# Patient Record
Sex: MALE | Race: BLACK OR AFRICAN AMERICAN | NOT HISPANIC OR LATINO | Employment: FULL TIME | URBAN - METROPOLITAN AREA
[De-identification: names, ages, dates, MRNs, and addresses within clinical notes are randomized per-mention and may not be internally consistent; named-entity substitution may affect disease eponyms.]

---

## 2023-01-06 ENCOUNTER — TELEPHONE (OUTPATIENT)
Dept: NEUROSURGERY | Facility: CLINIC | Age: 48
End: 2023-01-06
Payer: COMMERCIAL

## 2023-01-06 ENCOUNTER — OFFICE VISIT (OUTPATIENT)
Dept: NEUROSURGERY | Facility: CLINIC | Age: 48
End: 2023-01-06
Payer: COMMERCIAL

## 2023-01-06 VITALS — HEART RATE: 81 BPM | SYSTOLIC BLOOD PRESSURE: 120 MMHG | DIASTOLIC BLOOD PRESSURE: 77 MMHG

## 2023-01-06 DIAGNOSIS — D49.6 BRAIN TUMOR: Primary | ICD-10-CM

## 2023-01-06 DIAGNOSIS — R56.9 SEIZURE: ICD-10-CM

## 2023-01-06 PROCEDURE — 99204 OFFICE O/P NEW MOD 45 MIN: CPT | Mod: S$GLB,,, | Performed by: NEUROLOGICAL SURGERY

## 2023-01-06 PROCEDURE — 99204 PR OFFICE/OUTPT VISIT, NEW, LEVL IV, 45-59 MIN: ICD-10-PCS | Mod: S$GLB,,, | Performed by: NEUROLOGICAL SURGERY

## 2023-01-06 PROCEDURE — 99999 PR PBB SHADOW E&M-EST. PATIENT-LVL III: CPT | Mod: PBBFAC,,, | Performed by: NEUROLOGICAL SURGERY

## 2023-01-06 PROCEDURE — 3074F PR MOST RECENT SYSTOLIC BLOOD PRESSURE < 130 MM HG: ICD-10-PCS | Mod: CPTII,S$GLB,, | Performed by: NEUROLOGICAL SURGERY

## 2023-01-06 PROCEDURE — 1159F PR MEDICATION LIST DOCUMENTED IN MEDICAL RECORD: ICD-10-PCS | Mod: CPTII,S$GLB,, | Performed by: NEUROLOGICAL SURGERY

## 2023-01-06 PROCEDURE — 3078F PR MOST RECENT DIASTOLIC BLOOD PRESSURE < 80 MM HG: ICD-10-PCS | Mod: CPTII,S$GLB,, | Performed by: NEUROLOGICAL SURGERY

## 2023-01-06 PROCEDURE — 3078F DIAST BP <80 MM HG: CPT | Mod: CPTII,S$GLB,, | Performed by: NEUROLOGICAL SURGERY

## 2023-01-06 PROCEDURE — 3074F SYST BP LT 130 MM HG: CPT | Mod: CPTII,S$GLB,, | Performed by: NEUROLOGICAL SURGERY

## 2023-01-06 PROCEDURE — 1159F MED LIST DOCD IN RCRD: CPT | Mod: CPTII,S$GLB,, | Performed by: NEUROLOGICAL SURGERY

## 2023-01-06 PROCEDURE — 99999 PR PBB SHADOW E&M-EST. PATIENT-LVL III: ICD-10-PCS | Mod: PBBFAC,,, | Performed by: NEUROLOGICAL SURGERY

## 2023-01-06 RX ORDER — ZOLPIDEM TARTRATE 10 MG/1
10 TABLET ORAL NIGHTLY PRN
COMMUNITY
Start: 2023-01-05

## 2023-01-06 RX ORDER — OMEPRAZOLE 40 MG/1
40 CAPSULE, DELAYED RELEASE ORAL EVERY MORNING
COMMUNITY
Start: 2022-12-29

## 2023-01-06 RX ORDER — CLONAZEPAM 0.5 MG/1
0.5 TABLET ORAL DAILY PRN
COMMUNITY
Start: 2023-01-05

## 2023-01-06 NOTE — H&P (VIEW-ONLY)
Neurosurgery  History & Physical    SUBJECTIVE:     Chief Complaint:  Newly discovered brain tumor    History of Present Illness:  This patient is a very pleasant 47-year-old gentleman who presented to another hospital after having a seizure.  According to the patient and his wife, the patient had a seizure in his sleep.  He then had another seizure while out at lunch.  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  During that visit, he was seen by a local neurosurgeon.  That surgeon agreed with the diagnosis and recommended a higher level of care.    He presents today for that care.    Since his visit to the hospital, he as not had further seizures.  He reports no speech difficulty or weakness at this time.     Review of patient's allergies indicates:  Not on File    No current outpatient medications on file.     No current facility-administered medications for this visit.       No past medical history on file.  No past surgical history on file.  Family History    None       Social History     Socioeconomic History    Marital status:        Review of Systems   Constitutional:  Negative for activity change and appetite change.   HENT: Negative.     Eyes: Negative.    Respiratory: Negative.     Neurological:  Positive for seizures. Negative for dizziness, tremors, syncope, facial asymmetry, speech difficulty, weakness, light-headedness, numbness and headaches.     OBJECTIVE:     Vital Signs     There is no height or weight on file to calculate BMI.      Physical Exam:    Constitutional: He appears well-developed and well-nourished.     Eyes: Pupils are equal, round, and reactive to light. EOM are normal.     Psych/Behavior: He is alert. He is oriented to person, place, and time. He has a normal mood and affect.     Musculoskeletal: Gait is normal.        Right Upper Extremities: Muscle strength is 5/5.        Left Upper  Extremities: Muscle strength is 5/5.       Right Lower Extremities: Muscle strength is 5/5.        Left Lower Extremities: Muscle strength is 5/5.     Neurological:        Cranial nerves: Cranial nerve(s) II, III, IV, V, VI, VII, VIII, IX, X, XI and XII are intact.       Diagnostic Results:  MRI Brain showed 3.8 x 4.0 cm area of increased FLAIR signal with slight enhancement concerning for primary glioma.    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  Will get DTI MRI with functional MRI.  Will plan for surgical resection with Omniscient and motor mapping.         Note dictated with voice recognition software, please excuse any grammatical errors.

## 2023-01-06 NOTE — PROGRESS NOTES
Neurosurgery  History & Physical    SUBJECTIVE:     Chief Complaint:  Newly discovered brain tumor    History of Present Illness:  This patient is a very pleasant 47-year-old gentleman who presented to another hospital after having a seizure.  According to the patient and his wife, the patient had a seizure in his sleep.  He then had another seizure while out at lunch.  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  During that visit, he was seen by a local neurosurgeon.  That surgeon agreed with the diagnosis and recommended a higher level of care.    He presents today for that care.    Since his visit to the hospital, he as not had further seizures.  He reports no speech difficulty or weakness at this time.     Review of patient's allergies indicates:  Not on File    No current outpatient medications on file.     No current facility-administered medications for this visit.       No past medical history on file.  No past surgical history on file.  Family History    None       Social History     Socioeconomic History    Marital status:        Review of Systems   Constitutional:  Negative for activity change and appetite change.   HENT: Negative.     Eyes: Negative.    Respiratory: Negative.     Neurological:  Positive for seizures. Negative for dizziness, tremors, syncope, facial asymmetry, speech difficulty, weakness, light-headedness, numbness and headaches.     OBJECTIVE:     Vital Signs     There is no height or weight on file to calculate BMI.      Physical Exam:    Constitutional: He appears well-developed and well-nourished.     Eyes: Pupils are equal, round, and reactive to light. EOM are normal.     Psych/Behavior: He is alert. He is oriented to person, place, and time. He has a normal mood and affect.     Musculoskeletal: Gait is normal.        Right Upper Extremities: Muscle strength is 5/5.        Left Upper  Extremities: Muscle strength is 5/5.       Right Lower Extremities: Muscle strength is 5/5.        Left Lower Extremities: Muscle strength is 5/5.     Neurological:        Cranial nerves: Cranial nerve(s) II, III, IV, V, VI, VII, VIII, IX, X, XI and XII are intact.       Diagnostic Results:  MRI Brain showed 3.8 x 4.0 cm area of increased FLAIR signal with slight enhancement concerning for primary glioma.    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  Will get DTI MRI with functional MRI.  Will plan for surgical resection with Omniscient and motor mapping.         Note dictated with voice recognition software, please excuse any grammatical errors.

## 2023-01-11 ENCOUNTER — HOSPITAL ENCOUNTER (OUTPATIENT)
Dept: RADIOLOGY | Facility: HOSPITAL | Age: 48
Discharge: HOME | DRG: 027 | End: 2023-01-11
Attending: NEUROLOGICAL SURGERY
Payer: COMMERCIAL

## 2023-01-11 ENCOUNTER — ANESTHESIA EVENT (OUTPATIENT)
Dept: SURGERY | Facility: HOSPITAL | Age: 48
DRG: 027 | End: 2023-01-11
Payer: COMMERCIAL

## 2023-01-11 ENCOUNTER — TELEPHONE (OUTPATIENT)
Dept: NEUROSURGERY | Facility: CLINIC | Age: 48
End: 2023-01-11
Payer: COMMERCIAL

## 2023-01-11 DIAGNOSIS — D49.6 BRAIN TUMOR: ICD-10-CM

## 2023-01-11 PROCEDURE — 70553 MRI BRAIN STEM W/O & W/DYE: CPT | Mod: TC

## 2023-01-11 PROCEDURE — A9585 GADOBUTROL INJECTION: HCPCS | Performed by: NEUROLOGICAL SURGERY

## 2023-01-11 PROCEDURE — 25500020 PHARM REV CODE 255: Performed by: NEUROLOGICAL SURGERY

## 2023-01-11 PROCEDURE — 70553 MRI BRAIN STEM W/O & W/DYE: CPT | Mod: 26,,, | Performed by: RADIOLOGY

## 2023-01-11 PROCEDURE — 70555 MRI BRAIN FUNCTIONAL WITH A PHYSICIAN: ICD-10-PCS | Mod: 26,59,, | Performed by: RADIOLOGY

## 2023-01-11 PROCEDURE — 70555 FMRI BRAIN BY PHYS/PSYCH: CPT | Mod: TC

## 2023-01-11 PROCEDURE — 70555 FMRI BRAIN BY PHYS/PSYCH: CPT | Mod: 26,59,, | Performed by: RADIOLOGY

## 2023-01-11 PROCEDURE — 70553 MRI BRAIN SYNAPTIVE STEALTH W/WO CONTRAST: ICD-10-PCS | Mod: 26,,, | Performed by: RADIOLOGY

## 2023-01-11 RX ORDER — GADOBUTROL 604.72 MG/ML
8 INJECTION INTRAVENOUS
Status: COMPLETED | OUTPATIENT
Start: 2023-01-11 | End: 2023-01-11

## 2023-01-11 RX ADMIN — GADOBUTROL 8 ML: 604.72 INJECTION INTRAVENOUS at 12:01

## 2023-01-11 NOTE — PRE-PROCEDURE INSTRUCTIONS
PREOP INSTRUCTIONS TO PATIENT'S WIFE:  No food,milk or milk products for 8 hours before surgery.  Clear liquids like water,gatorade,apple juice are allowed up until 2 hours before surgery.  Instructed to follow the surgeon's instructions if they differ from these.  Shower instructions as well as directions to the Surgery Center were given.  Encouraged to wear loose fitting,comfortable clothing.  Medication instructions for pm prior to and am of procedure reviewed.  Instructed to avoid taking vitamins,supplements,aspirin and ibuprofen the morning of surgery.    This will be the patient's first surgery/anesthesia    Patient's wife does not know arrival time.Explained that this information comes from the surgeon's office and if they haven't heard from them by 2 or 3 pm to call the office.Patient's wife stated an understanding.

## 2023-01-11 NOTE — TELEPHONE ENCOUNTER
Patient contacted. S/W wife. Advised to arrive for surgery at 0845, DOSC, NPO after MN, shower x 2 with Hibiclens or Dial antibacterial soap. Understanding verbalized by wife.

## 2023-01-12 ENCOUNTER — ANESTHESIA (OUTPATIENT)
Dept: SURGERY | Facility: HOSPITAL | Age: 48
DRG: 027 | End: 2023-01-12
Payer: COMMERCIAL

## 2023-01-12 ENCOUNTER — HOSPITAL ENCOUNTER (INPATIENT)
Facility: HOSPITAL | Age: 48
LOS: 1 days | Discharge: HOME | DRG: 027 | End: 2023-01-13
Attending: NEUROLOGICAL SURGERY | Admitting: NEUROLOGICAL SURGERY
Payer: COMMERCIAL

## 2023-01-12 DIAGNOSIS — Z91.89 AT RISK FOR LONG QT SYNDROME: ICD-10-CM

## 2023-01-12 DIAGNOSIS — D49.6 BRAIN TUMOR: ICD-10-CM

## 2023-01-12 PROBLEM — R56.9 SEIZURES: Chronic | Status: ACTIVE | Noted: 2023-01-12

## 2023-01-12 LAB
ABO + RH BLD: NORMAL
BLD GP AB SCN CELLS X3 SERPL QL: NORMAL
POCT GLUCOSE: 122 MG/DL (ref 70–110)
POCT GLUCOSE: 143 MG/DL (ref 70–110)

## 2023-01-12 PROCEDURE — 61510 CRNEC TREPH EXC BRN TUM STTL: CPT | Mod: ,,, | Performed by: NEUROLOGICAL SURGERY

## 2023-01-12 PROCEDURE — 37000009 HC ANESTHESIA EA ADD 15 MINS: Performed by: NEUROLOGICAL SURGERY

## 2023-01-12 PROCEDURE — 36000713 HC OR TIME LEV V EA ADD 15 MIN: Performed by: NEUROLOGICAL SURGERY

## 2023-01-12 PROCEDURE — 37000008 HC ANESTHESIA 1ST 15 MINUTES: Performed by: NEUROLOGICAL SURGERY

## 2023-01-12 PROCEDURE — 64405 PR NERVE BLOCK INJ, ANES/STEROID, OCCIPITAL: ICD-10-PCS | Mod: 59,50,, | Performed by: ANESTHESIOLOGY

## 2023-01-12 PROCEDURE — 64400 PERIPHERAL BLOCK: ICD-10-PCS | Mod: 59,50,, | Performed by: ANESTHESIOLOGY

## 2023-01-12 PROCEDURE — 25000003 PHARM REV CODE 250: Performed by: NURSE ANESTHETIST, CERTIFIED REGISTERED

## 2023-01-12 PROCEDURE — 36000712 HC OR TIME LEV V 1ST 15 MIN: Performed by: NEUROLOGICAL SURGERY

## 2023-01-12 PROCEDURE — 61510 PR EXCIS SUPRATENT BRAIN TUMOR: ICD-10-PCS | Mod: ,,, | Performed by: NEUROLOGICAL SURGERY

## 2023-01-12 PROCEDURE — 64450 PR NERVE BLOCK INJ, ANES/STEROID, OTHER PERIPHERAL: ICD-10-PCS | Mod: 59,50,, | Performed by: ANESTHESIOLOGY

## 2023-01-12 PROCEDURE — 93010 ELECTROCARDIOGRAM REPORT: CPT | Mod: ,,, | Performed by: INTERNAL MEDICINE

## 2023-01-12 PROCEDURE — D9220A PRA ANESTHESIA: Mod: CRNA,,, | Performed by: NURSE ANESTHETIST, CERTIFIED REGISTERED

## 2023-01-12 PROCEDURE — 63600175 PHARM REV CODE 636 W HCPCS: Performed by: NURSE ANESTHETIST, CERTIFIED REGISTERED

## 2023-01-12 PROCEDURE — D9220A PRA ANESTHESIA: Mod: ANES,,, | Performed by: ANESTHESIOLOGY

## 2023-01-12 PROCEDURE — 64450 NJX AA&/STRD OTHER PN/BRANCH: CPT | Mod: 59,50,, | Performed by: ANESTHESIOLOGY

## 2023-01-12 PROCEDURE — 99223 1ST HOSP IP/OBS HIGH 75: CPT | Mod: ,,,

## 2023-01-12 PROCEDURE — 86920 COMPATIBILITY TEST SPIN: CPT | Performed by: NEUROLOGICAL SURGERY

## 2023-01-12 PROCEDURE — 61510 PR EXCIS SUPRATENT BRAIN TUMOR: ICD-10-PCS | Mod: AS,,, | Performed by: PHYSICIAN ASSISTANT

## 2023-01-12 PROCEDURE — 27201423 OPTIME MED/SURG SUP & DEVICES STERILE SUPPLY: Performed by: NEUROLOGICAL SURGERY

## 2023-01-12 PROCEDURE — 25500020 PHARM REV CODE 255: Performed by: NEUROLOGICAL SURGERY

## 2023-01-12 PROCEDURE — 63600175 PHARM REV CODE 636 W HCPCS: Performed by: ANESTHESIOLOGY

## 2023-01-12 PROCEDURE — 61510 CRNEC TREPH EXC BRN TUM STTL: CPT | Mod: AS,,, | Performed by: PHYSICIAN ASSISTANT

## 2023-01-12 PROCEDURE — D9220A PRA ANESTHESIA: ICD-10-PCS | Mod: CRNA,,, | Performed by: NURSE ANESTHETIST, CERTIFIED REGISTERED

## 2023-01-12 PROCEDURE — 64400 NJX AA&/STRD TRIGEMINAL NRV: CPT | Mod: 59,50,, | Performed by: ANESTHESIOLOGY

## 2023-01-12 PROCEDURE — 63600175 PHARM REV CODE 636 W HCPCS

## 2023-01-12 PROCEDURE — A9585 GADOBUTROL INJECTION: HCPCS | Performed by: NEUROLOGICAL SURGERY

## 2023-01-12 PROCEDURE — D9220A PRA ANESTHESIA: ICD-10-PCS | Mod: ANES,,, | Performed by: ANESTHESIOLOGY

## 2023-01-12 PROCEDURE — 61781 PR STEREOTACTIC COMP ASSIST PROC,CRANIAL,INTRADURAL: ICD-10-PCS | Mod: ,,, | Performed by: NEUROLOGICAL SURGERY

## 2023-01-12 PROCEDURE — 61781 SCAN PROC CRANIAL INTRA: CPT | Mod: ,,, | Performed by: NEUROLOGICAL SURGERY

## 2023-01-12 PROCEDURE — 81455 SO/HL 51/>GSAP DNA/DNA&RNA: CPT | Performed by: STUDENT IN AN ORGANIZED HEALTH CARE EDUCATION/TRAINING PROGRAM

## 2023-01-12 PROCEDURE — 36415 COLL VENOUS BLD VENIPUNCTURE: CPT | Performed by: NEUROLOGICAL SURGERY

## 2023-01-12 PROCEDURE — 25000003 PHARM REV CODE 250

## 2023-01-12 PROCEDURE — 20000000 HC ICU ROOM

## 2023-01-12 PROCEDURE — 86900 BLOOD TYPING SEROLOGIC ABO: CPT | Performed by: NEUROLOGICAL SURGERY

## 2023-01-12 PROCEDURE — 99223 PR INITIAL HOSPITAL CARE,LEVL III: ICD-10-PCS | Mod: ,,,

## 2023-01-12 PROCEDURE — 36620 INSERTION CATHETER ARTERY: CPT | Mod: 59,,, | Performed by: ANESTHESIOLOGY

## 2023-01-12 PROCEDURE — 64405 NJX AA&/STRD GR OCPL NRV: CPT | Mod: 59,50,, | Performed by: ANESTHESIOLOGY

## 2023-01-12 PROCEDURE — 63600175 PHARM REV CODE 636 W HCPCS: Performed by: NEUROLOGICAL SURGERY

## 2023-01-12 PROCEDURE — 93010 EKG 12-LEAD: ICD-10-PCS | Mod: ,,, | Performed by: INTERNAL MEDICINE

## 2023-01-12 PROCEDURE — 36620 ARTERIAL: ICD-10-PCS | Mod: 59,,, | Performed by: ANESTHESIOLOGY

## 2023-01-12 PROCEDURE — C1713 ANCHOR/SCREW BN/BN,TIS/BN: HCPCS | Performed by: NEUROLOGICAL SURGERY

## 2023-01-12 PROCEDURE — 93005 ELECTROCARDIOGRAM TRACING: CPT

## 2023-01-12 PROCEDURE — 25000003 PHARM REV CODE 250: Performed by: NEUROLOGICAL SURGERY

## 2023-01-12 PROCEDURE — 63600175 PHARM REV CODE 636 W HCPCS: Performed by: PHYSICIAN ASSISTANT

## 2023-01-12 DEVICE — PLATE BONE 2X2 HOLE SM BOX: Type: IMPLANTABLE DEVICE | Site: CRANIAL | Status: FUNCTIONAL

## 2023-01-12 DEVICE — PLATE BONE BUR HOLE COVER 10MM: Type: IMPLANTABLE DEVICE | Site: CRANIAL | Status: FUNCTIONAL

## 2023-01-12 DEVICE — SCREW UN3 AXS SD 1.5X4MM: Type: IMPLANTABLE DEVICE | Site: CRANIAL | Status: FUNCTIONAL

## 2023-01-12 RX ORDER — DEXAMETHASONE SODIUM PHOSPHATE 4 MG/ML
4 INJECTION, SOLUTION INTRA-ARTICULAR; INTRALESIONAL; INTRAMUSCULAR; INTRAVENOUS; SOFT TISSUE EVERY 6 HOURS
Status: CANCELLED | OUTPATIENT
Start: 2023-01-12

## 2023-01-12 RX ORDER — PROPOFOL 10 MG/ML
VIAL (ML) INTRAVENOUS CONTINUOUS PRN
Status: DISCONTINUED | OUTPATIENT
Start: 2023-01-12 | End: 2023-01-12

## 2023-01-12 RX ORDER — BUPIVACAINE HYDROCHLORIDE AND EPINEPHRINE 5; 5 MG/ML; UG/ML
INJECTION, SOLUTION EPIDURAL; INTRACAUDAL; PERINEURAL
Status: DISCONTINUED | OUTPATIENT
Start: 2023-01-12 | End: 2023-01-12 | Stop reason: HOSPADM

## 2023-01-12 RX ORDER — ROPIVACAINE HYDROCHLORIDE 5 MG/ML
INJECTION, SOLUTION EPIDURAL; INFILTRATION; PERINEURAL
Status: COMPLETED
Start: 2023-01-12 | End: 2023-01-12

## 2023-01-12 RX ORDER — LIDOCAINE HYDROCHLORIDE AND EPINEPHRINE 10; 10 MG/ML; UG/ML
INJECTION, SOLUTION INFILTRATION; PERINEURAL
Status: DISCONTINUED | OUTPATIENT
Start: 2023-01-12 | End: 2023-01-12 | Stop reason: HOSPADM

## 2023-01-12 RX ORDER — TALC
6 POWDER (GRAM) TOPICAL NIGHTLY PRN
Status: DISCONTINUED | OUTPATIENT
Start: 2023-01-12 | End: 2023-01-13 | Stop reason: HOSPADM

## 2023-01-12 RX ORDER — HEPARIN SODIUM 5000 [USP'U]/ML
5000 INJECTION, SOLUTION INTRAVENOUS; SUBCUTANEOUS EVERY 8 HOURS
Status: DISCONTINUED | OUTPATIENT
Start: 2023-01-13 | End: 2023-01-13 | Stop reason: HOSPADM

## 2023-01-12 RX ORDER — HYDRALAZINE HYDROCHLORIDE 20 MG/ML
10 INJECTION INTRAMUSCULAR; INTRAVENOUS EVERY 6 HOURS PRN
Status: DISCONTINUED | OUTPATIENT
Start: 2023-01-12 | End: 2023-01-13 | Stop reason: HOSPADM

## 2023-01-12 RX ORDER — GABAPENTIN 300 MG/1
300 CAPSULE ORAL 3 TIMES DAILY
Status: DISCONTINUED | OUTPATIENT
Start: 2023-01-12 | End: 2023-01-13 | Stop reason: HOSPADM

## 2023-01-12 RX ORDER — CLONAZEPAM 0.5 MG/1
0.5 TABLET ORAL 2 TIMES DAILY PRN
Status: DISCONTINUED | OUTPATIENT
Start: 2023-01-12 | End: 2023-01-13 | Stop reason: HOSPADM

## 2023-01-12 RX ORDER — ACETAMINOPHEN 10 MG/ML
INJECTION, SOLUTION INTRAVENOUS
Status: DISCONTINUED | OUTPATIENT
Start: 2023-01-12 | End: 2023-01-12

## 2023-01-12 RX ORDER — ROPIVACAINE HYDROCHLORIDE 5 MG/ML
INJECTION, SOLUTION EPIDURAL; INFILTRATION; PERINEURAL
Status: COMPLETED | OUTPATIENT
Start: 2023-01-12 | End: 2023-01-12

## 2023-01-12 RX ORDER — DEXAMETHASONE SODIUM PHOSPHATE 4 MG/ML
4 INJECTION, SOLUTION INTRA-ARTICULAR; INTRALESIONAL; INTRAMUSCULAR; INTRAVENOUS; SOFT TISSUE EVERY 6 HOURS
Status: DISCONTINUED | OUTPATIENT
Start: 2023-01-12 | End: 2023-01-13 | Stop reason: HOSPADM

## 2023-01-12 RX ORDER — MIDAZOLAM HYDROCHLORIDE 1 MG/ML
INJECTION INTRAMUSCULAR; INTRAVENOUS
Status: DISCONTINUED | OUTPATIENT
Start: 2023-01-12 | End: 2023-01-12

## 2023-01-12 RX ORDER — BACITRACIN ZINC 500 UNIT/G
OINTMENT (GRAM) TOPICAL
Status: DISCONTINUED | OUTPATIENT
Start: 2023-01-12 | End: 2023-01-12 | Stop reason: HOSPADM

## 2023-01-12 RX ORDER — LABETALOL HCL 20 MG/4 ML
10 SYRINGE (ML) INTRAVENOUS EVERY 4 HOURS PRN
Status: DISCONTINUED | OUTPATIENT
Start: 2023-01-12 | End: 2023-01-13 | Stop reason: HOSPADM

## 2023-01-12 RX ORDER — LEVETIRACETAM 500 MG/1
500 TABLET ORAL 2 TIMES DAILY
Status: DISCONTINUED | OUTPATIENT
Start: 2023-01-12 | End: 2023-01-13 | Stop reason: HOSPADM

## 2023-01-12 RX ORDER — PROCHLORPERAZINE EDISYLATE 5 MG/ML
5 INJECTION INTRAMUSCULAR; INTRAVENOUS EVERY 6 HOURS PRN
Status: CANCELLED | OUTPATIENT
Start: 2023-01-12

## 2023-01-12 RX ORDER — MUPIROCIN 20 MG/G
OINTMENT TOPICAL
Status: DISCONTINUED | OUTPATIENT
Start: 2023-01-12 | End: 2023-01-12 | Stop reason: HOSPADM

## 2023-01-12 RX ORDER — TRAZODONE HYDROCHLORIDE 50 MG/1
50 TABLET ORAL NIGHTLY
COMMUNITY
Start: 2022-12-28 | End: 2023-05-11

## 2023-01-12 RX ORDER — ONDANSETRON 2 MG/ML
4 INJECTION INTRAMUSCULAR; INTRAVENOUS EVERY 6 HOURS PRN
Status: DISCONTINUED | OUTPATIENT
Start: 2023-01-12 | End: 2023-01-13 | Stop reason: HOSPADM

## 2023-01-12 RX ORDER — INSULIN ASPART 100 [IU]/ML
0-5 INJECTION, SOLUTION INTRAVENOUS; SUBCUTANEOUS
Status: DISCONTINUED | OUTPATIENT
Start: 2023-01-12 | End: 2023-01-13 | Stop reason: HOSPADM

## 2023-01-12 RX ORDER — GLUCAGON 1 MG
1 KIT INJECTION
Status: DISCONTINUED | OUTPATIENT
Start: 2023-01-12 | End: 2023-01-13 | Stop reason: HOSPADM

## 2023-01-12 RX ORDER — ZOLPIDEM TARTRATE 10 MG/1
10 TABLET ORAL NIGHTLY PRN
Status: DISCONTINUED | OUTPATIENT
Start: 2023-01-12 | End: 2023-01-12

## 2023-01-12 RX ORDER — ONDANSETRON 2 MG/ML
4 INJECTION INTRAMUSCULAR; INTRAVENOUS EVERY 12 HOURS PRN
Status: CANCELLED | OUTPATIENT
Start: 2023-01-12

## 2023-01-12 RX ORDER — SUCCINYLCHOLINE CHLORIDE 20 MG/ML
INJECTION INTRAMUSCULAR; INTRAVENOUS
Status: DISCONTINUED | OUTPATIENT
Start: 2023-01-12 | End: 2023-01-12

## 2023-01-12 RX ORDER — ACETAMINOPHEN 500 MG
1000 TABLET ORAL EVERY 6 HOURS PRN
COMMUNITY

## 2023-01-12 RX ORDER — LIDOCAINE HCL/PF 100 MG/5ML
SYRINGE (ML) INTRAVENOUS
Status: DISCONTINUED | OUTPATIENT
Start: 2023-01-12 | End: 2023-01-12

## 2023-01-12 RX ORDER — LEVETIRACETAM 500 MG/1
500 TABLET ORAL EVERY 12 HOURS
Status: CANCELLED | OUTPATIENT
Start: 2023-01-12

## 2023-01-12 RX ORDER — ACETAMINOPHEN 500 MG
1000 TABLET ORAL EVERY 8 HOURS
Status: CANCELLED | OUTPATIENT
Start: 2023-01-12

## 2023-01-12 RX ORDER — SODIUM CHLORIDE 9 MG/ML
INJECTION, SOLUTION INTRAVENOUS CONTINUOUS PRN
Status: DISCONTINUED | OUTPATIENT
Start: 2023-01-12 | End: 2023-01-12

## 2023-01-12 RX ORDER — IBUPROFEN 200 MG
16 TABLET ORAL
Status: DISCONTINUED | OUTPATIENT
Start: 2023-01-12 | End: 2023-01-13 | Stop reason: HOSPADM

## 2023-01-12 RX ORDER — SODIUM CHLORIDE 9 MG/ML
INJECTION, SOLUTION INTRAVENOUS CONTINUOUS
Status: DISCONTINUED | OUTPATIENT
Start: 2023-01-12 | End: 2023-01-12

## 2023-01-12 RX ORDER — LEVETIRACETAM 500 MG/5ML
INJECTION, SOLUTION, CONCENTRATE INTRAVENOUS
Status: DISCONTINUED | OUTPATIENT
Start: 2023-01-12 | End: 2023-01-12

## 2023-01-12 RX ORDER — OXYCODONE HYDROCHLORIDE 10 MG/1
10 TABLET ORAL EVERY 4 HOURS PRN
Status: CANCELLED | OUTPATIENT
Start: 2023-01-12

## 2023-01-12 RX ORDER — FAMOTIDINE 20 MG/1
20 TABLET, FILM COATED ORAL 2 TIMES DAILY
Status: DISCONTINUED | OUTPATIENT
Start: 2023-01-12 | End: 2023-01-13 | Stop reason: HOSPADM

## 2023-01-12 RX ORDER — LEVETIRACETAM 500 MG/1
500 TABLET ORAL 2 TIMES DAILY
COMMUNITY
Start: 2023-01-04 | End: 2024-02-20 | Stop reason: SDUPTHER

## 2023-01-12 RX ORDER — ROCURONIUM BROMIDE 10 MG/ML
INJECTION, SOLUTION INTRAVENOUS
Status: DISCONTINUED | OUTPATIENT
Start: 2023-01-12 | End: 2023-01-12

## 2023-01-12 RX ORDER — MUPIROCIN 20 MG/G
1 OINTMENT TOPICAL 2 TIMES DAILY
Status: DISCONTINUED | OUTPATIENT
Start: 2023-01-12 | End: 2023-01-12 | Stop reason: HOSPADM

## 2023-01-12 RX ORDER — GADOBUTROL 604.72 MG/ML
8 INJECTION INTRAVENOUS
Status: COMPLETED | OUTPATIENT
Start: 2023-01-12 | End: 2023-01-12

## 2023-01-12 RX ORDER — OXYCODONE HYDROCHLORIDE 5 MG/1
5 TABLET ORAL EVERY 4 HOURS PRN
Status: CANCELLED | OUTPATIENT
Start: 2023-01-12

## 2023-01-12 RX ORDER — DEXAMETHASONE SODIUM PHOSPHATE 4 MG/ML
INJECTION, SOLUTION INTRA-ARTICULAR; INTRALESIONAL; INTRAMUSCULAR; INTRAVENOUS; SOFT TISSUE
Status: DISCONTINUED | OUTPATIENT
Start: 2023-01-12 | End: 2023-01-12

## 2023-01-12 RX ORDER — OXYCODONE HYDROCHLORIDE 5 MG/1
5 TABLET ORAL EVERY 6 HOURS PRN
Status: DISCONTINUED | OUTPATIENT
Start: 2023-01-12 | End: 2023-01-13 | Stop reason: HOSPADM

## 2023-01-12 RX ORDER — FENTANYL CITRATE 50 UG/ML
25 INJECTION, SOLUTION INTRAMUSCULAR; INTRAVENOUS EVERY 4 HOURS PRN
Status: DISCONTINUED | OUTPATIENT
Start: 2023-01-12 | End: 2023-01-13 | Stop reason: HOSPADM

## 2023-01-12 RX ORDER — PHENYLEPHRINE HYDROCHLORIDE 10 MG/ML
INJECTION INTRAVENOUS
Status: DISCONTINUED | OUTPATIENT
Start: 2023-01-12 | End: 2023-01-12

## 2023-01-12 RX ORDER — IBUPROFEN 200 MG
24 TABLET ORAL
Status: DISCONTINUED | OUTPATIENT
Start: 2023-01-12 | End: 2023-01-13 | Stop reason: HOSPADM

## 2023-01-12 RX ORDER — FENTANYL CITRATE 50 UG/ML
INJECTION, SOLUTION INTRAMUSCULAR; INTRAVENOUS
Status: DISCONTINUED | OUTPATIENT
Start: 2023-01-12 | End: 2023-01-12

## 2023-01-12 RX ORDER — FAMOTIDINE 20 MG/1
20 TABLET, FILM COATED ORAL 2 TIMES DAILY
Status: CANCELLED | OUTPATIENT
Start: 2023-01-12

## 2023-01-12 RX ORDER — MUPIROCIN 20 MG/G
OINTMENT TOPICAL 2 TIMES DAILY
Status: CANCELLED | OUTPATIENT
Start: 2023-01-12 | End: 2023-01-17

## 2023-01-12 RX ORDER — ACETAMINOPHEN 325 MG/1
650 TABLET ORAL EVERY 6 HOURS PRN
Status: DISCONTINUED | OUTPATIENT
Start: 2023-01-12 | End: 2023-01-13 | Stop reason: HOSPADM

## 2023-01-12 RX ORDER — PROPOFOL 10 MG/ML
VIAL (ML) INTRAVENOUS
Status: DISCONTINUED | OUTPATIENT
Start: 2023-01-12 | End: 2023-01-12

## 2023-01-12 RX ADMIN — REMIFENTANIL HYDROCHLORIDE 0.2 MCG/KG/MIN: 1 INJECTION, POWDER, LYOPHILIZED, FOR SOLUTION INTRAVENOUS at 11:01

## 2023-01-12 RX ADMIN — LIDOCAINE HYDROCHLORIDE 100 MG: 20 INJECTION, SOLUTION INTRAVENOUS at 11:01

## 2023-01-12 RX ADMIN — CEFTRIAXONE SODIUM 2 G: 1 INJECTION, SOLUTION INTRAVENOUS at 12:01

## 2023-01-12 RX ADMIN — GABAPENTIN 300 MG: 300 CAPSULE ORAL at 08:01

## 2023-01-12 RX ADMIN — FENTANYL CITRATE 25 MCG: 50 INJECTION, SOLUTION INTRAMUSCULAR; INTRAVENOUS at 09:01

## 2023-01-12 RX ADMIN — FENTANYL CITRATE 25 MCG: 50 INJECTION, SOLUTION INTRAMUSCULAR; INTRAVENOUS at 02:01

## 2023-01-12 RX ADMIN — PROPOFOL 125 MCG/KG/MIN: 10 INJECTION, EMULSION INTRAVENOUS at 11:01

## 2023-01-12 RX ADMIN — DEXAMETHASONE SODIUM PHOSPHATE 4 MG: 4 INJECTION INTRA-ARTICULAR; INTRALESIONAL; INTRAMUSCULAR; INTRAVENOUS; SOFT TISSUE at 05:01

## 2023-01-12 RX ADMIN — PROPOFOL 200 MG: 10 INJECTION, EMULSION INTRAVENOUS at 11:01

## 2023-01-12 RX ADMIN — FAMOTIDINE 20 MG: 20 TABLET ORAL at 08:01

## 2023-01-12 RX ADMIN — SODIUM CHLORIDE, SODIUM GLUCONATE, SODIUM ACETATE, POTASSIUM CHLORIDE, MAGNESIUM CHLORIDE, SODIUM PHOSPHATE, DIBASIC, AND POTASSIUM PHOSPHATE: .53; .5; .37; .037; .03; .012; .00082 INJECTION, SOLUTION INTRAVENOUS at 11:01

## 2023-01-12 RX ADMIN — LEVETIRACETAM 1000 MG: 100 INJECTION, SOLUTION INTRAVENOUS at 12:01

## 2023-01-12 RX ADMIN — SODIUM CHLORIDE: 0.9 INJECTION, SOLUTION INTRAVENOUS at 11:01

## 2023-01-12 RX ADMIN — OXYCODONE HYDROCHLORIDE 5 MG: 5 TABLET ORAL at 05:01

## 2023-01-12 RX ADMIN — DEXAMETHASONE SODIUM PHOSPHATE 12 MG: 4 INJECTION, SOLUTION INTRAMUSCULAR; INTRAVENOUS at 12:01

## 2023-01-12 RX ADMIN — SODIUM CHLORIDE, SODIUM GLUCONATE, SODIUM ACETATE, POTASSIUM CHLORIDE, MAGNESIUM CHLORIDE, SODIUM PHOSPHATE, DIBASIC, AND POTASSIUM PHOSPHATE: .53; .5; .37; .037; .03; .012; .00082 INJECTION, SOLUTION INTRAVENOUS at 12:01

## 2023-01-12 RX ADMIN — LEVETIRACETAM 500 MG: 500 TABLET, FILM COATED ORAL at 08:01

## 2023-01-12 RX ADMIN — ROCURONIUM BROMIDE 10 MG: 10 INJECTION INTRAVENOUS at 11:01

## 2023-01-12 RX ADMIN — SODIUM CHLORIDE 0.5 MCG/KG/MIN: 9 INJECTION, SOLUTION INTRAVENOUS at 12:01

## 2023-01-12 RX ADMIN — ROPIVACAINE HYDROCHLORIDE 40 ML: 5 INJECTION EPIDURAL; INFILTRATION; PERINEURAL at 11:01

## 2023-01-12 RX ADMIN — SUCCINYLCHOLINE CHLORIDE 160 MG: 20 INJECTION, SOLUTION INTRAMUSCULAR; INTRAVENOUS at 11:01

## 2023-01-12 RX ADMIN — ACETAMINOPHEN 1000 MG: 10 INJECTION INTRAVENOUS at 12:01

## 2023-01-12 RX ADMIN — GADOBUTROL 8 ML: 604.72 INJECTION INTRAVENOUS at 09:01

## 2023-01-12 RX ADMIN — ACETAMINOPHEN 650 MG: 325 TABLET ORAL at 10:01

## 2023-01-12 RX ADMIN — FENTANYL CITRATE 25 MCG: 50 INJECTION, SOLUTION INTRAMUSCULAR; INTRAVENOUS at 11:01

## 2023-01-12 RX ADMIN — MIDAZOLAM HYDROCHLORIDE 2 MG: 1 INJECTION, SOLUTION INTRAMUSCULAR; INTRAVENOUS at 11:01

## 2023-01-12 RX ADMIN — PHENYLEPHRINE HYDROCHLORIDE 100 MCG: 10 INJECTION INTRAVENOUS at 02:01

## 2023-01-12 NOTE — BRIEF OP NOTE
[facility] ([facility])  Brief Operative Note    SUMMARY     Surgery Date: 1/12/2023     Surgeon(s) and Role:     * Wellington Park MD - Primary     * Magali Gonzalez PA-C - Assisting     Assisting Surgeon: None    Pre-op Diagnosis:  Brain tumor [D49.6]    Post-op Diagnosis:  Post-Op Diagnosis Codes:     * Brain tumor [D49.6]    Procedure(s) (LRB):  CRANIOTOMY, USING FRAMELESS STEREOTAXY (Left)    Anesthesia: General    Operative Findings: left frontal craniotomy for tumor resection     Estimated Blood Loss: * No values recorded between 1/12/2023 12:24 PM and 1/12/2023  2:39 PM *    Estimated Blood Loss has been documented.         Specimens:   Specimen (24h ago, onward)       Start     Ordered    01/12/23 1305  Specimen to Pathology, Surgery Neurosurgery  Once        Comments: Pre-op Diagnosis: Brain tumor [D49.6]Procedure(s):CRANIOTOMY, USING FRAMELESS STEREOTAXY Number of specimens: 2Name of specimens: 1. Brain tumor - Frozen (to pathology with TAYLER Ramirez @ 1304 pm)  2. Brain tumor - Permanent     References:    Click here for ordering Quick Tip   Question Answer Comment   Procedure Type: Neurosurgery    Which provider would you like to cc? WELLINGTON PARK    Release to patient Immediate        01/12/23 1346                    [id number]

## 2023-01-12 NOTE — ANESTHESIA POSTPROCEDURE EVALUATION
Anesthesia Post Evaluation    Patient: Marvin Pool    Procedure(s) Performed: Procedure(s) (LRB):  CRANIOTOMY, USING FRAMELESS STEREOTAXY (Left)    Final Anesthesia Type: general      Patient location during evaluation: ICU  Patient participation: Yes- Able to Participate  Level of consciousness: awake and alert  Post-procedure vital signs: reviewed and stable  Pain management: adequate  Airway patency: patent    PONV status at discharge: No PONV  Anesthetic complications: no      Cardiovascular status: blood pressure returned to baseline  Respiratory status: unassisted  Hydration status: euvolemic  Follow-up not needed.          Vitals Value Taken Time   /72 01/12/23 1731   Temp 36.9 °C (98.5 °F) 01/12/23 1445   Pulse 81 01/12/23 1757   Resp 27 01/12/23 1757   SpO2 99 % 01/12/23 1757   Vitals shown include unvalidated device data.      No case tracking events are documented in the log.      Pain/Italia Score: Pain Rating Prior to Med Admin: 4 (1/12/2023  5:52 PM)

## 2023-01-12 NOTE — ANESTHESIA PROCEDURE NOTES
Arterial    Diagnosis: brain tumor  Doctor requesting consult: audrey    Patient location during procedure: done in OR  Procedure start time: 1/12/2023 12:02 PM  Timeout: 1/12/2023 12:01 PM  Procedure end time: 1/12/2023 12:10 PM    Staffing  Authorizing Provider: Da Cope MD  Performing Provider: Da Cope MD    Anesthesiologist was present at the time of the procedure.    Preanesthetic Checklist  Completed: patient identified, IV checked, site marked, risks and benefits discussed, surgical consent, monitors and equipment checked, pre-op evaluation, timeout performed and anesthesia consent givenArterial  Skin Prep: chlorhexidine gluconate  Local Infiltration: none  Orientation: right  Location: radial    Catheter Size: 20 G  Catheter placement by Ultrasound guidance. Heme positive aspiration all ports.   Vessel Caliber: small, patent, compressibility normal  Needle advanced into vessel with real time Ultrasound guidance.Insertion Attempts: 4 or more  Assessment  Dressing: secured with tape and tegaderm  Patient: Tolerated well

## 2023-01-12 NOTE — TRANSFER OF CARE
"Anesthesia Transfer of Care Note    Patient: Marvin Pool    Procedure(s) Performed: Procedure(s) (LRB):  CRANIOTOMY, USING FRAMELESS STEREOTAXY (Left)    Patient location: ICU    Anesthesia Type: general    Transport from OR: Transported from OR on 6-10 L/min O2 by face mask with adequate spontaneous ventilation. Continuous ECG monitoring in transport. Continuous SpO2 monitoring in transport. Continuos invasive BP monitoring in transport    Post pain: adequate analgesia    Post assessment: no apparent anesthetic complications    Post vital signs: stable    Level of consciousness: awake    Nausea/Vomiting: no nausea/vomiting    Complications: none    Transfer of care protocol was followed      Last vitals:   Visit Vitals  BP (!) 98/59   Pulse 86   Temp 36.9 °C (98.5 °F) (Oral)   Resp 12   Ht 5' 10" (1.778 m)   Wt 74.8 kg (165 lb)   SpO2 100%   BMI 23.68 kg/m²     "

## 2023-01-12 NOTE — NURSING
Patient arrived to [facility] from ; OR>> [facility]    Report received from: OR CRNA    Type of stroke/diagnosis:  Brain tumor    Thrombectomy start and end time (if applicable)    Current symptoms: slight headache    Skin assessment done: YES  Wounds noted:None     *If wounds noted, was Wound Care consulted? Y/N    Yudy Completed? YES/Passed    Patient Belongings on Admit: none    NCC notified: STONE Torres

## 2023-01-12 NOTE — ANESTHESIA PREPROCEDURE EVALUATION
01/12/2023  Marvin Pool is a 47 y.o., male.      Pre-op Assessment    I have reviewed the Patient Summary Reports.       I have reviewed the Medications.     Review of Systems  Anesthesia Hx:  No previous Anesthesia Denies Hx of Anesthetic complications  Neg history of prior surgery. Denies Family Hx of Anesthesia complications.   Denies Personal Hx of Anesthesia complications.   Hematology/Oncology:  Hematology Normal   Oncology Normal     EENT/Dental:EENT/Dental Normal   Cardiovascular:   Exercise tolerance: good  Denies Angina.  Functional Capacity good / => 4 METS  Carotoid Artery Disease    Pulmonary:  Pulmonary Normal    Renal/:  Renal/ Normal     Hepatic/GI:   GERD, well controlled    Neurological:   Seizures, well controlled Brain tumor Denies Pain    Endocrine:  Endocrine Normal    Psych:  Psychiatric Normal   Phobia and Claustrophobia.         Physical Exam  General: Well nourished and Cooperative    Airway:  Mallampati: II / I  Mouth Opening: Normal  TM Distance: Normal  Tongue: Normal  Neck ROM: Normal ROM    Dental:  Intact    Chest/Lungs:  Clear to auscultation, Normal Respiratory Rate    Heart:  Rate: Normal        Anesthesia Plan  Type of Anesthesia, risks & benefits discussed:    Anesthesia Type: Gen ETT  Intra-op Monitoring Plan: Standard ASA Monitors and Art Line  Post Op Pain Control Plan: multimodal analgesia and IV/PO Opioids PRN  Induction:  IV  Airway Plan: , Post-Induction  Informed Consent: Informed consent signed with the Patient and all parties understand the risks and agree with anesthesia plan.  All questions answered.   ASA Score: 2  Day of Surgery Review of History & Physical: H&P Update referred to the surgeon/provider.  Anesthesia Plan Notes: Scalp blocks    Ready For Surgery From Anesthesia Perspective.     .

## 2023-01-12 NOTE — PLAN OF CARE
Certification of Assistant at Surgery       Surgery Date: 1/12/2023     Participating Surgeons:  Surgeon(s) and Role:     * Wellington Park MD - Primary     * Magali Gonzalez PA-C - Assisting     Procedures:  Procedure(s) (LRB):  CRANIOTOMY, USING FRAMELESS STEREOTAXY (Left)    Assistant Surgeon's Certification of Necessity:  I understand that section 1842 (b) (6) (d) of the Social Security Act generally prohibits Medicare Part B reasonable charge payment for the services of assistants at surgery in teaching hospitals when qualified residents are available to furnish such services. I certify that the services for which payment is claimed were medically necessary, and that no qualified resident was available to perform the services. I further understand that these services are subject to post-payment review by the Medicare carrier.      Magali Gonzalez PA-C    01/12/2023  3:25 PM

## 2023-01-12 NOTE — ANESTHESIA PROCEDURE NOTES
Peripheral Block    Patient location during procedure: OR   Block not for primary anesthetic.  Reason for block: at surgeon's request and post-op pain management   Post-op Pain Location: bilateral scalp   Start time: 1/12/2023 11:37 AM  Timeout: 1/12/2023 11:35 AM   End time: 1/12/2023 11:46 AM    Staffing  Authorizing Provider: Da Cope MD  Performing Provider: Da Cope MD    Preanesthetic Checklist  Completed: patient identified, IV checked, site marked, risks and benefits discussed, surgical consent, monitors and equipment checked, pre-op evaluation and timeout performed  Peripheral Block  Patient position: supine  Prep: ChloraPrep  Patient monitoring: heart rate, cardiac monitor, continuous pulse ox, continuous capnometry and frequent blood pressure checks  Block type: supratrochlear, greater occipital, lesser occipital, supraorbital, zygomaticotemporal and auriculotemporal  Laterality: bilateral  Injection technique: single shot  Needle  Needle type: Quincke   Needle gauge: 22 G  Needle length: 2 in  Needle localization: anatomical landmarks  Needle insertion depth: 4 cm     Assessment  Injection assessment: negative aspiration  Heart rate change: no  Slow fractionated injection: no    Medications:    Medications: ropivacaine (NAROPIN) injection 0.5% - Perineural   40 mL - 1/12/2023 11:35:00 AM    Additional Notes  With epi 1:200K

## 2023-01-13 VITALS
WEIGHT: 165 LBS | SYSTOLIC BLOOD PRESSURE: 129 MMHG | OXYGEN SATURATION: 99 % | TEMPERATURE: 98 F | HEIGHT: 70 IN | BODY MASS INDEX: 23.62 KG/M2 | RESPIRATION RATE: 17 BRPM | HEART RATE: 88 BPM | DIASTOLIC BLOOD PRESSURE: 72 MMHG

## 2023-01-13 LAB
ALBUMIN SERPL BCP-MCNC: 3.3 G/DL (ref 3.5–5.2)
ALP SERPL-CCNC: 84 U/L (ref 55–135)
ALT SERPL W/O P-5'-P-CCNC: 45 U/L (ref 10–44)
ANION GAP SERPL CALC-SCNC: 10 MMOL/L (ref 8–16)
AST SERPL-CCNC: 22 U/L (ref 10–40)
BASOPHILS # BLD AUTO: ABNORMAL K/UL (ref 0–0.2)
BASOPHILS NFR BLD: 0 % (ref 0–1.9)
BILIRUB SERPL-MCNC: 0.2 MG/DL (ref 0.1–1)
BUN SERPL-MCNC: 17 MG/DL (ref 6–20)
CALCIUM SERPL-MCNC: 9.2 MG/DL (ref 8.7–10.5)
CHLORIDE SERPL-SCNC: 106 MMOL/L (ref 95–110)
CO2 SERPL-SCNC: 20 MMOL/L (ref 23–29)
CREAT SERPL-MCNC: 0.8 MG/DL (ref 0.5–1.4)
DIFFERENTIAL METHOD: ABNORMAL
EOSINOPHIL # BLD AUTO: ABNORMAL K/UL (ref 0–0.5)
EOSINOPHIL NFR BLD: 0 % (ref 0–8)
ERYTHROCYTE [DISTWIDTH] IN BLOOD BY AUTOMATED COUNT: 13.3 % (ref 11.5–14.5)
EST. GFR  (NO RACE VARIABLE): >60 ML/MIN/1.73 M^2
GLUCOSE SERPL-MCNC: 146 MG/DL (ref 70–110)
HCT VFR BLD AUTO: 37.9 % (ref 40–54)
HGB BLD-MCNC: 12.5 G/DL (ref 14–18)
IMM GRANULOCYTES # BLD AUTO: ABNORMAL K/UL (ref 0–0.04)
IMM GRANULOCYTES NFR BLD AUTO: ABNORMAL % (ref 0–0.5)
LYMPHOCYTES # BLD AUTO: ABNORMAL K/UL (ref 1–4.8)
LYMPHOCYTES NFR BLD: 5 % (ref 18–48)
MAGNESIUM SERPL-MCNC: 1.6 MG/DL (ref 1.6–2.6)
MCH RBC QN AUTO: 26.9 PG (ref 27–31)
MCHC RBC AUTO-ENTMCNC: 33 G/DL (ref 32–36)
MCV RBC AUTO: 82 FL (ref 82–98)
MONOCYTES # BLD AUTO: ABNORMAL K/UL (ref 0.3–1)
MONOCYTES NFR BLD: 3 % (ref 4–15)
NEUTROPHILS NFR BLD: 89 % (ref 38–73)
NEUTS BAND NFR BLD MANUAL: 3 %
NRBC BLD-RTO: 0 /100 WBC
PHOSPHATE SERPL-MCNC: 2.5 MG/DL (ref 2.7–4.5)
PLATELET # BLD AUTO: 585 K/UL (ref 150–450)
PLATELET BLD QL SMEAR: ABNORMAL
PMV BLD AUTO: 9.8 FL (ref 9.2–12.9)
POCT GLUCOSE: 137 MG/DL (ref 70–110)
POCT GLUCOSE: 142 MG/DL (ref 70–110)
POTASSIUM SERPL-SCNC: 4.1 MMOL/L (ref 3.5–5.1)
PROT SERPL-MCNC: 6.6 G/DL (ref 6–8.4)
RBC # BLD AUTO: 4.64 M/UL (ref 4.6–6.2)
SODIUM SERPL-SCNC: 136 MMOL/L (ref 136–145)
WBC # BLD AUTO: 28.26 K/UL (ref 3.9–12.7)

## 2023-01-13 PROCEDURE — 85027 COMPLETE CBC AUTOMATED: CPT

## 2023-01-13 PROCEDURE — 97161 PT EVAL LOW COMPLEX 20 MIN: CPT

## 2023-01-13 PROCEDURE — 99233 SBSQ HOSP IP/OBS HIGH 50: CPT | Mod: ,,, | Performed by: INTERNAL MEDICINE

## 2023-01-13 PROCEDURE — 99024 POSTOP FOLLOW-UP VISIT: CPT | Mod: ,,, | Performed by: PHYSICIAN ASSISTANT

## 2023-01-13 PROCEDURE — 99233 PR SUBSEQUENT HOSPITAL CARE,LEVL III: ICD-10-PCS | Mod: ,,, | Performed by: INTERNAL MEDICINE

## 2023-01-13 PROCEDURE — 83735 ASSAY OF MAGNESIUM: CPT

## 2023-01-13 PROCEDURE — 63600175 PHARM REV CODE 636 W HCPCS: Performed by: PHYSICIAN ASSISTANT

## 2023-01-13 PROCEDURE — 97530 THERAPEUTIC ACTIVITIES: CPT

## 2023-01-13 PROCEDURE — 97165 OT EVAL LOW COMPLEX 30 MIN: CPT

## 2023-01-13 PROCEDURE — 25000003 PHARM REV CODE 250

## 2023-01-13 PROCEDURE — 94761 N-INVAS EAR/PLS OXIMETRY MLT: CPT

## 2023-01-13 PROCEDURE — 80053 COMPREHEN METABOLIC PANEL: CPT

## 2023-01-13 PROCEDURE — 99024 PR POST-OP FOLLOW-UP VISIT: ICD-10-PCS | Mod: ,,, | Performed by: PHYSICIAN ASSISTANT

## 2023-01-13 PROCEDURE — 84100 ASSAY OF PHOSPHORUS: CPT

## 2023-01-13 PROCEDURE — 85007 BL SMEAR W/DIFF WBC COUNT: CPT

## 2023-01-13 PROCEDURE — 97116 GAIT TRAINING THERAPY: CPT

## 2023-01-13 PROCEDURE — 63600175 PHARM REV CODE 636 W HCPCS

## 2023-01-13 RX ORDER — DEXAMETHASONE 2 MG/1
TABLET ORAL
Qty: 75 TABLET | Refills: 0 | Status: SHIPPED | OUTPATIENT
Start: 2023-01-13 | End: 2023-06-02

## 2023-01-13 RX ORDER — GLUCAGON 1 MG
1 KIT INJECTION
Status: DISCONTINUED | OUTPATIENT
Start: 2023-01-13 | End: 2023-01-13

## 2023-01-13 RX ORDER — IBUPROFEN 200 MG
16 TABLET ORAL
Status: DISCONTINUED | OUTPATIENT
Start: 2023-01-13 | End: 2023-01-13

## 2023-01-13 RX ORDER — INSULIN ASPART 100 [IU]/ML
0-5 INJECTION, SOLUTION INTRAVENOUS; SUBCUTANEOUS
Status: DISCONTINUED | OUTPATIENT
Start: 2023-01-13 | End: 2023-01-13 | Stop reason: SDUPTHER

## 2023-01-13 RX ORDER — IBUPROFEN 200 MG
24 TABLET ORAL
Status: DISCONTINUED | OUTPATIENT
Start: 2023-01-13 | End: 2023-01-13

## 2023-01-13 RX ORDER — OXYCODONE AND ACETAMINOPHEN 5; 325 MG/1; MG/1
1 TABLET ORAL EVERY 4 HOURS PRN
Qty: 56 TABLET | Refills: 0 | Status: SHIPPED | OUTPATIENT
Start: 2023-01-13 | End: 2023-05-11

## 2023-01-13 RX ORDER — SODIUM,POTASSIUM PHOSPHATES 280-250MG
2 POWDER IN PACKET (EA) ORAL EVERY 4 HOURS
Status: COMPLETED | OUTPATIENT
Start: 2023-01-13 | End: 2023-01-13

## 2023-01-13 RX ORDER — FLUCONAZOLE 2 MG/ML
INJECTION, SOLUTION INTRAVENOUS
Status: DISCONTINUED
Start: 2023-01-13 | End: 2023-01-13 | Stop reason: HOSPADM

## 2023-01-13 RX ADMIN — GABAPENTIN 300 MG: 300 CAPSULE ORAL at 08:01

## 2023-01-13 RX ADMIN — HEPARIN SODIUM 5000 UNITS: 5000 INJECTION INTRAVENOUS; SUBCUTANEOUS at 01:01

## 2023-01-13 RX ADMIN — LEVETIRACETAM 500 MG: 500 TABLET, FILM COATED ORAL at 08:01

## 2023-01-13 RX ADMIN — DEXAMETHASONE SODIUM PHOSPHATE 4 MG: 4 INJECTION INTRA-ARTICULAR; INTRALESIONAL; INTRAMUSCULAR; INTRAVENOUS; SOFT TISSUE at 12:01

## 2023-01-13 RX ADMIN — FAMOTIDINE 20 MG: 20 TABLET ORAL at 08:01

## 2023-01-13 RX ADMIN — DEXAMETHASONE SODIUM PHOSPHATE 4 MG: 4 INJECTION INTRA-ARTICULAR; INTRALESIONAL; INTRAMUSCULAR; INTRAVENOUS; SOFT TISSUE at 06:01

## 2023-01-13 RX ADMIN — ACETAMINOPHEN 650 MG: 325 TABLET ORAL at 09:01

## 2023-01-13 RX ADMIN — FENTANYL CITRATE 25 MCG: 50 INJECTION, SOLUTION INTRAMUSCULAR; INTRAVENOUS at 12:01

## 2023-01-13 RX ADMIN — FENTANYL CITRATE 25 MCG: 50 INJECTION, SOLUTION INTRAMUSCULAR; INTRAVENOUS at 06:01

## 2023-01-13 RX ADMIN — POTASSIUM & SODIUM PHOSPHATES POWDER PACK 280-160-250 MG 2 PACKET: 280-160-250 PACK at 01:01

## 2023-01-13 RX ADMIN — OXYCODONE HYDROCHLORIDE 5 MG: 5 TABLET ORAL at 09:01

## 2023-01-13 RX ADMIN — OXYCODONE HYDROCHLORIDE 5 MG: 5 TABLET ORAL at 03:01

## 2023-01-13 RX ADMIN — POTASSIUM & SODIUM PHOSPHATES POWDER PACK 280-160-250 MG 2 PACKET: 280-160-250 PACK at 09:01

## 2023-01-13 NOTE — DISCHARGE SUMMARY
[facility] - Neuro Critical Care  Neurosurgery  Discharge Summary      Patient Name: Marvin Pool  MRN: [id number]  Admission Date: 1/12/2023  Hospital Length of Stay: 1 days  Discharge Date and Time: 1/13/2023  2:48 PM  Attending Physician: No att. providers found   Discharging Provider: Magali Gonzalez PA-C  Primary Care Provider: Kvng Ortiz MD    HPI:   This patient is a very pleasant 47-year-old gentleman who presented to another hospital after having a seizure.  According to the patient and his wife, the patient had a seizure in his sleep.  He then had another seizure while out at lunch.  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  During that visit, he was seen by a local neurosurgeon.  That surgeon agreed with the diagnosis and recommended a higher level of care.  MRI showed a large non-enhancing mass involving the left frontal lobe.  Functional MRI confirming left hemispheric language dominance, with dorsal lateral prefrontal cortex and language SMA in close proximity to the lesion.  The patient wished to proceed with surgery.  We performed preoperative mapping using the functional MRI and DTI using [employer] software.  This tumor involved a small part of the motor cortex and the premotor cortex on the left.  It also a but it broke his area.  Given these findings we planned for a limited resection.  The risks, benefits, and alternatives were discussed with the patient's family and they wished proceed.      Procedure(s) (LRB):  CRANIOTOMY, USING FRAMELESS STEREOTAXY (Left)     Hospital Course: The patient presented to [facility] on 1/12/23 for left frontal craniotomy for tumor resection. The patient tolerated the procedure well and there were no intra-operative complications. After surgery, the patient was extubated and taken to Neuro ICU in stable condition.  Post-op MRI brain demonstrated expected post operative  changes. PT/OT were consulted, the patient progressed, and was cleared to go home. Patient will be staying at the [facility].  On 1/13/23 he was discharged home with pain medication, decadron taper, keppra and follow up appointments. The patient was tolerating a regular diet and voiding without difficulty. Activity as tolerated. At the time of discharge, the patient was neurologically stable, was afebrile, and vital signs were stable. Discharge instructions were given verbally/written to the patient and their family, including wound care and follow-up appointments, and all of their questions were answered. The patient was also given a discharge instruction sheet explaining the aforementioned discussion.  The patient verbalized understanding of instructions and agreed to the plan. They were encouraged to call the clinic with any questions they might have prior to the follow up appointments.         Goals of Care Treatment Preferences:  Code Status: Full Code      Consults: PT/OT    Significant Diagnostic Studies:   Labs:   CMP   Recent Labs   Lab 01/13/23  0303      K 4.1      CO2 20*   *   BUN 17   CREATININE 0.8   CALCIUM 9.2   PROT 6.6   ALBUMIN 3.3*   BILITOT 0.2   ALKPHOS 84   AST 22   ALT 45*   ANIONGAP 10   , CBC   Recent Labs   Lab 01/13/23  0303   WBC 28.26*   HGB 12.5*   HCT 37.9*   *    and All labs within the past 24 hours have been reviewed  Radiology: MRI: brain w/wo contrast      Specimen (24h ago, onward)    None          Pending Diagnostic Studies:     Procedure Component Value Units Date/Time    Specimen to Pathology, Surgery Neurosurgery [[id number]] Collected: 01/12/23 1346    Order Status: Sent Lab Status: In process Updated: 01/12/23 2152    Specimen: Tissue         Final Active Diagnoses:    Diagnosis Date Noted POA    PRINCIPAL PROBLEM:  Brain tumor [D49.6] 01/12/2023 Yes     Chronic    Seizures [R56.9] 01/12/2023 No     Chronic      Problems Resolved During  this Admission:      Discharged Condition: stable     Disposition: Home or Self Care    Follow Up:  Future Appointments   Date Time Provider Department Center   1/31/2023 11:30 AM Wellington Park MD [facility]        Follow-up Information     Kvng Ortiz MD. Schedule an appointment as soon as possible for a visit in 1 week(s).    Specialty: Family Medicine  Why: Hospital follow up.  Unable to schedule your follow up.  Dr. Ortiz's office is closed.  Please call to schedule.  Contact information:  [address]   [address]  [facility] [id number] [phone]                       Patient Instructions:      Notify your health care provider if you experience any of the following:  increased confusion or weakness     Notify your health care provider if you experience any of the following:  persistent dizziness, light-headedness, or visual disturbances     Notify your health care provider if you experience any of the following:  worsening rash     Notify your health care provider if you experience any of the following:  severe persistent headache     Notify your health care provider if you experience any of the following:  difficulty breathing or increased cough     Notify your health care provider if you experience any of the following:  redness, tenderness, or signs of infection (pain, swelling, redness, odor or green/yellow discharge around incision site)     Notify your health care provider if you experience any of the following:  severe uncontrolled pain     Notify your health care provider if you experience any of the following:  persistent nausea and vomiting or diarrhea     Notify your health care provider if you experience any of the following:  temperature >100.4     Activity as tolerated     Medications:  Reconciled Home Medications:      Medication List      START taking these medications    dexAMETHasone 2 MG tablet  Commonly known as: DECADRON  Take 2 tablets (4mg) every 6 hours for 3 days,  then take 2 tablet (4mg) every 8 hours for 3 days, then take 2 tablets (4mg) every 12 hours for 3 days, then take 1 tablet (2mg) every 8 hours for 3 days, then take 1 tablet (2mg) every 12 hours for 3 days, then take 1 tablet (2mg) daily for 6 days, then OFF.Tablets: 75     oxyCODONE-acetaminophen 5-325 mg per tablet  Commonly known as: PERCOCET  Take 1 tablet by mouth every 4 (four) hours as needed for Pain.        CONTINUE taking these medications    acetaminophen 500 MG tablet  Commonly known as: TYLENOL  Take 1,000 mg by mouth every 6 (six) hours as needed for Pain.     KlonoPIN 0.5 MG tablet  Generic drug: clonazePAM  Take 0.5 mg by mouth daily as needed.     levETIRAcetam 500 MG Tab  Commonly known as: KEPPRA  Take 500 mg by mouth 2 (two) times daily.     omeprazole 40 MG capsule  Commonly known as: PRILOSEC  Take 40 mg by mouth every morning.     traZODone 50 MG tablet  Commonly known as: DESYREL  Take 50 mg by mouth every evening.     zolpidem 10 mg Tab  Commonly known as: AMBIEN  Take 10 mg by mouth nightly as needed.            Magali Gonzalez PA-C  Neurosurgery  [facility] - Neuro Critical Care

## 2023-01-13 NOTE — PLAN OF CARE
Discharged from OT  Problem: Occupational Therapy  Goal: Occupational Therapy Goal  Description: Goals not set 2* no further OT needed  Outcome: Met

## 2023-01-13 NOTE — PLAN OF CARE
[facility] Care Plan    POC reviewed with Marvin Pool at 0300. Pt verbalized understanding. Questions and concerns addressed. No acute events overnight. Pt progressing toward goals. Will continue to monitor. See below and flowsheets for full assessment and VS info.     -MRI completed  -PRN oxy & fentanyl given for pain      Is this a stroke patient? no    Neuro:  [place] Coma Scale  Best Eye Response: 4-->(E4) spontaneous  Best Motor Response: 6-->(M6) obeys commands  Best Verbal Response: 5-->(V5) oriented  [place] Coma Scale Score: 15  Assessment Qualifiers: patient not sedated/intubated  Pupil PERRLA: yes     24hr Temp:  [97.6 °F (36.4 °C)-98.5 °F (36.9 °C)]     CV:   Rhythm: normal sinus rhythm  BP goals:   SBP < 160  MAP > 65    Resp:           Plan: N/A    GI/:     Diet/Nutrition Received: regular  Last Bowel Movement: 01/12/23       Intake/Output Summary (Last 24 hours) at 1/13/2023 0425  Last data filed at 1/13/2023 0303  Gross per 24 hour   Intake 2440 ml   Output 1650 ml   Net 790 ml     Unmeasured Output  Urine Occurrence: 1  Pad Count: 4 (from OR)    Labs/Accuchecks:  Recent Labs   Lab 01/13/23  0303   WBC 28.26*   RBC 4.64   HGB 12.5*   HCT 37.9*   *      Recent Labs   Lab 01/13/23  0303      K 4.1   CO2 20*      BUN 17   CREATININE 0.8   ALKPHOS 84   ALT 45*   AST 22   BILITOT 0.2    No results for input(s): PROTIME, INR, APTT, HEPANTIXA in the last 168 hours. No results for input(s): CPK, CPKMB, TROPONINI, MB in the last 168 hours.    Electrolytes: Electrolytes replaced  Accuchecks: ACHS    Gtts:      LDA/Wounds:  Lines/Drains/Airways       Arterial Line  Duration             Arterial Line 01/12/23 1210 Right Radial <1 day              Peripheral Intravenous Line  Duration                  Peripheral IV - Single Lumen 01/12/23 0945 18 G Left;Posterior Hand <1 day         Peripheral IV - Single Lumen 01/12/23 1148 16 G Right Forearm <1 day                  Wounds: No  Wound care  consulted: No

## 2023-01-13 NOTE — HOSPITAL COURSE
1/12/2023: No acute events overnight. Exam stable. Received PRN oxycodone and fentanyl for pain. Continue Decadron 4 q6h. Stable for stepdown to NSGY. SBP <160.

## 2023-01-13 NOTE — HPI
Marvin Pool is a 47 y.o. male with recently diagnosed left frontal brain mass. Per chart review, the patient was recently seen at an OSH for seizures, and a MRI at the time revealed a left frontal brain mass. He was taken to the OR today for left frontal craniotomy and tumor resection. Currently he complains of a mild HA, which he rates a 3/10 in severity. He reports no vision changes, nausea, vomiting, weakness, numbness, tingling and neck pain. The patient will be admitted to the [facility] for close monitoring and a higher level of care.

## 2023-01-13 NOTE — H&P
[facility]  Neurocritical Care  History & Physical    Admit Date: 1/12/2023  Service Date: 01/12/2023  Length of Stay: 0    Subjective:     Chief Complaint: Brain tumor    History of Present Illness: Marvin Pool is a 47 y.o. male with recently diagnosed left frontal brain mass. Per chart review, the patient was recently seen at an OSH for seizures, and a MRI at the time revealed a left frontal brain mass. He was taken to the OR today for left frontal craniotomy and tumor resection. Currently he complains of a mild HA, which he rates a 3/10 in severity. He reports no vision changes, nausea, vomiting, weakness, numbness, tingling and neck pain. The patient will be admitted to the [facility] for close monitoring and a higher level of care.       History reviewed. No pertinent past medical history.  History reviewed. No pertinent surgical history.   No current facility-administered medications on file prior to encounter.     Current Outpatient Medications on File Prior to Encounter   Medication Sig Dispense Refill    acetaminophen (TYLENOL) 500 MG tablet Take 1,000 mg by mouth every 6 (six) hours as needed for Pain.      KLONOPIN 0.5 mg tablet Take 0.5 mg by mouth daily as needed.      omeprazole (PRILOSEC) 40 MG capsule Take 40 mg by mouth every morning.      zolpidem (AMBIEN) 10 mg Tab Take 10 mg by mouth nightly as needed.      [DISCONTINUED] LEVETIRACETAM ORAL Take by mouth 2 (two) times daily.      levETIRAcetam (KEPPRA) 500 MG Tab Take 500 mg by mouth 2 (two) times daily.      traZODone (DESYREL) 50 MG tablet Take 50 mg by mouth every evening.        Allergies: Patient has no known allergies.    History reviewed. No pertinent family history.     Review of Systems   Constitutional:  Negative for chills, fatigue and fever.   HENT:  Negative for trouble swallowing.    Eyes:  Negative for visual disturbance.   Respiratory:  Negative for shortness of breath.    Cardiovascular:  Negative for  chest pain.   Gastrointestinal:  Negative for nausea and vomiting.   Genitourinary:  Negative for difficulty urinating.   Musculoskeletal:  Negative for back pain and neck pain.   Neurological:  Positive for headaches. Negative for dizziness, facial asymmetry, speech difficulty, weakness and numbness.   Psychiatric/Behavioral:  Negative for agitation and confusion.    Objective:     Vitals:    Temp: 98.5 °F (36.9 °C)  Pulse: 78  Rhythm: normal sinus rhythm  BP: 114/64  MAP (mmHg): 83  Resp: (!) 23  SpO2: 97 %    Temp  Min: 98.3 °F (36.8 °C)  Max: 98.5 °F (36.9 °C)  Pulse  Min: 77  Max: 89  BP  Min: 98/59  Max: 128/67  MAP (mmHg)  Min: 71  Max: 90  Resp  Min: 12  Max: 28  SpO2  Min: 93 %  Max: 100 %    No intake/output data recorded.   Unmeasured Output  Urine Occurrence: 1  Pad Count: 4       Physical Exam  Vitals and nursing note reviewed.   Constitutional:       General: He is not in acute distress.     Appearance: Normal appearance.      Comments: Well developed. Well nourished. Resting comfortably in bed.   HENT:      Head: Normocephalic.      Comments: Dressing in place at surgical site. C/D/I. No discharge, erythema, or warmth noted.      Right Ear: External ear normal.      Left Ear: External ear normal.      Nose: Nose normal.      Mouth/Throat:      Mouth: Mucous membranes are moist.      Pharynx: Oropharynx is clear.   Eyes:      Extraocular Movements: Extraocular movements intact.      Pupils: Pupils are equal, round, and reactive to light.   Cardiovascular:      Rate and Rhythm: Normal rate and regular rhythm.      Pulses: Normal pulses.      Heart sounds: Normal heart sounds.   Pulmonary:      Effort: Pulmonary effort is normal. No respiratory distress.   Abdominal:      General: Abdomen is flat. There is no distension.   Musculoskeletal:      Right lower leg: No edema.      Left lower leg: No edema.   Skin:     General: Skin is warm and dry.   Neurological:      Mental Status: He is alert.       Comments: E4V5M6  AA&Ox4. Speech fluent. Answers questions appropriately. Follows commands briskly.  Visual fields full to confrontation bilaterally. PERRL. EOMI. No facial asymmetry. Conversational hearing intact. Shoulder shrug intact.   Moves all extremities spontaneously, symmetrically, & against gravity. SILT in all 4 extremities. No pronator drift.      Gait & coordination exams deferred.     Today I personally reviewed pertinent medications, lines/drains/airways, imaging,.      Assessment/Plan:     Neuro  Seizures  History of,  - Continue home Keppra  - Seizure precautions    Oncology  * Brain tumor  47 y.o. male with history of left frontal brain mass admitted s/p left frontal craniotomy with tumor resection    - Admit to [facility] following  - q1h neuro checks, vitals, I/Os  - PT/INR, aPTT pending  - Daily CBC, CMP, mag, phos  - SBP < 160  - PRN labetalol, hydralazine  - PRN pain, nausea medications  - Post-op MRI pending  - Dex 4mg q6h   - Keppra 500mg BID  - HOB restriction  - Seizure precautions  - Regular diet  - PT/OT/SLP as appropriate  - VTE prophylaxis: mechanical, hold chemical in the acute setting         The patient is being Prophylaxed for:  Venous Thromboembolism with: Mechanical  Stress Ulcer with: H2B  Ventilator Pneumonia with: not applicable    Activity Orders          Diet Adult Regular (IDDSI Level 7): Regular starting at 01/12 1636        Full Code    Melissa Rico PAJuan JoseC  Neurocritical Care  [facility]

## 2023-01-13 NOTE — SUBJECTIVE & OBJECTIVE
Past Medical History:   Diagnosis Date    Brain tumor 1/12/2023    Seizures 1/12/2023     History reviewed. No pertinent surgical history.   No current facility-administered medications on file prior to encounter.     Current Outpatient Medications on File Prior to Encounter   Medication Sig Dispense Refill    acetaminophen (TYLENOL) 500 MG tablet Take 1,000 mg by mouth every 6 (six) hours as needed for Pain.      KLONOPIN 0.5 mg tablet Take 0.5 mg by mouth daily as needed.      omeprazole (PRILOSEC) 40 MG capsule Take 40 mg by mouth every morning.      zolpidem (AMBIEN) 10 mg Tab Take 10 mg by mouth nightly as needed.      levETIRAcetam (KEPPRA) 500 MG Tab Take 500 mg by mouth 2 (two) times daily.      traZODone (DESYREL) 50 MG tablet Take 50 mg by mouth every evening.        Allergies: Patient has no known allergies.    History reviewed. No pertinent family history.     Review of Systems   Constitutional:  Negative for chills, fatigue and fever.   HENT:  Negative for trouble swallowing.    Eyes:  Negative for visual disturbance.   Respiratory:  Negative for shortness of breath.    Cardiovascular:  Negative for chest pain.   Gastrointestinal:  Negative for nausea and vomiting.   Genitourinary:  Negative for difficulty urinating.   Musculoskeletal:  Negative for back pain and neck pain.   Neurological:  Positive for headaches. Negative for dizziness, facial asymmetry, speech difficulty, weakness and numbness.   Psychiatric/Behavioral:  Negative for agitation and confusion.    Objective:     Vitals:    Temp: 97.8 °F (36.6 °C)  Pulse: 90  Rhythm: normal sinus rhythm  BP: 137/82  MAP (mmHg): 110  Resp: 17  SpO2: 99 %    Temp  Min: 97.6 °F (36.4 °C)  Max: 98.5 °F (36.9 °C)  Pulse  Min: 74  Max: 95  BP  Min: 98/59  Max: 142/105  MAP (mmHg)  Min: 71  Max: 115  Resp  Min: 12  Max: 28  SpO2  Min: 93 %  Max: 100 %    01/12 0701 - 01/13 0700  In: 2440 [P.O.:440; I.V.:1500]  Out: 1950 [Urine:1950]   Unmeasured Output  Urine  Occurrence: 1  Pad Count: 4 (from OR)       Physical Exam  Vitals and nursing note reviewed.   Constitutional:       General: He is not in acute distress.     Appearance: Normal appearance.      Comments: Well developed. Well nourished. Resting comfortably in bed.   HENT:      Head: Normocephalic.      Comments: Dressing in place at surgical site. C/D/I. No discharge, erythema, or warmth noted.      Right Ear: External ear normal.      Left Ear: External ear normal.      Nose: Nose normal.      Mouth/Throat:      Mouth: Mucous membranes are moist.      Pharynx: Oropharynx is clear.   Eyes:      Extraocular Movements: Extraocular movements intact.      Pupils: Pupils are equal, round, and reactive to light.   Cardiovascular:      Rate and Rhythm: Normal rate and regular rhythm.      Pulses: Normal pulses.      Heart sounds: Normal heart sounds.   Pulmonary:      Effort: Pulmonary effort is normal. No respiratory distress.   Abdominal:      General: Abdomen is flat. There is no distension.   Musculoskeletal:      Right lower leg: No edema.      Left lower leg: No edema.   Skin:     General: Skin is warm and dry.   Neurological:      Mental Status: He is alert.      Comments: E4V5M6  AA&Ox4. Speech fluent. Answers questions appropriately. Follows commands briskly.  Visual fields full to confrontation bilaterally. PERRL. EOMI. No facial asymmetry. Conversational hearing intact. Shoulder shrug intact.   Moves all extremities spontaneously, symmetrically, & against gravity. SILT in all 4 extremities. No pronator drift.      Gait & coordination exams deferred.     Today I personally reviewed pertinent medications, lines/drains/airways, imaging,.    Physical Exam:  Nursing note and vitals reviewed.    Constitutional: No distress.   Well developed. Well nourished. Resting comfortably in bed.     Eyes: Pupils are equal, round, and reactive to light.     Cardiovascular: Normal rate, regular rhythm and normal pulses.      Psych/Behavior: He is alert.     Neurological:   E4V5M6  AA&Ox4. Speech fluent. Answers questions appropriately. Follows commands briskly.  Visual fields full to confrontation bilaterally. PERRL. EOMI. No facial asymmetry. Conversational hearing intact. Shoulder shrug intact.   Moves all extremities spontaneously, symmetrically, & against gravity. SILT in all 4 extremities. No pronator drift.

## 2023-01-13 NOTE — ASSESSMENT & PLAN NOTE
47 y.o. male with history of left frontal brain mass admitted s/p left frontal craniotomy with tumor resection    - Admit to [facility]  - NS following  - q1h neuro checks, vitals, I/Os  - PT/INR, aPTT pending  - Daily CBC, CMP, mag, phos  - SBP < 160  - PRN labetalol, hydralazine  - PRN pain, nausea medications  - Post-op MRI pending  - Dex 4mg q6h   - Keppra 500mg BID  - HOB restriction  - Seizure precautions  - Regular diet  - PT/OT/SLP as appropriate  - VTE prophylaxis: mechanical, hold chemical in the acute setting

## 2023-01-13 NOTE — OP NOTE
DATE OF PROCEDURE:  1/12/2023     SURGEON:  Wellington Park M.D., Ph.D.     ASSISTANT FOR THIS SURGERY:  Magali Gonzalez (a qualified resident was not available at the time of surgery)    PREOPERATIVE DIAGNOSES:  Left frontal brain tumor.  Seizure disorder.    POSTOPERATIVE DIAGNOSES:  Left frontal brain tumor.  Seizure disorder.    PROCEDURES PERFORMED:  1.  Left frontal craniotomy for tumor.  2.  Stealth navigation.  3.  Microsurgical technique.  4.  Preplanning of surgery with Omniscient diffusion tensor imaging.  5.  Intraoperative motor mapping.  6.  Neuromonitoring with SSEPs, MEPS, and EMG     INDICATIONS IN DETAIL:    This patient is a very pleasant 47-year-old gentleman who presented to another hospital after having a seizure.  According to the patient and his wife, the patient had a seizure in his sleep.  He then had another seizure while out at lunch.  He was seen the local hospital in [place], [place] where he had a workup that included an MRI brain showing a left frontal brain tumor.  During that visit, he was seen by a local neurosurgeon.  That surgeon agreed with the diagnosis and recommended a higher level of care.  MRI showed a large non-enhancing mass involving the left frontal lobe.  Functional MRI confirming left hemispheric language dominance, with dorsal lateral prefrontal cortex and language SMA in close proximity to the lesion.  The patient wished to proceed with surgery.  We performed preoperative mapping using the functional MRI and DTI using CogniK software.  This tumor involved a small part of the motor cortex and the premotor cortex on the left.  It also a but it broke his area.  Given these findings we planned for a limited resection.  The risks, benefits, and alternatives were discussed with the patient's family and they wished proceed.    PROCEDURE IN DETAIL:    The patient was seen in the pretreatment area and the risks, benefits and alternatives were again discussed and the  patient wished to proceed.  The patient was brought to the Operating Room and a general anesthetic was administered.  All proper lines were placed.  The patient was placed in the supine position.  The head was placed in 3-point fixation using Powers headholder.  The head was turned slightly to the right to allow access to the left frontal region.  The neck was flexed.  The Stealth navigation system was brought to the field and an accurate registration was achieved using the tracer function.  The area of tumor was traced over the patient's scalp.  The functional data had also been entered into the stealth navigation system..  The area over our entry point was marked on the patient's scalp and a line of approximately 10 cm was drawn with this in the midline.  The  patient's head was cleaned, prepped and draped in the usual fashion.  A lidocaine-bupivacaine mix was infiltrated under the skin.  An incision was made using a #10 blade and carried down to the calvarium using Bovie cautery.  Then, the soft tissue was detached from the skull using a periosteal elevator and held in retraction using a cerebellar retractor.  Two juliana holes were made, one juliana hole was made at the superior aspect of the exposure and the other at the inferior aspect of the exposure.  Once this was complete, using a high-speed Havsjo Delikatesser drill with a craniotome attachment, we turned a craniotomy flap.  Once this was complete, we verified our location again with stealth navigation and the dura was opened with the flap facing the superior sagittal sinus.   Initially, our attention was turned to motor mapping.  Using the Ojemann stimulator we stimulated the area over the tumor.  We were able to determine that there was no primary motor cortex in that region.  We were able to find face motor cortex at posterior aspect of our exposure.  Based on this information and the location of Broca's, we made a corticectomy.  We then debulked this tumor.  Tumor  was sent to pathology for intraoperative consultation.  They are finding was that this was consistent with a lower grade glioma.  At this point we had decompressed and were able to make a definitive diagnosis.  Wound was irrigated copiously and all bleeding points were coagulated.  We lined the resection cavity with Surgicel and glued in place with [place] Seal.  The dura was closed using 4-0 Nurolon sutures in the usual fashion.  A dry piece of Gelfoam was placed over the dural closure.  Once this was complete, the soft tissues  were closed in layers with staples in the skin.      A clean dressing was placed.  The patient was awakened by the Anesthesia staff.  At the point the patient was awake and following commands, he was extubated.    The total time of motor mapping was approximately 30 minutes.  We also performed SSEP and MEP used during the operation.  There were no changes in either of these.     Specimen included tumor taken for intraoperative and permanent evaluation.     EBL was approximately 50 mL.     There were no intraprocedural complications.     All counts were correct at the end of surgery.     Dr. Wellington Park was present during the entire procedure.

## 2023-01-13 NOTE — PROGRESS NOTES
[facility]  Neurocritical Care  Progress Note    Admit Date: 1/12/2023  Service Date: 01/13/2023  Length of Stay: 1    Subjective:     Chief Complaint: Brain tumor    History of Present Illness: Marvin Pool is a 47 y.o. male with recently diagnosed left frontal brain mass. Per chart review, the patient was recently seen at an OSH for seizures, and a MRI at the time revealed a left frontal brain mass. He was taken to the OR today for left frontal craniotomy and tumor resection. Currently he complains of a mild HA, which he rates a 3/10 in severity. He reports no vision changes, nausea, vomiting, weakness, numbness, tingling and neck pain. The patient will be admitted to the [facility] for close monitoring and a higher level of care.       Hospital Course: 1/12/2023: No acute events overnight. Exam stable. Received PRN oxycodone and fentanyl for pain. Continue Decadron 4 q6h. Stable for stepdown to NSGY. SBP <160.       Interval History:  No acute events overnight. Exam stable. Received PRN oxycodone and fentanyl for pain. Continue Decadron 4 q6h. Stable for stepdown to NSGY. SBP <160.     Review of Systems   Constitutional:  Negative for chills, fatigue and fever.   HENT:  Negative for trouble swallowing.    Eyes:  Negative for visual disturbance.   Respiratory:  Negative for shortness of breath.    Cardiovascular:  Negative for chest pain.   Gastrointestinal:  Negative for nausea and vomiting.   Genitourinary:  Negative for difficulty urinating.   Musculoskeletal:  Negative for back pain and neck pain.   Neurological:  Positive for headaches. Negative for dizziness, facial asymmetry, speech difficulty, weakness and numbness.   Psychiatric/Behavioral:  Negative for agitation and confusion.      Objective:     Vitals:  Temp: 97.8 °F (36.6 °C)  Pulse: 88  Rhythm: normal sinus rhythm  BP: 129/72  MAP (mmHg): 110  Resp: 17  SpO2: 99 %    Temp  Min: 97.6 °F (36.4 °C)  Max: 98.2 °F (36.8 °C)  Pulse   Min: 74  Max: 95  BP  Min: 102/57  Max: 157/88  MAP (mmHg)  Min: 77  Max: 115  Resp  Min: 15  Max: 26  SpO2  Min: 93 %  Max: 100 %    01/12 0701 - 01/13 0700  In: 2440 [P.O.:440; I.V.:1500]  Out: 1950 [Urine:1950]   Unmeasured Output  Urine Occurrence: 1  Pad Count: 4 (from OR)       Physical Exam  Vitals and nursing note reviewed.   Constitutional:       General: He is not in acute distress.     Appearance: Normal appearance.      Comments: Well developed. Well nourished. Resting comfortably in bed.   HENT:      Head: Normocephalic.      Comments: Dressing in place at surgical site. C/D/I. No discharge, erythema, or warmth noted.      Right Ear: External ear normal.      Left Ear: External ear normal.      Nose: Nose normal.      Mouth/Throat:      Mouth: Mucous membranes are moist.      Pharynx: Oropharynx is clear.   Eyes:      Extraocular Movements: Extraocular movements intact.      Pupils: Pupils are equal, round, and reactive to light.   Cardiovascular:      Rate and Rhythm: Normal rate and regular rhythm.      Pulses: Normal pulses.      Heart sounds: Normal heart sounds.   Pulmonary:      Effort: Pulmonary effort is normal. No respiratory distress.   Abdominal:      General: Abdomen is flat. There is no distension.   Musculoskeletal:      Right lower leg: No edema.      Left lower leg: No edema.   Skin:     General: Skin is warm and dry.   Neurological:      Mental Status: He is alert.      Comments: E4V5M6  AA&Ox4. Speech fluent. Answers questions appropriately. Follows commands briskly.  Visual fields full to confrontation bilaterally. PERRL. EOMI. No facial asymmetry. Conversational hearing intact. Shoulder shrug intact.   Moves all extremities spontaneously, symmetrically, & against gravity. SILT in all 4 extremities. No pronator drift.          Medications:  Continuous ScheduleddexAMETHasone, 4 mg, Q6H  famotidine, 20 mg, BID  fluconazole, ,   gabapentin, 300 mg, TID  heparin (porcine), 5,000 Units,  Q8H  levETIRAcetam, 500 mg, BID    PRNacetaminophen, 650 mg, Q6H PRN  clonazePAM, 0.5 mg, BID PRN  dextrose 10%, 12.5 g, PRN  dextrose 10%, 25 g, PRN  fentaNYL, 25 mcg, Q4H PRN  glucagon (human recombinant), 1 mg, PRN  glucose, 16 g, PRN  glucose, 24 g, PRN  hydrALAZINE, 10 mg, Q6H PRN  insulin aspart U-100, 0-5 Units, QID (AC + HS) PRN  labetalol, 10 mg, Q4H PRN  melatonin, 6 mg, Nightly PRN  ondansetron, 4 mg, Q6H PRN  oxyCODONE, 5 mg, Q6H PRN      Today I personally reviewed pertinent medications, imaging, cardiology results, laboratory results, microbiology results, notably:    Diet  Diet Adult Regular (IDDSI Level 7)  Diet Adult Regular (IDDSI Level 7)          Assessment/Plan:     Neuro  Seizures  History of,  - Continue home Keppra  - Seizure precautions    Oncology  * Brain tumor  47 y.o. male with history of left frontal brain mass admitted s/p left frontal craniotomy with tumor resection      - Stable for stepdown and continue to be followed by NSGY  - NSGY following  - q1h neuro checks, vitals, I/Os  - Daily CBC, CMP, mag, phos  - SBP < 160  - PRN labetalol, hydralazine  - PRN pain, nausea medications  - Post-op MRI pending  - Dex 4mg q6h   - Keppra 500mg BID  - HOB restriction  - Seizure precautions  - Regular diet  - PT/OT/SLP as appropriate  - VTE prophylaxis: mechanical, hold chemical in the acute setting           The patient is being Prophylaxed for:  Venous Thromboembolism with: Mechanical or Chemical  Stress Ulcer with: None  Ventilator Pneumonia with: none    Activity Orders          Diet Adult Regular (IDDSI Level 7): Regular starting at 01/12 1636        Full Code    Russ Blair DO  Neurocritical Care  [facility]

## 2023-01-13 NOTE — ASSESSMENT & PLAN NOTE
48 y/o F  S/p   L frontal craniotomy for tumor resection  On 1/12/23    TTF  DC cheng and chuckie  hsq  Abx prophy   hsq   Pt/ot

## 2023-01-13 NOTE — PT/OT/SLP EVAL
"Occupational Therapy   Evaluation and Discharge Note    Name: Marvin Pool  MRN: [id number]  Admitting Diagnosis: Brain tumor  Recent Surgery: Procedure(s) (LRB):  CRANIOTOMY, USING FRAMELESS STEREOTAXY (Left) 1 Day Post-Op    Recommendations:     Discharge Recommendations: home  Discharge Equipment Recommendations: none  Barriers to discharge:  None    Assessment:     Marvin Pool is a 47 y.o. male with a medical diagnosis of Brain tumor. At this time, patient is functioning at their prior level of function and does not require further acute OT services.     Plan:     During this hospitalization, patient does not require further acute OT services.  Please re-consult if situation changes.    Plan of Care Reviewed with: patient    Subjective     Patient:  "New Year's Rocio I started having seizures and now here I am."     Occupational Profile:  Patient resides in [place] with wife in one story home with no steps to enter.  Patient is right handed.  PTA patient independent with ADLs including driving.  Works: manager.  Hobbies: bowling, golf, reading, cooking.  Currently owns no DME.       Pain/Comfort:  Pain Rating 1: 0/10  Pain Rating Post-Intervention 1: 0/10    Patients cultural, spiritual, Confucianist conflicts given the current situation: no    Objective:     Communicated with: Nurse prior to session.  Patient found supine with bed alarm, telemetry, arterial line, blood pressure cuff, peripheral IV upon OT entry to room.    General Precautions: Standard, aspiration, fall  Orthopedic Precautions: N/A  Braces: N/A  Respiratory Status: Room air     Occupational Performance:    Bed Mobility:    Patient completed Rolling/Turning to Left with  independence  Patient completed Rolling/Turning to Right with independence  Patient completed Supine to Sit with independence  Patient completed Sit to Supine with independence    Functional Mobility/Transfers:  Patient completed Sit <> Stand Transfer with modified " independence  with  no assistive device   Patient completed Bed <> Chair Transfer using Stand Pivot technique with modified independence with no assistive device    Activities of Daily Living:  Grooming: modified independence    Upper Body Dressing: modified independence    Lower Body Dressing: modified independence    Toileting: modified independence      Cognitive/Visual Perceptual:  Cognitive/Psychosocial Skills:     -       Oriented to: Person, Place, Time, and Situation   -       Follows Commands/attention:Follows one-step commands  -       Communication: clear/fluent  -       Mood/Affect/Coping skills/emotional control: Appropriate to situation and Cooperative    Physical Exam:  Postural examination/scapula alignment:    -       Rounded shoulders  Skin integrity: Visible skin intact  Edema:  None noted  Upper Extremity Range of Motion:     -       Right Upper Extremity: WNL  -       Left Upper Extremity: WNL  Upper Extremity Strength:    -       Right Upper Extremity: WNL  -       Left Upper Extremity: WNL    AMPAC 6 Click ADL:  AMPAC Total Score: 24    Treatment & Education:  Patient education provided on role of OT and need for no further OT upon discharge.  Patient alert and oriented x 3; able to follow 4/4 one step commands.  Patient attentive and interactive throughout the session.    Patient left supine with all lines intact, call button in reach, and bed alarm on    GOALS:   Multidisciplinary Problems       Occupational Therapy Goals       Not on file              Multidisciplinary Problems (Resolved)          Problem: Occupational Therapy    Goal Priority Disciplines Outcome Interventions   Occupational Therapy Goal   (Resolved)     OT, PT/OT Met    Description: Goals not set 2* no further OT needed                       History:     Past Medical History:   Diagnosis Date    Brain tumor 1/12/2023    Seizures 1/12/2023       History reviewed. No pertinent surgical history.    Time Tracking:     OT Date  of Treatment: 01/13/23  OT Start Time: 0656  OT Stop Time: 0709  OT Total Time (min): 13 min    Billable Minutes:Evaluation 5  Therapeutic Activity 8    1/13/2023

## 2023-01-13 NOTE — PROGRESS NOTES
[facility]  Neurosurgery  Progress Note    Subjective:      interval update: no acute overnight events doing well post op    Post-Op Info:  Procedure(s) (LRB):  CRANIOTOMY, USING FRAMELESS STEREOTAXY (Left)   1 Day Post-Op     Past Medical History:   Diagnosis Date    Brain tumor 1/12/2023    Seizures 1/12/2023     History reviewed. No pertinent surgical history.   No current facility-administered medications on file prior to encounter.     Current Outpatient Medications on File Prior to Encounter   Medication Sig Dispense Refill    acetaminophen (TYLENOL) 500 MG tablet Take 1,000 mg by mouth every 6 (six) hours as needed for Pain.      KLONOPIN 0.5 mg tablet Take 0.5 mg by mouth daily as needed.      omeprazole (PRILOSEC) 40 MG capsule Take 40 mg by mouth every morning.      zolpidem (AMBIEN) 10 mg Tab Take 10 mg by mouth nightly as needed.      levETIRAcetam (KEPPRA) 500 MG Tab Take 500 mg by mouth 2 (two) times daily.      traZODone (DESYREL) 50 MG tablet Take 50 mg by mouth every evening.        Allergies: Patient has no known allergies.    History reviewed. No pertinent family history.     Review of Systems   Constitutional:  Negative for chills, fatigue and fever.   HENT:  Negative for trouble swallowing.    Eyes:  Negative for visual disturbance.   Respiratory:  Negative for shortness of breath.    Cardiovascular:  Negative for chest pain.   Gastrointestinal:  Negative for nausea and vomiting.   Genitourinary:  Negative for difficulty urinating.   Musculoskeletal:  Negative for back pain and neck pain.   Neurological:  Positive for headaches. Negative for dizziness, facial asymmetry, speech difficulty, weakness and numbness.   Psychiatric/Behavioral:  Negative for agitation and confusion.    Objective:     Vitals:    Temp: 97.8 °F (36.6 °C)  Pulse: 90  Rhythm: normal sinus rhythm  BP: 137/82  MAP (mmHg): 110  Resp: 17  SpO2: 99 %    Temp  Min: 97.6 °F (36.4 °C)  Max: 98.5 °F (36.9  °C)  Pulse  Min: 74  Max: 95  BP  Min: 98/59  Max: 142/105  MAP (mmHg)  Min: 71  Max: 115  Resp  Min: 12  Max: 28  SpO2  Min: 93 %  Max: 100 %    01/12 0701 - 01/13 0700  In: 2440 [P.O.:440; I.V.:1500]  Out: 1950 [Urine:1950]   Unmeasured Output  Urine Occurrence: 1  Pad Count: 4 (from OR)       Physical Exam  Vitals and nursing note reviewed.   Constitutional:       General: He is not in acute distress.     Appearance: Normal appearance.      Comments: Well developed. Well nourished. Resting comfortably in bed.   HENT:      Head: Normocephalic.      Comments: Dressing in place at surgical site. C/D/I. No discharge, erythema, or warmth noted.      Right Ear: External ear normal.      Left Ear: External ear normal.      Nose: Nose normal.      Mouth/Throat:      Mouth: Mucous membranes are moist.      Pharynx: Oropharynx is clear.   Eyes:      Extraocular Movements: Extraocular movements intact.      Pupils: Pupils are equal, round, and reactive to light.   Cardiovascular:      Rate and Rhythm: Normal rate and regular rhythm.      Pulses: Normal pulses.      Heart sounds: Normal heart sounds.   Pulmonary:      Effort: Pulmonary effort is normal. No respiratory distress.   Abdominal:      General: Abdomen is flat. There is no distension.   Musculoskeletal:      Right lower leg: No edema.      Left lower leg: No edema.   Skin:     General: Skin is warm and dry.   Neurological:      Mental Status: He is alert.      Comments: E4V5M6  AA&Ox4. Speech fluent. Answers questions appropriately. Follows commands briskly.  Visual fields full to confrontation bilaterally. PERRL. EOMI. No facial asymmetry. Conversational hearing intact. Shoulder shrug intact.   Moves all extremities spontaneously, symmetrically, & against gravity. SILT in all 4 extremities. No pronator drift.      Gait & coordination exams deferred.     Today I personally reviewed pertinent medications, lines/drains/airways, imaging,.    Physical Exam:  Nursing  note and vitals reviewed.    Constitutional: No distress.   Well developed. Well nourished. Resting comfortably in bed.     Eyes: Pupils are equal, round, and reactive to light.     Cardiovascular: Normal rate, regular rhythm and normal pulses.     Psych/Behavior: He is alert.     Neurological:   E4V5M6  AA&Ox4. Speech fluent. Answers questions appropriately. Follows commands briskly.  Visual fields full to confrontation bilaterally. PERRL. EOMI. No facial asymmetry. Conversational hearing intact. Shoulder shrug intact.   Moves all extremities spontaneously, symmetrically, & against gravity. SILT in all 4 extremities. No pronator drift.      Assessment/Plan:     * Brain tumor  46 y/o F  S/p   L frontal craniotomy for tumor resection  On 1/12/23    TTF  DC cheng and chuckie  hsq  Abx prophy   hsq   Pt/ot           Mak Williamson MD  Neurosurgery  [facility] - Neuro Critical Care

## 2023-01-13 NOTE — PLAN OF CARE
[facility]  Discharge Final Note    Primary Care Provider: Kvng Ortiz MD    Expected Discharge Date: 1/13/2023    Final Discharge Note (most recent)       Final Note - 01/13/23 1434          Final Note    Assessment Type Final Discharge Note     Anticipated Discharge Disposition Home or Self Care     What phone number can be called within the next 1-3 days to see how you are doing after discharge? [phone]                     Important Message from Medicare  NA             Contact Info       Kvng Ortiz MD   Specialty: Family Medicine   Relationship: PCP - General    [facility]   [address]  [facility] [address]   Phone: [phone]       Next Steps: Schedule an appointment as soon as possible for a visit in 1 week(s)    Instructions: Hospital follow up.  Unable to schedule your follow up.  Dr. Ortiz's office is closed.  Please call to schedule.

## 2023-01-13 NOTE — PLAN OF CARE
[facility]  Initial Discharge Assessment       Primary Care Provider: Kvng Ortiz MD    Admission Diagnosis: Brain tumor [D49.6]    Admission Date: 1/12/2023  Expected Discharge Date: 1/13/2023    Discharge Barriers Identified: None    Payor: BLUE CROSS BLUE SHIELD / Plan: BCBS ALL OUT OF STATE / Product Type: PPO /     Extended Emergency Contact Information  Primary Emergency Contact: Julieta Pool  Address: [address] [place] [address] [facility]  Home Phone: [phone]  Mobile Phone: [phone]  Relation: Spouse  Secondary Emergency Contact: Minerva Pool  Address: [address] [place] [address] [place]  Mobile Phone: [phone]  Relation: Mother    Discharge Plan A: Home with family  Discharge Plan B: Home with family      [facility] [place], [place] - [address]  [facility] [address]  Phone: [phone] Fax: [phone]        Transferred from: home    Past Medical History:   Diagnosis Date    Brain tumor 1/12/2023    Seizures 1/12/2023         CM met with patient and Julieta Pool  (wife) [phone] in room for Discharge Planning Assessment.  Patient is able to answer questions.  Per patient, he lives with his wife in a single story house with 0 step(s) to enter.   Per patient, he was independent with ADLS and used no dme for ambulation.  Patient will have assistance from his wife upon discharge.   Discharge Planning Booklet given to patient/family and discussed.  All questions addressed.  CM will follow for needs.      Initial Assessment (most recent)       Adult Discharge Assessment - 01/13/23 5038          Discharge Assessment    Assessment Type Discharge Planning Assessment     Confirmed/corrected address, phone number and insurance Yes     Confirmed Demographics Correct on Facesheet     Source of Information patient     Communicated GISELLE with patient/caregiver  Yes     Reason For Admission Brain tumor     People in Home spouse     Facility Arrived From: home     Do you expect to return to your current living situation? Yes     Do you have help at home or someone to help you manage your care at home? Yes     Who are your caregiver(s) and their phone number(s)? Julieta Pool  (wife) [phone]     Prior to hospitilization cognitive status: Alert/Oriented     Current cognitive status: Alert/Oriented     Walking or Climbing Stairs --   independent    Home Accessibility --   independent    Home Layout Able to live on 1st floor     Equipment Currently Used at Home none     Readmission within 30 days? Yes     Patient currently being followed by outpatient case management? No     Do you currently have service(s) that help you manage your care at home? No     Do you take prescription medications? Yes     Do you have prescription coverage? Yes     Coverage BCBS     Do you have any problems affording any of your prescribed medications? No     Is the patient taking medications as prescribed? yes     Who is going to help you get home at discharge? Julieta Pool  (wife) [phone]     How do you get to doctors appointments? family or friend will provide;car, drives self     Are you on dialysis? No     Do you take coumadin? No     Discharge Plan A Home with family     Discharge Plan B Home with family     DME Needed Upon Discharge  none     Discharge Plan discussed with: Patient;Spouse/sig other     Name(s) and Number(s) Julieta Pool  (wife) [phone] (at bedside)     Discharge Barriers Identified None        OTHER    Name(s) of People in Home Julieta Pool  (wife) [phone]                     Readmission Assessment (most recent)       Readmission Assessment - 01/13/23 1432          Readmission    Why were you hospitalized in the last 30 days? seizure     Why were you readmitted? Planned readmission     When you left the hospital how did you feel? ok per patient      When you left the hospital where did you go? Home with Family     Did patient/caregiver refused recommended DC plan? No     Tell me about what happened between when you left the hospital and the day you returned. Planned surgery     Was this a planned readmission? Yes                    Annabella Glover RN, CCRN-K, [facility]  Neuro-Critical Care   [phone]

## 2023-01-13 NOTE — PLAN OF CARE
Problem: Physical Therapy  Goal: Physical Therapy Goal  Outcome: Met   PT D/Ricky due to pt independent with functional mobility. Rosalie Barber PT 1/13/23

## 2023-01-13 NOTE — PT/OT/SLP EVAL
"Physical Therapy Evaluation/D/C Summary    Patient Name:  Marvin Pool   MRN:  [id number]    Recommendations:     Discharge Recommendations: home   Discharge Equipment Recommendations: none   Barriers to discharge: None    Assessment:     Marvin Pool is a 47 y.o. male admitted with a medical diagnosis of Brain tumor.  He presents with the following impairments/functional limitations: pain . PT D/Ricky due to pt independent with functional mobility. Pt educated in safety with mobility upon D/C, he expressed understanding.    Recent Surgery: Procedure(s) (LRB):  CRANIOTOMY, USING FRAMELESS STEREOTAXY (Left) 1 Day Post-Op    Plan:       (D/C PT due to pt able to perform functional mobility)   Plan of Care Expires:  02/12/23    Subjective   "I need to use the bathroom"    Pain/Comfort:  Pain Rating 1: 2/10  Location 1: head (top of head; incisional pain)  Pain Addressed 1: Cessation of Activity  Pain Rating Post-Intervention 1: 2/10    Patients cultural, spiritual, Jew conflicts given the current situation: no    Living Environment:  Pt lives with his wife and son in a 1 story home with no ZEYAD  Prior to admission, patients level of function was independent.  Equipment used at home: none.  Upon discharge, patient will have assistance from wife.    Objective:     Communicated with nurse prior to session.  Patient found up in chair with chair check, blood pressure cuff, pulse ox (continuous), telemetry  upon PT entry to room.    General Precautions: Standard, aspiration, fall  Orthopedic Precautions:N/A   Braces: N/A  Respiratory Status: Room air    Exams:  Cognitive Exam:  Patient is oriented to Person, Place, and Time  Sensation:    -       Intact  light/touch B LE  RLE ROM: WFL  RLE Strength: WFL  LLE ROM: WFL  LLE Strength: WFL    Functional Mobility:  Transfers:     Sit to Stand:  independence with no AD  Gait: 300ft then 20ft with no AD with supervision to manage lines. Pt performed standing balance " activities: high knee gait, single leg stance, and ambulated backwards with no instability noted.    AM-PAC 6 CLICK MOBILITY  Total Score:24     Treatment & Education:  Pt ambulated to the bathroom and urinated, nurse notified.    Patient left up in chair with all lines intact, call button in reach, chair alarm on, and nurse notified.    GOALS:   Multidisciplinary Problems       Physical Therapy Goals       Not on file              Multidisciplinary Problems (Resolved)          Problem: Physical Therapy    Goal Priority Disciplines Outcome Goal Variances Interventions   Physical Therapy Goal   (Resolved)     PT, PT/OT Met                         History:     Past Medical History:   Diagnosis Date    Brain tumor 1/12/2023    Seizures 1/12/2023       History reviewed. No pertinent surgical history.    Time Tracking:     PT Received On: 01/13/23  PT Start Time: 0802     PT Stop Time: 0826  PT Total Time (min): 24 min     Billable Minutes: Evaluation 14 and Gait Training 10      01/13/2023

## 2023-01-13 NOTE — DISCHARGE INSTRUCTIONS
Neurosurgery Patient Information  -Return to work will be determined on an individual basis.  -No driving until released by Dr. Park.  -Do not take any OTC products containing acetaminophen at the same time as you take your narcotic pain medication. Medications that may contain acetaminophen include but are not limited to: Excedrin and other headache medications, arthritis medications, cold and sinus medications, etc. Please review the list of active ingredients in any OTC medication prior to taking it.  -Do not take any Aspirin or Aspirin-containing products for 2 weeks after surgery.  -Do not take any Aleve, Naprosyn, Naproxen, Ibuprofen, Advil or any other nonsteroidal anti-inflammatory drug (NSAID) for 2 weeks after surgery.  -Do not take any herbal supplements for 2 weeks after surgery.   -Do not consume any alcoholic beverages until released by your neurosurgeon  -Do not perform any excessive bending over or leaning forward as this is a fall hazard.  -Do not perform any heavy lifting or lifting more than 5-10 lbs from the ground level as this is a fall hazard.  -Slowly increase your ambulation [walking] over the next 2 weeks as tolerated. The goal is to be walking 1-2 miles by the time of your 2 week post op appointment.   -Walk on paved surfaces only. It is okay to walk up and down stairs while holding onto a side rail.      Contact the Neurosurgery Office immediately if:  If you begin to notice any neurologic changes such as:           -Sudden onset of lethargy or sleepiness           -Sudden confusion, trouble speaking, or understanding            -Sudden trouble seeing in one or both eyes            -Sudden trouble walking, dizziness, loss of coordination            -Sudden severe headache with no known cause            -Sudden onset of numbness or weakness     Wound Care:  Keep your incision open to air. Keep incision clean and dry at all times. You may shower on the 2nd day after your surgery. You may  wash your hair with baby shampoo. Do not rub or scrub the incision. Do not allow the force of water to hit the incision. If the incision gets damp, gently pat it dry. You cannot take a bath/swim/submerge the incision until 8 weeks after surgery.    The incision does not need to be cleaned with any water, soap, alcohol, peroxide, or other substance.    Call your doctor or go to the Emergency Room for any signs of infection including: increased redness, drainage, pain or fever (temperature greater than or equal to 101.4).     Miscellaneous:  -You were started on a steroid (Decadron) taper while in the hospital. Please continue to take this medication as instructed until the course is completed.   -Please continue to take Protonix to protect your stomach while on a steroid. Once you have completed the steroid course, you can stop taking the Protonix.   -You were started on a medication to prevent seizures (Keppra) while in the hospital. Please continue to take this medication as instructed.   -Follow up with Dr. Park in 2 weeks for a wound check and pathology results. Appointment will be mailed to you.  Future Appointments   Date Time Provider Department Center   1/31/2023 11:30 AM Wellington Park MD [facility] NEUROSC [facility] Neurosurgery Office: [phone]

## 2023-01-13 NOTE — HOSPITAL COURSE
The patient presented to [facility] on 1/12/23 for left frontal craniotomy for tumor resection. The patient tolerated the procedure well and there were no intra-operative complications. After surgery, the patient was extubated and taken to Neuro ICU in stable condition.  Post-op MRI brain demonstrated expected post operative changes. PT/OT were consulted, the patient progressed, and was cleared to go home. Patient will be staying at the [facility].  On 1/13/23 he was discharged home with pain medication, decadron taper, keppra and follow up appointments. The patient was tolerating a regular diet and voiding without difficulty. Activity as tolerated. At the time of discharge, the patient was neurologically stable, was afebrile, and vital signs were stable. Discharge instructions were given verbally/written to the patient and their family, including wound care and follow-up appointments, and all of their questions were answered. The patient was also given a discharge instruction sheet explaining the aforementioned discussion.  The patient verbalized understanding of instructions and agreed to the plan. They were encouraged to call the clinic with any questions they might have prior to the follow up appointments.

## 2023-01-13 NOTE — PLAN OF CARE
POC reviewed with Marvin Pool and family at 1200. Pt and spouse verbalized understanding. Questions and concerns addressed. No acute events today. Pt progressing toward goals. Will continue to monitor. See below and flowsheets for full assessment and VS info.     - Pt cleared by PT/OT to do independent activities.   - Phosphorous being replaced.   - Per Dr. Park and team, pt ok to D/C home. Pt and spouse plan to stay at [facility] thorough Sunday and then go back home.   - Fentanyl and oxycodone given 1x each for headache.       Neuro:  Emeigh Coma Scale  Best Eye Response: 4-->(E4) spontaneous  Best Motor Response: 6-->(M6) obeys commands  Best Verbal Response: 5-->(V5) oriented  Emeigh Coma Scale Score: 15  Assessment Qualifiers: patient not sedated/intubated, no eye obstruction present  Pupil PERRLA: yes     24 hr Temp:  [97.6 °F (36.4 °C)-98.5 °F (36.9 °C)]     CV:   Rhythm: normal sinus rhythm  BP goals:   SBP < 160  MAP > 65    Resp:           Plan: N/A    GI/:     Diet/Nutrition Received: regular  Last Bowel Movement: 01/12/23       Intake/Output Summary (Last 24 hours) at 1/13/2023 1231  Last data filed at 1/13/2023 0901  Gross per 24 hour   Intake 1640 ml   Output 1950 ml   Net -310 ml     Unmeasured Output  Urine Occurrence: 1  Pad Count: 4 (from OR)    Labs/Accuchecks:  Recent Labs   Lab 01/13/23  0303   WBC 28.26*   RBC 4.64   HGB 12.5*   HCT 37.9*   *      Recent Labs   Lab 01/13/23  0303      K 4.1   CO2 20*      BUN 17   CREATININE 0.8   ALKPHOS 84   ALT 45*   AST 22   BILITOT 0.2    No results for input(s): PROTIME, INR, APTT, HEPANTIXA in the last 168 hours. No results for input(s): CPK, CPKMB, TROPONINI, MB in the last 168 hours.    Electrolytes: Electrolytes replaced  Accuchecks: ACHS    Gtts:      LDA/Wounds:  Lines/Drains/Airways       Peripheral Intravenous Line  Duration                  Peripheral IV - Single Lumen 01/12/23 0945 18 G Left;Posterior Hand 1  day         Peripheral IV - Single Lumen 01/12/23 1148 16 G Right Forearm 1 day                  Wounds: Yes  Wound care consulted: No    Is this a stroke patient? No.       Problem: Adult Inpatient Plan of Care  Goal: Plan of Care Review  Outcome: Adequate for Care Transition     Problem: Adult Inpatient Plan of Care  Goal: Patient-Specific Goal (Individualized)  Outcome: Adequate for Care Transition     Problem: Adult Inpatient Plan of Care  Goal: Absence of Hospital-Acquired Illness or Injury  Outcome: Adequate for Care Transition     Problem: Adult Inpatient Plan of Care  Goal: Optimal Comfort and Wellbeing  Outcome: Adequate for Care Transition     Problem: Adult Inpatient Plan of Care  Goal: Readiness for Transition of Care  Outcome: Adequate for Care Transition     Problem: Pain Acute  Goal: Acceptable Pain Control and Functional Ability  Outcome: Adequate for Care Transition

## 2023-01-13 NOTE — SUBJECTIVE & OBJECTIVE
Interval History:  No acute events overnight. Exam stable. Received PRN oxycodone and fentanyl for pain. Continue Decadron 4 q6h. Stable for stepdown to NSGY. SBP <160.     Review of Systems   Constitutional:  Negative for chills, fatigue and fever.   HENT:  Negative for trouble swallowing.    Eyes:  Negative for visual disturbance.   Respiratory:  Negative for shortness of breath.    Cardiovascular:  Negative for chest pain.   Gastrointestinal:  Negative for nausea and vomiting.   Genitourinary:  Negative for difficulty urinating.   Musculoskeletal:  Negative for back pain and neck pain.   Neurological:  Positive for headaches. Negative for dizziness, facial asymmetry, speech difficulty, weakness and numbness.   Psychiatric/Behavioral:  Negative for agitation and confusion.      Objective:     Vitals:  Temp: 97.8 °F (36.6 °C)  Pulse: 88  Rhythm: normal sinus rhythm  BP: 129/72  MAP (mmHg): 110  Resp: 17  SpO2: 99 %    Temp  Min: 97.6 °F (36.4 °C)  Max: 98.2 °F (36.8 °C)  Pulse  Min: 74  Max: 95  BP  Min: 102/57  Max: 157/88  MAP (mmHg)  Min: 77  Max: 115  Resp  Min: 15  Max: 26  SpO2  Min: 93 %  Max: 100 %    01/12 0701 - 01/13 0700  In: 2440 [P.O.:440; I.V.:1500]  Out: 1950 [Urine:1950]   Unmeasured Output  Urine Occurrence: 1  Pad Count: 4 (from OR)       Physical Exam  Vitals and nursing note reviewed.   Constitutional:       General: He is not in acute distress.     Appearance: Normal appearance.      Comments: Well developed. Well nourished. Resting comfortably in bed.   HENT:      Head: Normocephalic.      Comments: Dressing in place at surgical site. C/D/I. No discharge, erythema, or warmth noted.      Right Ear: External ear normal.      Left Ear: External ear normal.      Nose: Nose normal.      Mouth/Throat:      Mouth: Mucous membranes are moist.      Pharynx: Oropharynx is clear.   Eyes:      Extraocular Movements: Extraocular movements intact.      Pupils: Pupils are equal, round, and reactive to  light.   Cardiovascular:      Rate and Rhythm: Normal rate and regular rhythm.      Pulses: Normal pulses.      Heart sounds: Normal heart sounds.   Pulmonary:      Effort: Pulmonary effort is normal. No respiratory distress.   Abdominal:      General: Abdomen is flat. There is no distension.   Musculoskeletal:      Right lower leg: No edema.      Left lower leg: No edema.   Skin:     General: Skin is warm and dry.   Neurological:      Mental Status: He is alert.      Comments: E4V5M6  AA&Ox4. Speech fluent. Answers questions appropriately. Follows commands briskly.  Visual fields full to confrontation bilaterally. PERRL. EOMI. No facial asymmetry. Conversational hearing intact. Shoulder shrug intact.   Moves all extremities spontaneously, symmetrically, & against gravity. SILT in all 4 extremities. No pronator drift.          Medications:  Continuous ScheduleddexAMETHasone, 4 mg, Q6H  famotidine, 20 mg, BID  fluconazole, ,   gabapentin, 300 mg, TID  heparin (porcine), 5,000 Units, Q8H  levETIRAcetam, 500 mg, BID    PRNacetaminophen, 650 mg, Q6H PRN  clonazePAM, 0.5 mg, BID PRN  dextrose 10%, 12.5 g, PRN  dextrose 10%, 25 g, PRN  fentaNYL, 25 mcg, Q4H PRN  glucagon (human recombinant), 1 mg, PRN  glucose, 16 g, PRN  glucose, 24 g, PRN  hydrALAZINE, 10 mg, Q6H PRN  insulin aspart U-100, 0-5 Units, QID (AC + HS) PRN  labetalol, 10 mg, Q4H PRN  melatonin, 6 mg, Nightly PRN  ondansetron, 4 mg, Q6H PRN  oxyCODONE, 5 mg, Q6H PRN      Today I personally reviewed pertinent medications, imaging, cardiology results, laboratory results, microbiology results, notably:    Diet  Diet Adult Regular (IDDSI Level 7)  Diet Adult Regular (IDDSI Level 7)

## 2023-01-13 NOTE — ASSESSMENT & PLAN NOTE
47 y.o. male with history of left frontal brain mass admitted s/p left frontal craniotomy with tumor resection      - Stable for stepdown and continue to be followed by NSGY  - NSGY following  - q1h neuro checks, vitals, I/Os  - Daily CBC, CMP, mag, phos  - SBP < 160  - PRN labetalol, hydralazine  - PRN pain, nausea medications  - Post-op MRI pending  - Dex 4mg q6h   - Keppra 500mg BID  - HOB restriction  - Seizure precautions  - Regular diet  - PT/OT/SLP as appropriate  - VTE prophylaxis: mechanical, hold chemical in the acute setting

## 2023-01-13 NOTE — SUBJECTIVE & OBJECTIVE
History reviewed. No pertinent past medical history.  History reviewed. No pertinent surgical history.   No current facility-administered medications on file prior to encounter.     Current Outpatient Medications on File Prior to Encounter   Medication Sig Dispense Refill    acetaminophen (TYLENOL) 500 MG tablet Take 1,000 mg by mouth every 6 (six) hours as needed for Pain.      KLONOPIN 0.5 mg tablet Take 0.5 mg by mouth daily as needed.      omeprazole (PRILOSEC) 40 MG capsule Take 40 mg by mouth every morning.      zolpidem (AMBIEN) 10 mg Tab Take 10 mg by mouth nightly as needed.      [DISCONTINUED] LEVETIRACETAM ORAL Take by mouth 2 (two) times daily.      levETIRAcetam (KEPPRA) 500 MG Tab Take 500 mg by mouth 2 (two) times daily.      traZODone (DESYREL) 50 MG tablet Take 50 mg by mouth every evening.        Allergies: Patient has no known allergies.    History reviewed. No pertinent family history.     Review of Systems   Constitutional:  Negative for chills, fatigue and fever.   HENT:  Negative for trouble swallowing.    Eyes:  Negative for visual disturbance.   Respiratory:  Negative for shortness of breath.    Cardiovascular:  Negative for chest pain.   Gastrointestinal:  Negative for nausea and vomiting.   Genitourinary:  Negative for difficulty urinating.   Musculoskeletal:  Negative for back pain and neck pain.   Neurological:  Positive for headaches. Negative for dizziness, facial asymmetry, speech difficulty, weakness and numbness.   Psychiatric/Behavioral:  Negative for agitation and confusion.    Objective:     Vitals:    Temp: 98.5 °F (36.9 °C)  Pulse: 78  Rhythm: normal sinus rhythm  BP: 114/64  MAP (mmHg): 83  Resp: (!) 23  SpO2: 97 %    Temp  Min: 98.3 °F (36.8 °C)  Max: 98.5 °F (36.9 °C)  Pulse  Min: 77  Max: 89  BP  Min: 98/59  Max: 128/67  MAP (mmHg)  Min: 71  Max: 90  Resp  Min: 12  Max: 28  SpO2  Min: 93 %  Max: 100 %    No intake/output data recorded.   Unmeasured Output  Urine Occurrence:  1  Pad Count: 4       Physical Exam  Vitals and nursing note reviewed.   Constitutional:       General: He is not in acute distress.     Appearance: Normal appearance.      Comments: Well developed. Well nourished. Resting comfortably in bed.   HENT:      Head: Normocephalic.      Comments: Dressing in place at surgical site. C/D/I. No discharge, erythema, or warmth noted.      Right Ear: External ear normal.      Left Ear: External ear normal.      Nose: Nose normal.      Mouth/Throat:      Mouth: Mucous membranes are moist.      Pharynx: Oropharynx is clear.   Eyes:      Extraocular Movements: Extraocular movements intact.      Pupils: Pupils are equal, round, and reactive to light.   Cardiovascular:      Rate and Rhythm: Normal rate and regular rhythm.      Pulses: Normal pulses.      Heart sounds: Normal heart sounds.   Pulmonary:      Effort: Pulmonary effort is normal. No respiratory distress.   Abdominal:      General: Abdomen is flat. There is no distension.   Musculoskeletal:      Right lower leg: No edema.      Left lower leg: No edema.   Skin:     General: Skin is warm and dry.   Neurological:      Mental Status: He is alert.      Comments: E4V5M6  AA&Ox4. Speech fluent. Answers questions appropriately. Follows commands briskly.  Visual fields full to confrontation bilaterally. PERRL. EOMI. No facial asymmetry. Conversational hearing intact. Shoulder shrug intact.   Moves all extremities spontaneously, symmetrically, & against gravity. SILT in all 4 extremities. No pronator drift.      Gait & coordination exams deferred.     Today I personally reviewed pertinent medications, lines/drains/airways, imaging,.

## 2023-01-13 NOTE — ADDENDUM NOTE
Addendum  created 01/12/23 1804 by Da Cope MD    Attestation recorded in Intraprocedure, Intraprocedure Attestations filed

## 2023-01-13 NOTE — HPI
This patient is a very pleasant 47-year-old gentleman who presented to another hospital after having a seizure.  According to the patient and his wife, the patient had a seizure in his sleep.  He then had another seizure while out at lunch.  He was seen the local hospital in [place], [place] where he had a workup that included an MRI brain showing a left frontal brain tumor.  During that visit, he was seen by a local neurosurgeon.  That surgeon agreed with the diagnosis and recommended a higher level of care.  MRI showed a large non-enhancing mass involving the left frontal lobe.  Functional MRI confirming left hemispheric language dominance, with dorsal lateral prefrontal cortex and language SMA in close proximity to the lesion.  The patient wished to proceed with surgery.  We performed preoperative mapping using the functional MRI and DTI using [employer] software.  This tumor involved a small part of the motor cortex and the premotor cortex on the left.  It also a but it broke his area.  Given these findings we planned for a limited resection.  The risks, benefits, and alternatives were discussed with the patient's family and they wished proceed.

## 2023-01-13 NOTE — PLAN OF CARE
[facility] Care Plan    POC reviewed with Marvin Pool and family at 1400. Pt verbalized understanding. Questions and concerns addressed. No acute events today. Pt progressing toward goals. Will continue to monitor. See below and flowsheets for full assessment and VS info. Pt doing very well post crani, slight headache, neuro intact, plan MRI for later tonight.            Is this a stroke patient? no    Neuro:  Maninder Coma Scale  Best Eye Response: 4-->(E4) spontaneous  Best Motor Response: 6-->(M6) obeys commands  Best Verbal Response: 5-->(V5) oriented  Maninder Coma Scale Score: 15  Assessment Qualifiers: patient not sedated/intubated  Pupil PERRLA: yes     24 hr Temp:  [98.2 °F (36.8 °C)-98.5 °F (36.9 °C)]     CV:   Rhythm: normal sinus rhythm  BP goals:   SBP < 160  MAP > 65    Resp:           Plan: N/A    GI/:     Diet/Nutrition Received: regular          Intake/Output Summary (Last 24 hours) at 1/12/2023 1930  Last data filed at 1/12/2023 1752  Gross per 24 hour   Intake 2340 ml   Output 800 ml   Net 1540 ml     Unmeasured Output  Urine Occurrence: 1  Pad Count: 4 (from OR)    Labs/Accuchecks:  No results for input(s): WBC, RBC, HGB, HCT, PLT in the last 168 hours. No results for input(s): NA, K, CO2, CL, BUN, CREATININE, ALKPHOS, ALT, AST, BILITOT in the last 168 hours.    Invalid input(s): 9790947 No results for input(s): PROTIME, INR, APTT, HEPANTIXA in the last 168 hours. No results for input(s): CPK, CPKMB, TROPONINI, MB in the last 168 hours.    Electrolytes: No replacement orders  Accuchecks: q 6     Gtts:      LDA/Wounds:  Lines/Drains/Airways       Arterial Line  Duration             Arterial Line 01/12/23 1210 Right Radial <1 day              Peripheral Intravenous Line  Duration                  Peripheral IV - Single Lumen 01/12/23 0945 18 G Left;Posterior Hand <1 day         Peripheral IV - Single Lumen 01/12/23 1148 16 G Right Forearm <1 day                  Wounds: Yes  Wound care consulted:  No

## 2023-01-13 NOTE — PLAN OF CARE
01/13/23 1136   Post-Acute Status   Post-Acute Authorization Other   Other Status No Post-Acute Service Needs       Radha Palacios LCSW  Neurocritical Care   [facility]  [id number]

## 2023-01-13 NOTE — NURSING
RN reviewed D/C paperwork with patient and spouse. PIVs removed, pt dressed. All questions addressed, no further questions at this time. PT was transported to [facility] by wheelchair and [facility] primary RN.

## 2023-01-16 LAB
BLD PROD TYP BPU: NORMAL
BLOOD UNIT EXPIRATION DATE: NORMAL
BLOOD UNIT TYPE CODE: 5100
BLOOD UNIT TYPE CODE: 5100
BLOOD UNIT TYPE CODE: 9500
BLOOD UNIT TYPE CODE: 9500
BLOOD UNIT TYPE: NORMAL
CODING SYSTEM: NORMAL
DISPENSE STATUS: NORMAL
NUM UNITS TRANS PACKED RBC: NORMAL

## 2023-01-17 ENCOUNTER — PATIENT OUTREACH (OUTPATIENT)
Dept: ADMINISTRATIVE | Facility: CLINIC | Age: 48
End: 2023-01-17
Payer: COMMERCIAL

## 2023-01-17 NOTE — PROGRESS NOTES
C3 nurse spoke with Marvin Pool for a TCC post hospital discharge follow up call. The patient reports does not have a scheduled HOSFU appointment. C3 nurse was unable to schedule HOSFU appointment for [facility] PCP. Patient advised to contact their PCP to schedule a HOSPFU within 5-7 days.

## 2023-01-20 ENCOUNTER — PATIENT MESSAGE (OUTPATIENT)
Dept: NEUROSURGERY | Facility: CLINIC | Age: 48
End: 2023-01-20
Payer: COMMERCIAL

## 2023-01-27 ENCOUNTER — TELEPHONE (OUTPATIENT)
Dept: HEMATOLOGY/ONCOLOGY | Facility: CLINIC | Age: 48
End: 2023-01-27
Payer: COMMERCIAL

## 2023-01-27 DIAGNOSIS — C71.9 GLIOMA: Primary | ICD-10-CM

## 2023-01-30 NOTE — ADDENDUM NOTE
Addendum  created 01/30/23 1725 by Da Cope MD    Clinical Note Signed, Diagnosis association updated, Intraprocedure Blocks edited

## 2023-01-31 ENCOUNTER — OFFICE VISIT (OUTPATIENT)
Dept: NEUROSURGERY | Facility: CLINIC | Age: 48
End: 2023-01-31
Payer: COMMERCIAL

## 2023-01-31 ENCOUNTER — TUMOR BOARD CONFERENCE (OUTPATIENT)
Dept: NEUROSURGERY | Facility: CLINIC | Age: 48
End: 2023-01-31
Payer: COMMERCIAL

## 2023-01-31 VITALS — TEMPERATURE: 98 F | DIASTOLIC BLOOD PRESSURE: 75 MMHG | HEART RATE: 60 BPM | SYSTOLIC BLOOD PRESSURE: 118 MMHG

## 2023-01-31 VITALS
DIASTOLIC BLOOD PRESSURE: 75 MMHG | SYSTOLIC BLOOD PRESSURE: 118 MMHG | HEIGHT: 70 IN | BODY MASS INDEX: 24.02 KG/M2 | WEIGHT: 167.75 LBS | HEART RATE: 60 BPM

## 2023-01-31 DIAGNOSIS — C71.9 GLIOMA: Primary | ICD-10-CM

## 2023-01-31 DIAGNOSIS — R56.9 SEIZURES: Chronic | ICD-10-CM

## 2023-01-31 DIAGNOSIS — D49.6 BRAIN TUMOR: ICD-10-CM

## 2023-01-31 PROCEDURE — 3074F PR MOST RECENT SYSTOLIC BLOOD PRESSURE < 130 MM HG: ICD-10-PCS | Mod: CPTII,S$GLB,, | Performed by: NEUROLOGICAL SURGERY

## 2023-01-31 PROCEDURE — 3078F PR MOST RECENT DIASTOLIC BLOOD PRESSURE < 80 MM HG: ICD-10-PCS | Mod: CPTII,S$GLB,, | Performed by: NEUROLOGICAL SURGERY

## 2023-01-31 PROCEDURE — 3008F BODY MASS INDEX DOCD: CPT | Mod: CPTII,S$GLB,, | Performed by: PSYCHIATRY & NEUROLOGY

## 2023-01-31 PROCEDURE — 3074F SYST BP LT 130 MM HG: CPT | Mod: CPTII,S$GLB,, | Performed by: PSYCHIATRY & NEUROLOGY

## 2023-01-31 PROCEDURE — 99999 PR PBB SHADOW E&M-EST. PATIENT-LVL III: CPT | Mod: PBBFAC,,, | Performed by: PSYCHIATRY & NEUROLOGY

## 2023-01-31 PROCEDURE — 99999 PR PBB SHADOW E&M-EST. PATIENT-LVL III: ICD-10-PCS | Mod: PBBFAC,,, | Performed by: PSYCHIATRY & NEUROLOGY

## 2023-01-31 PROCEDURE — 1159F PR MEDICATION LIST DOCUMENTED IN MEDICAL RECORD: ICD-10-PCS | Mod: CPTII,S$GLB,, | Performed by: NEUROLOGICAL SURGERY

## 2023-01-31 PROCEDURE — 1159F MED LIST DOCD IN RCRD: CPT | Mod: CPTII,S$GLB,, | Performed by: PSYCHIATRY & NEUROLOGY

## 2023-01-31 PROCEDURE — 99024 POSTOP FOLLOW-UP VISIT: CPT | Mod: S$GLB,,, | Performed by: NEUROLOGICAL SURGERY

## 2023-01-31 PROCEDURE — 1159F PR MEDICATION LIST DOCUMENTED IN MEDICAL RECORD: ICD-10-PCS | Mod: CPTII,S$GLB,, | Performed by: PSYCHIATRY & NEUROLOGY

## 2023-01-31 PROCEDURE — 3074F PR MOST RECENT SYSTOLIC BLOOD PRESSURE < 130 MM HG: ICD-10-PCS | Mod: CPTII,S$GLB,, | Performed by: PSYCHIATRY & NEUROLOGY

## 2023-01-31 PROCEDURE — 99999 PR PBB SHADOW E&M-EST. PATIENT-LVL III: ICD-10-PCS | Mod: PBBFAC,,, | Performed by: NEUROLOGICAL SURGERY

## 2023-01-31 PROCEDURE — 3078F DIAST BP <80 MM HG: CPT | Mod: CPTII,S$GLB,, | Performed by: NEUROLOGICAL SURGERY

## 2023-01-31 PROCEDURE — 1160F PR REVIEW ALL MEDS BY PRESCRIBER/CLIN PHARMACIST DOCUMENTED: ICD-10-PCS | Mod: CPTII,S$GLB,, | Performed by: NEUROLOGICAL SURGERY

## 2023-01-31 PROCEDURE — 3008F PR BODY MASS INDEX (BMI) DOCUMENTED: ICD-10-PCS | Mod: CPTII,S$GLB,, | Performed by: PSYCHIATRY & NEUROLOGY

## 2023-01-31 PROCEDURE — 3078F PR MOST RECENT DIASTOLIC BLOOD PRESSURE < 80 MM HG: ICD-10-PCS | Mod: CPTII,S$GLB,, | Performed by: PSYCHIATRY & NEUROLOGY

## 2023-01-31 PROCEDURE — 99215 OFFICE O/P EST HI 40 MIN: CPT | Mod: S$GLB,,, | Performed by: PSYCHIATRY & NEUROLOGY

## 2023-01-31 PROCEDURE — 1160F RVW MEDS BY RX/DR IN RCRD: CPT | Mod: CPTII,S$GLB,, | Performed by: NEUROLOGICAL SURGERY

## 2023-01-31 PROCEDURE — 99024 PR POST-OP FOLLOW-UP VISIT: ICD-10-PCS | Mod: S$GLB,,, | Performed by: NEUROLOGICAL SURGERY

## 2023-01-31 PROCEDURE — 3074F SYST BP LT 130 MM HG: CPT | Mod: CPTII,S$GLB,, | Performed by: NEUROLOGICAL SURGERY

## 2023-01-31 PROCEDURE — 1159F MED LIST DOCD IN RCRD: CPT | Mod: CPTII,S$GLB,, | Performed by: NEUROLOGICAL SURGERY

## 2023-01-31 PROCEDURE — 1111F PR DISCHARGE MEDS RECONCILED W/ CURRENT OUTPATIENT MED LIST: ICD-10-PCS | Mod: CPTII,S$GLB,, | Performed by: PSYCHIATRY & NEUROLOGY

## 2023-01-31 PROCEDURE — 3078F DIAST BP <80 MM HG: CPT | Mod: CPTII,S$GLB,, | Performed by: PSYCHIATRY & NEUROLOGY

## 2023-01-31 PROCEDURE — 1111F DSCHRG MED/CURRENT MED MERGE: CPT | Mod: CPTII,S$GLB,, | Performed by: PSYCHIATRY & NEUROLOGY

## 2023-01-31 PROCEDURE — 99215 PR OFFICE/OUTPT VISIT, EST, LEVL V, 40-54 MIN: ICD-10-PCS | Mod: S$GLB,,, | Performed by: PSYCHIATRY & NEUROLOGY

## 2023-01-31 PROCEDURE — 99999 PR PBB SHADOW E&M-EST. PATIENT-LVL III: CPT | Mod: PBBFAC,,, | Performed by: NEUROLOGICAL SURGERY

## 2023-01-31 RX ORDER — TEMOZOLOMIDE 5 MG/1
5 CAPSULE ORAL DAILY
Qty: 42 CAPSULE | Refills: 0 | Status: SHIPPED | OUTPATIENT
Start: 2023-01-31 | End: 2023-03-14

## 2023-01-31 RX ORDER — TEMOZOLOMIDE 140 MG/1
140 CAPSULE ORAL DAILY
Qty: 42 CAPSULE | Refills: 0 | Status: SHIPPED | OUTPATIENT
Start: 2023-01-31 | End: 2023-07-31 | Stop reason: SDUPTHER

## 2023-01-31 NOTE — PATIENT INSTRUCTIONS
I have personally reviewed the MRI brain with the pt which shows expected immediate postsurgical changes of left frontal craniotomy for frontal mass resection. Persistent area of T2 FLAIR hyperintense signal surrounding the resection is similar size compared to preoperative MRI. Typical thin peripheral enhancement around the resection cavity.     I have reviewed the pathology report with the patient which is positive for low grade glioma. This patient's relevant clinical data was reviewed before the multidisciplinary tumor board in order to establish an optimum treatment plan for this pt.    I recommend chemoRT. I will refer the pt to Dr. Roman, medical oncology.    I will schedule the patient for 3 month follow up with MRI brain.

## 2023-01-31 NOTE — PROGRESS NOTES
Subjective:   I, Bonnie Ly, attest that this documentation has been prepared under the direction and in the presence of Wellington Park MD.     Patient ID: Marvin Pool is a 47 y.o. male     Chief Complaint: No chief complaint on file.      HPI  Mr. Marvin Pool is a 47 y.o. gentleman with who presents today for 2 week post operative follow up of left frontal craniotomy for tumor done on 1/12/2023. This is a patient who presented to another hospital after having a seizure.  According to the patient and his wife, the patient had a seizure in his sleep.  He then had another seizure while out at lunch.  He was seen the local hospital in [place], [place] where he had a workup that included an MRI brain showing a left frontal brain tumor.  During that visit, he was seen by a local neurosurgeon.  That surgeon agreed with the diagnosis and recommended a higher level of care.  MRI showed a large non-enhancing mass involving the left frontal lobe.  Functional MRI confirming left hemispheric language dominance, with dorsal lateral prefrontal cortex and language SMA in close proximity to the lesion.  The patient wished to proceed with surgery.  We performed preoperative mapping using the functional MRI and DTI using [employer] software.  This tumor involved a small part of the motor cortex and the premotor cortex on the left.  It also a but it broke his area.  Given these findings we planned for a limited resection.  The risks, benefits, and alternatives were discussed with the patient's family and they wished proceed.    Today the pt reports he is doing well in the interim. He is active at baseline and maintains his energy levels daily. He reports no seizures.     Review of Systems   Constitutional:  Negative for activity change, appetite change, fatigue, fever and unexpected weight change.   HENT:  Negative for facial swelling.    Eyes: Negative.    Respiratory: Negative.     Cardiovascular: Negative.     Gastrointestinal:  Negative for diarrhea, nausea and vomiting.   Endocrine: Negative.    Genitourinary: Negative.    Musculoskeletal:  Negative for back pain, joint swelling, myalgias and neck pain.   Neurological:  Negative for dizziness, seizures, weakness, numbness and headaches.   Psychiatric/Behavioral: Negative.        Past Medical History:   Diagnosis Date    Brain tumor 1/12/2023    Seizures 1/12/2023       Objective:      Vitals:    01/31/23 1041   BP: 118/75   Pulse: 60   Temp: 97.9 °F (36.6 °C)      Physical Exam  Constitutional:       General: He is not in acute distress.     Appearance: Normal appearance.   HENT:      Head: Normocephalic and atraumatic.   Pulmonary:      Effort: Pulmonary effort is normal.   Musculoskeletal:      Cervical back: Neck supple.   Neurological:      Mental Status: He is alert and oriented to person, place, and time.      GCS: GCS eye subscore is 4. GCS verbal subscore is 5. GCS motor subscore is 6.      Cranial Nerves: No cranial nerve deficit.      Comments: Incision site is dry, clean, and intact.     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 Persistent area of T2 FLAIR hyperintense signal surrounding the resection is similar size compared to preoperative MRI. Typical thin peripheral enhancement around the resection cavity.    I have personally reviewed the images with the pt.      I, Dr. Wellington Park, personally performed the services described in this documentation. All medical record entries made by the scribe, Bonnie Ly, were at my direction and in my presence.  I have reviewed the chart and agree that the record reflects my personal performance and is accurate and complete. Wellington Park MD. 01/31/2023    Assessment:       Low grade  glioma.     Plan:   I have personally reviewed the MRI brain with the pt which shows expected immediate postsurgical changes of left frontal craniotomy for frontal mass resection. Persistent area of T2 FLAIR hyperintense signal surrounding the resection is similar size compared to preoperative MRI. Typical thin peripheral enhancement around the resection cavity.    I have reviewed the pathology report with the patient which is positive for low grade glioma. This patient's relevant clinical data was reviewed before the multidisciplinary tumor board in order to establish an optimum treatment plan for this pt.    I recommend chemoRT. I will refer the pt to Dr. Roman, medical oncology.    I will schedule the patient for 3 month follow up with MRI brain.   Alert-The patient is alert, awake and responds to voice. The patient is oriented to time, place, and person. The triage nurse is able to obtain subjective information.

## 2023-01-31 NOTE — PROGRESS NOTES
[facility]   NEURO-ONCOLOGICAL MULTIDISCIPLINARY TUMOR BOARD  PATIENT REVIEW FORM  ____________________________________________________________  Bonnie TRAN, attest that this documentation has been prepared under the direction and in the presence of Wellington Park MD.     PATIENT ID: Marvin Pool is a 47 y.o. male  DATE: 1/31/2023    This patient's relevant clinical data was reviewed before the multidisciplinary tumor board in order to establish an optimum treatment plan in this patient. The patient's clinical data were presented at our Multi-Disciplinary Tumor Board which included representatives of Neurosurgery, Neuro-Radiology, Neuro-Pathology, Radiation Oncology, Medical Oncology, Palliative Medicine.    PRESENTER:   Dr. Park    PATIENT SUMMARY:   The patient is a 47 y.o. male  who went to OSH for seizures. MRI brain showing a left frontal brain tumor. Patient under craniotomy for partial tumor resection on 1/12/2023.    Additionally, we reviewed previous medical and familial history of present illness, and recent lab results along with all available histopathologic and imaging studies.    IMAGING:   MRI Brain W WO Contrast (1/12/2023):  Expected immediate postsurgical changes of left frontal craniotomy for frontal mass resection. Persistent area of T2 FLAIR hyperintense signal surrounding the resection is similar size compared to preoperative MRI. Typical thin peripheral enhancement around the resection cavity.    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 Pt was recommended chemoRT for residual tumor. He  will be following up with Dr. Fontaine, radiation oncology, on 2/7/2023.    National site-specific guidelines were discussed with respect to the case.     Tumor board is a meeting of clinicians from various specialty areas who evaluate and discuss patients for whom a multidisciplinary approach is being considered. Final determinations in the plan of care are those of the provider(s). The responsibility for follow up recommendations given during tumor board is that of the provider.     CONSULT NEEDED:     [] Neurosurgery    [] Hem/Onc    [] Rad/Onc         [] Endocrinology     [] Neuro-ophthalmology    [] Palliative Medicine      []  Other:       PRESENTATION AT CANCER CONFERENCE:         [] Prospective    [x] Retrospective     [] Follow-Up          [] Eligible for clinical trial/Clinical trial status:

## 2023-01-31 NOTE — PATIENT INSTRUCTIONS
Message me with your radiation start date when you know it so that we can schedule appropriate follow up appointments.    Do not start your chemotherapy until the night prior to your first radiation treatment.    Your temozolomide dose is 145mg nightly. This will be made up of one 140mg pill and one 5mg pill.    Pre-treat with ondansetron 8mg 30-45 min prior to chemotherapy dose. Take any regular nighttime medications with the ondansetron. Take chemotherapy just prior to bedtime. Do not eat for about 2 hours prior to taking the chemotherapy.    Start Docusate daily starting 2-3 days prior to chemotherapy and laxatives every 2-3 days as needed      Continue current levetiracetam (Keppra) dose.

## 2023-01-31 NOTE — PROGRESS NOTES
Subjective:       Patient ID: Marvin Pool is a 47 y.o. male.    Chief Complaint: Brain tumor    HPI  Oncologic History  2020: In restrospect, started taking BC powder/having comprehension/speech issues  1/2023: had a seizure in his sleep; when recalling it to family, thought it was sleep paralysis. While out to lunch shortly after with family, had a seizure at the restaurant, then another witnessed by EMS. MRI brain at [facility] showed left frontal mass. [place] [facility] [place].  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 He feels his thoughts are clearer than before surgery. No additional seizures. Has some irritability but unsure if from Keppra or processing his recent diagnosis. Has weaned off dexamethasone without issue.    Presents today with his wife, April.    Past Medical History:   Diagnosis Date    Brain tumor 1/12/2023    Seizures 1/12/2023      Current Outpatient Medications   Medication Sig Dispense Refill    acetaminophen (TYLENOL) 500 MG tablet Take 1,000 mg by mouth every 6 (six) hours as needed for Pain.      dexAMETHasone (DECADRON) 2 MG tablet Take 2 tablets (4mg) every 6 hours for 3 days, then take 2 tablet (4mg) every 8 hours for 3 days, then take 2 tablets (4mg) every 12 hours for 3 days, then take 1 tablet (2mg) every 8 hours for 3 days, then take 1 tablet (2mg) every 12 hours for 3 days, then take 1 tablet (2mg) daily for 6 days, then OFF.Tablets: 75 75 tablet 0    KLONOPIN 0.5 mg tablet Take 0.5 mg by mouth daily as needed.      levETIRAcetam (KEPPRA) 500 MG Tab Take 500 mg by mouth 2 (two) times daily.      omeprazole (PRILOSEC) 40 MG capsule Take 40 mg by mouth every morning.      oxyCODONE-acetaminophen (PERCOCET) 5-325 mg per tablet Take 1 tablet by mouth every 4 (four) hours as needed for Pain. 56 tablet 0    traZODone (DESYREL) 50 MG  tablet Take 50 mg by mouth every evening.      zolpidem (AMBIEN) 10 mg Tab Take 10 mg by mouth nightly as needed.      temozolomide (TEMODAR) 140 MG capsule Take 1 capsule (140 mg total) by mouth once daily Take as directed days 1-42 (6 weeks). Take on an empty stomach. with 1 other temozolomide prescription for 145 mg total. 42 capsule 0    temozolomide (TEMODAR) 5 MG capsule Take 1 capsule (5 mg total) by mouth once daily Take as directed days 1-42 (6 weeks). Take on an empty stomach. with 1 other temozolomide prescription for 145 mg total. 42 capsule 0     No current facility-administered medications for this visit.      Past Surgical History:   Procedure Laterality Date    CRANIOTOMY USING FRAMELESS STEREOTAXY Left 1/12/2023    Procedure: CRANIOTOMY, USING FRAMELESS STEREOTAXY;  Surgeon: Wellington Park MD;  Location: [facility] OR [address] [place] [address];  Service: Neurosurgery;  Laterality: Left;      No family history on file.       Review of Systems   Constitutional:  Negative for unexpected weight change.   Eyes:  Negative for visual disturbance.   Respiratory:  Negative for shortness of breath.    Cardiovascular:  Negative for leg swelling.   Gastrointestinal:  Negative for abdominal distention.   Musculoskeletal:  Negative for gait problem.   Neurological:  Negative for seizures and weakness.   Psychiatric/Behavioral:  Positive for agitation and sleep disturbance.    KPS: 100      Objective:      Vitals:    01/31/23 1328   BP: 118/75   Pulse: 60        NEUROLOGICAL EXAMINATION:     MENTAL STATUS   Attention: normal. Concentration: normal.   Speech: speech is normal   Level of consciousness: alert  Knowledge: good.     CRANIAL NERVES   Cranial nerves II through XII intact.     MOTOR EXAM     Strength   Strength 5/5 throughout.     REFLEXES     Reflexes   Right brachioradialis: 2+  Left brachioradialis: 2+  Right biceps: 2+  Left biceps: 2+  Right patellar: 2+  Left patellar: 2+  Right achilles: 2+  Left achilles: 2+    SENSORY  EXAM   Light touch normal.     GAIT AND COORDINATION     Gait  Gait: normal     Coordination   Finger to nose coordination: normal     MRI brain from 1/12/2023 was personally visualized and compared to prior. This study shows expected post-operative changes from STR.    Assessment:       Problem List Items Addressed This Visit          Neuro    Seizures (Chronic)       Oncology    Brain tumor (Chronic)    Relevant Medications    temozolomide (TEMODAR) 5 MG capsule    temozolomide (TEMODAR) 140 MG capsule    Other Relevant Orders    Physician communication order    Physician communication order    Physician communication order    Glioma - Primary    Relevant Medications    temozolomide (TEMODAR) 5 MG capsule    temozolomide (TEMODAR) 140 MG capsule    Other Relevant Orders    Physician communication order    Physician communication order    Physician communication order         Plan:       Marvin Pool is a 47 y.o. male with a low-grade glioma (WHO grade 2, 1p/19q status pending) s/p STR 1/12/2023 presenting for treatment recommendations. Tumor diagnosis was heralded by seizures. Current symptoms include none.    Low-grade glioma  Today we discussed this diagnosis at length and that the final diagnosis will either be astrocytoma or oligodendroglioma, WHO grade 2, depending on 1p/19q co-deletion status. We discussed that upfront treatment is recommended due to his age, subtotal resection, and presumed diffuse nature of disease. We discussed that treatment in general consists of maximum surgical resection followed by radiation and chemotherapy. He meets with Dr. Milan Fontaine next week for a discussion re: radiation. From a chemotherapy standpoint, we discussed two options for treatment: PCV vs TMZ. We discussed the administration, monitoring, and adverse effects of both. He elected to proceed with TMZ. Informed, signed consent was obtained and scanned into the chart.     Prescribed TMZ with instructions:  Do not  start TMZ until the night prior to radiation. Your temozolomide dose is 145mg nightly. This will be made up of one 140mg pill and one 5mg pill.    Pre-treat with ondansetron 8mg 30-45 min prior to chemotherapy dose. Take any regular nighttime medications with the ondansetron. Take chemotherapy just prior to bedtime. Do not eat for about 2 hours prior to taking the chemotherapy.    Start Docusate daily starting 2-3 days prior to chemotherapy and laxatives every 2-3 days as needed.    Weekly CBC and q3 week CMP have been ordered for completion during chemoradiation.    Seizures  Continue keppra. May consider switching to breviracetam or lacosamide if irritability persists.      60 minutes of total time spent on the encounter, which includes face to face time and non-face to face time preparing to see the patient (eg, review of tests), Obtaining and/or reviewing separately obtained history, Documenting clinical information in the electronic or other health record, Independently interpreting results (not separately reported) and communicating results to the patient/family/caregiver, or Care coordination (not separately reported).     Alyse Roman MD  Neuro-Oncology, Movement Disorders

## 2023-01-31 NOTE — ADDENDUM NOTE
Addendum  created 01/31/23 0715 by Da Cope MD    Clinical Note Signed, Diagnosis association updated, Intraprocedure Blocks edited, SmartForm saved

## 2023-02-02 ENCOUNTER — SPECIALTY PHARMACY (OUTPATIENT)
Dept: PHARMACY | Facility: CLINIC | Age: 48
End: 2023-02-02
Payer: COMMERCIAL

## 2023-02-02 NOTE — TELEPHONE ENCOUNTER
"Asad, this is Maciej Almanza with [facility].  We are working on your prescription that your doctor has sent us. We will be working with your insurance to get this approved for you. We will be calling you along the way with updates on your medication.  If you have any questions, you can reach us at ([phone])    Welcome call outcome: Patient/caregiver reached    Test claim: "NON-PARTICIPATING PHARMACY" error. Will f/up with insurance.   "

## 2023-02-03 ENCOUNTER — TELEPHONE (OUTPATIENT)
Dept: NEUROSURGERY | Facility: CLINIC | Age: 48
End: 2023-02-03
Payer: COMMERCIAL

## 2023-02-03 NOTE — TELEPHONE ENCOUNTER
PA required. [employer] [place] phone # [phone].    PA submitted via [employer] Online Portal.

## 2023-02-06 ENCOUNTER — PATIENT MESSAGE (OUTPATIENT)
Dept: NEUROSURGERY | Facility: CLINIC | Age: 48
End: 2023-02-06
Payer: COMMERCIAL

## 2023-02-07 ENCOUNTER — OFFICE VISIT (OUTPATIENT)
Dept: RADIATION ONCOLOGY | Facility: CLINIC | Age: 48
End: 2023-02-07
Payer: COMMERCIAL

## 2023-02-07 DIAGNOSIS — C71.1 MALIGNANT NEOPLASM OF FRONTAL LOBE: ICD-10-CM

## 2023-02-07 PROBLEM — D49.6 BRAIN TUMOR: Status: ACTIVE | Noted: 2023-01-12

## 2023-02-07 PROCEDURE — 1111F DSCHRG MED/CURRENT MED MERGE: CPT | Mod: CPTII,95,, | Performed by: RADIOLOGY

## 2023-02-07 PROCEDURE — 99204 PR OFFICE/OUTPT VISIT, NEW, LEVL IV, 45-59 MIN: ICD-10-PCS | Mod: 95,,, | Performed by: RADIOLOGY

## 2023-02-07 PROCEDURE — 1111F PR DISCHARGE MEDS RECONCILED W/ CURRENT OUTPATIENT MED LIST: ICD-10-PCS | Mod: CPTII,95,, | Performed by: RADIOLOGY

## 2023-02-07 PROCEDURE — 99204 OFFICE O/P NEW MOD 45 MIN: CPT | Mod: 95,,, | Performed by: RADIOLOGY

## 2023-02-07 NOTE — PROGRESS NOTES
2/7/2023    The patient location is: Home, [place], [place]  The chief complaint leading to consultation is: Left frontal low grade glioma    Visit type: audiovisual    Face to Face time with patient: 20 minutes  50 minutes of total time spent on the encounter, which includes face to face time and non-face to face time preparing to see the patient (eg, review of tests), Obtaining and/or reviewing separately obtained history, Documenting clinical information in the electronic or other health record, Independently interpreting results (not separately reported) and communicating results to the patient/family/caregiver, or Care coordination (not separately reported).   Each patient to whom he or she provides medical services by telemedicine is:  (1) informed of the relationship between the physician and patient and the respective role of any other health care provider with respect to management of the patient; and (2) notified that he or she may decline to receive medical services by telemedicine and may withdraw from such care at any time.      Radiation Oncology Consultation    Assessment   This is a 47 y.o. 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 He is s/p STR by Dr. Park 1/12/23. He presented to acute onset seizures. He is referred for consideration of adjuvant therapy.     I reviewed the natural history and treatment options for low grade glioma with the patient. I discussed that older trials demonstrated that observation (delayed treatment until progression) may be reasonable for some patients, but newer trials have allowed us to assess risk categories based on IHC and genomic classifications. EORTC 14235-07276 (comparing RT alone to Temozolomide alone) found that median progression free survival for patients with IDH mutation was 3 years with TMZ alone and 4.5 years with RT alone. The phase II RTOG 0424 trial of  RT + TMZ for high risk patients with Low Grade Glioma reported median PFS of 6.4 years for patients with intermediate risk LGG; when genomic classification was subsequently examined for a subset of patients, patients with IDH-mutant non-co-deleted tumors had a median OS of 8.8 years. It should be noted that RTOG 0424 was a non-randomized trial. Separately, interim results from the phase III CATNON trial examining the benefit of concurrent and adjuvant TMZ with radiation has observed that the primary benefit of TMZ for IDH-mutant high-grade gliomas is in the adjuvant setting. If he is found to have 1p 19q co-deletion, his prognosis is anticipated to be significantly better than the above outcomes.     I presented the patient with 2 options: 1) Deferring additional treatment until imaging or symptomatic progression or 2) proceeding with adjuvant radiation followed by chemotherapy (TMZ vs PCV, to be discussed with Neuro-Oncology). For radiation, I would treat T2 FLAIR plus margin to 54 Gy over 30 fractions using IMRT to limit dose to uninvolved brain and scalp. Potential short- and long-term side effects of radiotherapy were reviewed.            Plan   1) He would like treatment closer to home in [place]. I will refer him to Dr. Prieto Carson at [facility].  2) He will receive chemotherapy with Dr. Roman; labs and chemo follow-up/monitoring per Dr. Roman.            CHIEF COMPLAINT: Left frontal low grade glioma    HPI/Focused ROS: Mr. Pool is a 46 y/o male who developed seizure while at home at the end of December 2022. He went to outside ED and had imaging of the brain 1/1/23 that demonstrated a FLAIR hyperintense lesion in the Left frontal lobe. He was referred to Dr. Park for evaluation and underwent subtotal resection on 1/12/23 due to location with involvement of motor cortex. 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 He is referred for consideration of adjuvant therapy.    Following surgery, he reports feeling well overall. No further seizures. Denies focal weakness, gait imbalance, or speech difficulty.       Is the patient female between ages 15-55:  no    Does the patient have a CIED:  no    Prior Radiation History: None      Past Medical History:   Diagnosis Date    Brain tumor 1/12/2023    Seizures 1/12/2023       Past Surgical History:   Procedure Laterality Date    CRANIOTOMY USING FRAMELESS STEREOTAXY Left 1/12/2023    Procedure: CRANIOTOMY, USING FRAMELESS STEREOTAXY;  Surgeon: Wellington Park MD;  Location: [facility] OR [address] [place] [address];  Service: Neurosurgery;  Laterality: Left;            Cancer-related family history is not on file.    Current Outpatient Medications on File Prior to Visit   Medication Sig Dispense Refill    acetaminophen (TYLENOL) 500 MG tablet Take 1,000 mg by mouth every 6 (six) hours as needed for Pain.      dexAMETHasone (DECADRON) 2 MG tablet Take 2 tablets (4mg) every 6 hours for 3 days, then take 2 tablet (4mg) every 8 hours for 3 days, then take 2 tablets (4mg) every 12 hours for 3 days, then take 1 tablet (2mg) every 8 hours for 3 days, then take 1 tablet (2mg) every 12 hours for 3 days, then take 1 tablet (2mg) daily for 6 days, then OFF.Tablets: 75 75 tablet 0    KLONOPIN 0.5 mg tablet Take 0.5 mg by mouth daily as needed.      levETIRAcetam (KEPPRA) 500 MG Tab Take 500 mg by mouth 2 (two) times daily.      omeprazole (PRILOSEC) 40 MG capsule Take 40 mg by mouth every morning.      oxyCODONE-acetaminophen (PERCOCET) 5-325 mg per tablet Take 1 tablet by mouth every 4 (four) hours as needed for Pain. 56 tablet 0    temozolomide (TEMODAR) 140 MG capsule Take 1 capsule (140 mg total) by mouth once daily Take as directed days 1-42 (6 weeks). Take on an empty stomach. with 1 other temozolomide prescription for 145 mg total. 42 capsule 0    temozolomide (TEMODAR) 5 MG capsule  Take 1 capsule (5 mg total) by mouth once daily Take as directed days 1-42 (6 weeks). Take on an empty stomach. with 1 other temozolomide prescription for 145 mg total. 42 capsule 0    traZODone (DESYREL) 50 MG tablet Take 50 mg by mouth every evening.      zolpidem (AMBIEN) 10 mg Tab Take 10 mg by mouth nightly as needed.       No current facility-administered medications on file prior to visit.       Review of patient's allergies indicates:  No Known Allergies      Vital Signs: There were no vitals taken for this visit.    ECOG Performance Status: 0 - Fully Active    Physical Exam  Constitutional:       Appearance: Normal appearance.   HENT:      Head: Normocephalic and atraumatic.   Eyes:      General: No scleral icterus.     Extraocular Movements: Extraocular movements intact.   Pulmonary:      Effort: No accessory muscle usage or respiratory distress.   Musculoskeletal:      Cervical back: Normal range of motion.   Neurological:      Mental Status: He is alert and oriented to person, place, and time.   Psychiatric:         Mood and Affect: Mood and affect normal.         Judgment: Judgment normal.        Labs:    Imaging: I have personally reviewed the patient's available images and reports and summarized pertinent findings above in HPI.     Pathology: I have personally reviewed the patient's available pathology and summarized pertinent findings above in HPI.       - Thank you for allowing me to participate in the care of your patient.    Milan Fontaine MD, PhD

## 2023-02-08 ENCOUNTER — PATIENT MESSAGE (OUTPATIENT)
Dept: RADIATION ONCOLOGY | Facility: CLINIC | Age: 48
End: 2023-02-08
Payer: COMMERCIAL

## 2023-02-08 ENCOUNTER — PATIENT MESSAGE (OUTPATIENT)
Dept: NEUROSURGERY | Facility: CLINIC | Age: 48
End: 2023-02-08
Payer: COMMERCIAL

## 2023-02-08 NOTE — TELEPHONE ENCOUNTER
PA approved! From 02/03/23 through 02/03/24. Approval number [id number]  Spoke with Koif EASON Requested for Preferred Pharmacy    Benefits Investigation: Complete  Insurance name: [employer]  Rep name: Tj AHRRIS  Copay amount: $120  Deductible: $ N/A  OOPmax: $2,000  [facility] in network?  N - Plan prefers [facility]  Tier level (if applicable):     Will release script to [facility], pt notified and in agreement with plan.     Will also close pt out of [facility]

## 2023-02-13 LAB
COMMENT: NORMAL
FINAL PATHOLOGIC DIAGNOSIS: NORMAL
FROZEN SECTION DIAGNOSIS: NORMAL
GROSS: NORMAL
Lab: NORMAL
MICROSCOPIC EXAM: NORMAL
SUPPLEMENTAL DIAGNOSIS: NORMAL

## 2023-02-14 ENCOUNTER — PATIENT MESSAGE (OUTPATIENT)
Dept: RADIATION ONCOLOGY | Facility: CLINIC | Age: 48
End: 2023-02-14
Payer: COMMERCIAL

## 2023-02-23 ENCOUNTER — PATIENT MESSAGE (OUTPATIENT)
Dept: NEUROSURGERY | Facility: CLINIC | Age: 48
End: 2023-02-23
Payer: COMMERCIAL

## 2023-02-23 DIAGNOSIS — Z51.11 ENCOUNTER FOR CHEMOTHERAPY MANAGEMENT: Primary | ICD-10-CM

## 2023-02-23 DIAGNOSIS — Z51.11 CHEMOTHERAPY MANAGEMENT, ENCOUNTER FOR: Primary | ICD-10-CM

## 2023-02-23 RX ORDER — ONDANSETRON HYDROCHLORIDE 8 MG/1
8 TABLET, FILM COATED ORAL SEE ADMIN INSTRUCTIONS
Qty: 30 TABLET | Refills: 3 | Status: SHIPPED | OUTPATIENT
Start: 2023-02-23 | End: 2023-08-10

## 2023-02-24 ENCOUNTER — PATIENT MESSAGE (OUTPATIENT)
Dept: NEUROSURGERY | Facility: CLINIC | Age: 48
End: 2023-02-24
Payer: COMMERCIAL

## 2023-02-28 ENCOUNTER — TUMOR BOARD CONFERENCE (OUTPATIENT)
Dept: NEUROSURGERY | Facility: CLINIC | Age: 48
End: 2023-02-28
Payer: COMMERCIAL

## 2023-03-01 NOTE — PROGRESS NOTES
[facility]   NEURO-ONCOLOGICAL MULTIDISCIPLINARY TUMOR BOARD  PATIENT REVIEW FORM  ____________________________________________________________  Bonnie TRAN, attest that this documentation has been prepared under the direction and in the presence of Wellington Park MD.    PATIENT ID: Marvin Pool is a 47 y.o. male  DATE: 2/28/2023.    This patient's relevant clinical data was reviewed before the multidisciplinary tumor board in order to establish an optimum treatment plan in this patient. The patient's clinical data were presented at our Multi-Disciplinary Tumor Board which included representatives of Neurosurgery, Neuro-Radiology, Neuro-Pathology, Radiation Oncology, Medical Oncology, Palliative Medicine.    PRESENTER:   Dr. Park    PATIENT SUMMARY:   The patient is a 47 y.o.  male with with h/o seizures.  S/p craniotomy for partial tumor resection. Was presented on 1/31 tumor board with neuro-onc panel pending for 1p 19q status.  2/23/23 Started RT (with TMZ) at [facility] (close to home)    Additionally, we reviewed previous medical and familial history of present illness, and recent lab results along with all available histopathologic and imaging studies.    IMAGING:   MRI Brain W WO Contrast (2/28/2023)  Expected immediate postsurgical changes of left frontal craniotomy for frontal mass resection. Persistent area of T2 FLAIR hyperintense signal surrounding the resection is similar size compared to preoperative MRI. Typical thin peripheral enhancement around the resection cavity.     PATHOLOGY:  1- Brain, left frontal lobe, craniotomy with resection:   Oligodendroglioma, IDH-mutant and 1p/19q-codeleted   CNS WHO Grade 2    BOARD RECOMMENDATIONS:   The tumor board considered available treatment options and made the following recommendations: TMZ with adjuvant PCV, will discuss with Dr. Roman. Continue interval follow up at 3 months. This will serve as a new baseline. Will need  MRI 4 weeks after radiation    National site-specific guidelines were discussed with respect to the case.     Tumor board is a meeting of clinicians from various specialty areas who evaluate and discuss patients for whom a multidisciplinary approach is being considered. Final determinations in the plan of care are those of the provider(s). The responsibility for follow up recommendations given during tumor board is that of the provider.     CONSULT NEEDED:     [] Neurosurgery    [] Hem/Onc    [] Rad/Onc         [] Endocrinology     [] Neuro-ophthalmology    [] Palliative Medicine      []  Other:       PRESENTATION AT CANCER CONFERENCE:         [] Prospective    [x] Retrospective     [] Follow-Up          [] Eligible for clinical trial/Clinical trial status:

## 2023-03-08 ENCOUNTER — PATIENT MESSAGE (OUTPATIENT)
Dept: NEUROSURGERY | Facility: CLINIC | Age: 48
End: 2023-03-08
Payer: COMMERCIAL

## 2023-03-14 ENCOUNTER — OFFICE VISIT (OUTPATIENT)
Dept: NEUROSURGERY | Facility: CLINIC | Age: 48
End: 2023-03-14
Payer: COMMERCIAL

## 2023-03-14 ENCOUNTER — LAB VISIT (OUTPATIENT)
Dept: LAB | Facility: HOSPITAL | Age: 48
End: 2023-03-14
Attending: PSYCHIATRY & NEUROLOGY
Payer: COMMERCIAL

## 2023-03-14 VITALS — HEART RATE: 80 BPM | DIASTOLIC BLOOD PRESSURE: 75 MMHG | SYSTOLIC BLOOD PRESSURE: 120 MMHG

## 2023-03-14 DIAGNOSIS — R56.9 SEIZURE: ICD-10-CM

## 2023-03-14 DIAGNOSIS — C71.9 OLIGODENDROGLIOMA: ICD-10-CM

## 2023-03-14 DIAGNOSIS — C71.1 MALIGNANT NEOPLASM OF FRONTAL LOBE: Primary | Chronic | ICD-10-CM

## 2023-03-14 DIAGNOSIS — Z51.11 ENCOUNTER FOR CHEMOTHERAPY MANAGEMENT: ICD-10-CM

## 2023-03-14 DIAGNOSIS — D64.9 ANEMIA, UNSPECIFIED TYPE: ICD-10-CM

## 2023-03-14 LAB
ALBUMIN SERPL BCP-MCNC: 3.9 G/DL (ref 3.5–5.2)
ALP SERPL-CCNC: 113 U/L (ref 55–135)
ALT SERPL W/O P-5'-P-CCNC: 51 U/L (ref 10–44)
ANION GAP SERPL CALC-SCNC: 6 MMOL/L (ref 8–16)
AST SERPL-CCNC: 34 U/L (ref 10–40)
BASOPHILS # BLD AUTO: 0.04 K/UL (ref 0–0.2)
BASOPHILS NFR BLD: 0.4 % (ref 0–1.9)
BILIRUB SERPL-MCNC: 0.5 MG/DL (ref 0.1–1)
BUN SERPL-MCNC: 12 MG/DL (ref 6–20)
CALCIUM SERPL-MCNC: 10.3 MG/DL (ref 8.7–10.5)
CHLORIDE SERPL-SCNC: 104 MMOL/L (ref 95–110)
CO2 SERPL-SCNC: 29 MMOL/L (ref 23–29)
CREAT SERPL-MCNC: 1.1 MG/DL (ref 0.5–1.4)
DIFFERENTIAL METHOD: ABNORMAL
EOSINOPHIL # BLD AUTO: 0.9 K/UL (ref 0–0.5)
EOSINOPHIL NFR BLD: 8.1 % (ref 0–8)
ERYTHROCYTE [DISTWIDTH] IN BLOOD BY AUTOMATED COUNT: 13.2 % (ref 11.5–14.5)
EST. GFR  (NO RACE VARIABLE): >60 ML/MIN/1.73 M^2
GLUCOSE SERPL-MCNC: 112 MG/DL (ref 70–110)
HCT VFR BLD AUTO: 42 % (ref 40–54)
HGB BLD-MCNC: 13.7 G/DL (ref 14–18)
IMM GRANULOCYTES # BLD AUTO: 0.09 K/UL (ref 0–0.04)
IMM GRANULOCYTES NFR BLD AUTO: 0.8 % (ref 0–0.5)
LYMPHOCYTES # BLD AUTO: 1.4 K/UL (ref 1–4.8)
LYMPHOCYTES NFR BLD: 12.6 % (ref 18–48)
MCH RBC QN AUTO: 26.6 PG (ref 27–31)
MCHC RBC AUTO-ENTMCNC: 32.6 G/DL (ref 32–36)
MCV RBC AUTO: 81 FL (ref 82–98)
MONOCYTES # BLD AUTO: 1.2 K/UL (ref 0.3–1)
MONOCYTES NFR BLD: 10.8 % (ref 4–15)
NEUTROPHILS # BLD AUTO: 7.7 K/UL (ref 1.8–7.7)
NEUTROPHILS NFR BLD: 67.3 % (ref 38–73)
NRBC BLD-RTO: 0 /100 WBC
PLATELET # BLD AUTO: 484 K/UL (ref 150–450)
PMV BLD AUTO: 9.7 FL (ref 9.2–12.9)
POTASSIUM SERPL-SCNC: 4.5 MMOL/L (ref 3.5–5.1)
PROT SERPL-MCNC: 7.9 G/DL (ref 6–8.4)
RBC # BLD AUTO: 5.16 M/UL (ref 4.6–6.2)
SODIUM SERPL-SCNC: 139 MMOL/L (ref 136–145)
WBC # BLD AUTO: 11.4 K/UL (ref 3.9–12.7)

## 2023-03-14 PROCEDURE — 99214 OFFICE O/P EST MOD 30 MIN: CPT | Mod: S$GLB,,, | Performed by: PSYCHIATRY & NEUROLOGY

## 2023-03-14 PROCEDURE — 99999 PR PBB SHADOW E&M-EST. PATIENT-LVL III: CPT | Mod: PBBFAC,,, | Performed by: PSYCHIATRY & NEUROLOGY

## 2023-03-14 PROCEDURE — 99214 PR OFFICE/OUTPT VISIT, EST, LEVL IV, 30-39 MIN: ICD-10-PCS | Mod: S$GLB,,, | Performed by: PSYCHIATRY & NEUROLOGY

## 2023-03-14 PROCEDURE — 1159F MED LIST DOCD IN RCRD: CPT | Mod: CPTII,S$GLB,, | Performed by: PSYCHIATRY & NEUROLOGY

## 2023-03-14 PROCEDURE — 3078F DIAST BP <80 MM HG: CPT | Mod: CPTII,S$GLB,, | Performed by: PSYCHIATRY & NEUROLOGY

## 2023-03-14 PROCEDURE — 85025 COMPLETE CBC W/AUTO DIFF WBC: CPT | Performed by: PSYCHIATRY & NEUROLOGY

## 2023-03-14 PROCEDURE — 3074F SYST BP LT 130 MM HG: CPT | Mod: CPTII,S$GLB,, | Performed by: PSYCHIATRY & NEUROLOGY

## 2023-03-14 PROCEDURE — 36415 COLL VENOUS BLD VENIPUNCTURE: CPT | Performed by: PSYCHIATRY & NEUROLOGY

## 2023-03-14 PROCEDURE — 3074F PR MOST RECENT SYSTOLIC BLOOD PRESSURE < 130 MM HG: ICD-10-PCS | Mod: CPTII,S$GLB,, | Performed by: PSYCHIATRY & NEUROLOGY

## 2023-03-14 PROCEDURE — 99999 PR PBB SHADOW E&M-EST. PATIENT-LVL III: ICD-10-PCS | Mod: PBBFAC,,, | Performed by: PSYCHIATRY & NEUROLOGY

## 2023-03-14 PROCEDURE — 3078F PR MOST RECENT DIASTOLIC BLOOD PRESSURE < 80 MM HG: ICD-10-PCS | Mod: CPTII,S$GLB,, | Performed by: PSYCHIATRY & NEUROLOGY

## 2023-03-14 PROCEDURE — 1159F PR MEDICATION LIST DOCUMENTED IN MEDICAL RECORD: ICD-10-PCS | Mod: CPTII,S$GLB,, | Performed by: PSYCHIATRY & NEUROLOGY

## 2023-03-14 PROCEDURE — 80053 COMPREHEN METABOLIC PANEL: CPT | Performed by: PSYCHIATRY & NEUROLOGY

## 2023-03-14 NOTE — PROGRESS NOTES
"  Subjective:       Patient ID: Marvin Pool is a 47 y.o. male.    Chief Complaint: Brain tumor    HPI  Oncologic History  2020: In restrospect, started taking BC powder/having comprehension/speech issues  1/2023: had a seizure in his sleep; when recalling it to family, thought it was sleep paralysis. While out to lunch shortly after with family, had a seizure at the restaurant, then another witnessed by EMS. MRI brain at OSH showed left frontal mass. Transferred to [facility] for higher level of care.  1/12/2023: subtotal resection ([facility]) with pathology consistent with oligodendroglioma, 1p/19q co-deleted, WHO grade 2  3/3/2023-present: chemoradiation in [place], [place] (Prieto Carson)    Tolerating chemoradiation without issue. Noticing some "bone pain" that is relieved with acetaminophen.      Past Medical History:   Diagnosis Date    Brain tumor 1/12/2023    Seizures 1/12/2023      Current Outpatient Medications   Medication Sig Dispense Refill    acetaminophen (TYLENOL) 500 MG tablet Take 1,000 mg by mouth every 6 (six) hours as needed for Pain.      dexAMETHasone (DECADRON) 2 MG tablet Take 2 tablets (4mg) every 6 hours for 3 days, then take 2 tablet (4mg) every 8 hours for 3 days, then take 2 tablets (4mg) every 12 hours for 3 days, then take 1 tablet (2mg) every 8 hours for 3 days, then take 1 tablet (2mg) every 12 hours for 3 days, then take 1 tablet (2mg) daily for 6 days, then OFF.Tablets: 75 75 tablet 0    KLONOPIN 0.5 mg tablet Take 0.5 mg by mouth daily as needed.      levETIRAcetam (KEPPRA) 500 MG Tab Take 500 mg by mouth 2 (two) times daily.      omeprazole (PRILOSEC) 40 MG capsule Take 40 mg by mouth every morning.      ondansetron (ZOFRAN) 8 MG tablet Take 1 tablet (8 mg total) by mouth As instructed for Nausea (TAKE 1 TABLET BY MOUTH 30-45 MINUTES PRIOR TO CHEMOTHERAPY DOSE AND EVERY 8 HOURS AS NEEDED). 30 tablet 3    oxyCODONE-acetaminophen (PERCOCET) 5-325 mg per tablet Take 1 tablet by " mouth every 4 (four) hours as needed for Pain. 56 tablet 0    temozolomide (TEMODAR) 140 MG capsule Take 1 capsule (140 mg total) by mouth once daily Take as directed days 1-42 (6 weeks). Take on an empty stomach. with 1 other temozolomide prescription for 145 mg total. 42 capsule 0    temozolomide (TEMODAR) 5 MG capsule Take 1 capsule (5 mg total) by mouth once daily Take as directed days 1-42 (6 weeks). Take on an empty stomach. with 1 other temozolomide prescription for 145 mg total. 42 capsule 0    traZODone (DESYREL) 50 MG tablet Take 50 mg by mouth every evening.      zolpidem (AMBIEN) 10 mg Tab Take 10 mg by mouth nightly as needed.       No current facility-administered medications for this visit.      Past Surgical History:   Procedure Laterality Date    CRANIOTOMY USING FRAMELESS STEREOTAXY Left 1/12/2023    Procedure: CRANIOTOMY, USING FRAMELESS STEREOTAXY;  Surgeon: Wellington Park MD;  Location: [facility] OR [address] [place] [address];  Service: Neurosurgery;  Laterality: Left;      No family history on file.       Review of Systems   Constitutional:  Negative for unexpected weight change.   Eyes:  Negative for visual disturbance.   Respiratory:  Negative for shortness of breath.    Cardiovascular:  Negative for leg swelling.   Gastrointestinal:  Negative for abdominal distention.   Musculoskeletal:  Negative for gait problem.   Neurological:  Negative for seizures and weakness.   Psychiatric/Behavioral:  Positive for agitation and sleep disturbance.    KPS: 100      Objective:      Vitals:    03/14/23 1026   BP: 120/75   Pulse: 80          NEUROLOGICAL EXAMINATION:     MENTAL STATUS   Attention: normal. Concentration: normal.   Speech: speech is normal   Level of consciousness: alert  Knowledge: good.     CRANIAL NERVES   Cranial nerves II through XII intact.     MOTOR EXAM     Strength   Strength 5/5 throughout.     REFLEXES     Reflexes   Right brachioradialis: 2+  Left brachioradialis: 2+  Right biceps: 2+  Left biceps:  2+  Right patellar: 2+  Left patellar: 2+  Right achilles: 2+  Left achilles: 2+    SENSORY EXAM   Light touch normal.     GAIT AND COORDINATION     Gait  Gait: normal     Coordination   Finger to nose coordination: normal     MRI brain from 1/12/2023 was personally visualized and compared to prior. This study shows expected post-operative changes from STR.    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 Tumor diagnosis was heralded by seizures. Current symptoms include none.    Oligodendroglioma  Labs are adequate for continuation of chemoradiation.    - Continue temozolomide 145mg nightly  - Pre-treat with ondansetron 8mg 30-45 min prior to chemotherapy dose. Take any regular nighttime medications with the ondansetron. Take chemotherapy just prior to bedtime. Do not eat for about 2 hours prior to taking the chemotherapy.  - Docusate daily and laxatives every 2-3 days as needed.  - Weekly CBC and q3 week CMP have been ordered for completion during chemoradiation.    Seizures  Continue keppra. May consider switching to breviracetam or lacosamide if irritability persists.    Anemia  Microcytic. Check iron studies at next lab draw.        30 minutes of total time spent on the encounter, which includes face to face time and non-face to face time preparing to see the patient (eg, review of tests), Obtaining and/or reviewing separately obtained history, Documenting clinical information in the electronic or other health record, Independently interpreting results (not separately reported) and communicating results to the  patient/family/caregiver, or Care coordination (not separately reported).     Alyse Roman MD  Neuro-Oncology, Movement Disorders

## 2023-03-16 ENCOUNTER — PATIENT MESSAGE (OUTPATIENT)
Dept: NEUROLOGY | Facility: CLINIC | Age: 48
End: 2023-03-16
Payer: COMMERCIAL

## 2023-03-16 NOTE — TELEPHONE ENCOUNTER
Notified by Pt that [facility] continues to say temozolomide 140 mg cannot be sent prior to 4/2/23.     Called and spoke to Margie, representative at [facility] to clarify.     Margie spoke with her supervisor to clear out issue. Stated pharmacy will call patient to set up delivery for possible Saturday, 3/18.

## 2023-03-16 NOTE — TELEPHONE ENCOUNTER
Spoke to [facility] rep regarding pt temozolomide 145 mg due to be refilled and delivered. Pt reported he was told by another representative medication was not up for refill, however, pt was supplied 21 day supply out of 42 days. Rina stated pt is eligible for refill of both the temozolomide 5 mg and 140 mg tablets for 21 day supply. Informed writer pt needs to call pharmacy and schedule refill and delivery for as soon as possible, as pt reports being out of medication. Pt also on pharmacy call list for urgent contact.     Called Pt with update of information provided by [facility]. Informed Pt to please contact [facility] to set up refill and schedule delivery for as soon as possible. Instructed to call back to office with continued problems.

## 2023-04-03 ENCOUNTER — PATIENT MESSAGE (OUTPATIENT)
Dept: NEUROSURGERY | Facility: CLINIC | Age: 48
End: 2023-04-03
Payer: COMMERCIAL

## 2023-04-03 DIAGNOSIS — Z51.11 ENCOUNTER FOR CHEMOTHERAPY MANAGEMENT: Primary | ICD-10-CM

## 2023-04-04 ENCOUNTER — PATIENT MESSAGE (OUTPATIENT)
Dept: NEUROLOGY | Facility: CLINIC | Age: 48
End: 2023-04-04
Payer: COMMERCIAL

## 2023-04-04 ENCOUNTER — TELEPHONE (OUTPATIENT)
Dept: NEUROSURGERY | Facility: CLINIC | Age: 48
End: 2023-04-04
Payer: COMMERCIAL

## 2023-04-04 DIAGNOSIS — C71.9 GLIOMA: Primary | ICD-10-CM

## 2023-04-06 ENCOUNTER — OFFICE VISIT (OUTPATIENT)
Dept: NEUROLOGY | Facility: CLINIC | Age: 48
End: 2023-04-06
Payer: COMMERCIAL

## 2023-04-06 DIAGNOSIS — R56.9 SEIZURES: Chronic | ICD-10-CM

## 2023-04-06 DIAGNOSIS — C71.9 OLIGODENDROGLIOMA: Primary | ICD-10-CM

## 2023-04-06 PROCEDURE — 99213 OFFICE O/P EST LOW 20 MIN: CPT | Mod: 95,,, | Performed by: PSYCHIATRY & NEUROLOGY

## 2023-04-06 PROCEDURE — 99213 PR OFFICE/OUTPT VISIT, EST, LEVL III, 20-29 MIN: ICD-10-PCS | Mod: 95,,, | Performed by: PSYCHIATRY & NEUROLOGY

## 2023-04-06 NOTE — PROGRESS NOTES
Subjective:       Patient ID: Marvin Pool is a 47 y.o. male.    Chief Complaint: Brain tumor    HPI  Oncologic History  2020: In restrospect, started taking BC powder/having comprehension/speech issues  1/2023: had a seizure in his sleep; when recalling it to family, thought it was sleep paralysis. While out to lunch shortly after with family, had a seizure at the restaurant, then another witnessed by EMS. MRI brain at OSH showed left frontal mass. Transferred to [facility] for higher level of care.  1/12/2023: subtotal resection ([facility]) with pathology consistent with oligodendroglioma, 1p/19q co-deleted, WHO grade 2  3/3/2023-4/3/2023: chemoradiation in [place], [place] (Prieto Carson)    Tolerated chemoradiation without issue.       Past Medical History:   Diagnosis Date    Brain tumor 1/12/2023    Seizures 1/12/2023      Current Outpatient Medications   Medication Sig Dispense Refill    acetaminophen (TYLENOL) 500 MG tablet Take 1,000 mg by mouth every 6 (six) hours as needed for Pain.      dexAMETHasone (DECADRON) 2 MG tablet Take 2 tablets (4mg) every 6 hours for 3 days, then take 2 tablet (4mg) every 8 hours for 3 days, then take 2 tablets (4mg) every 12 hours for 3 days, then take 1 tablet (2mg) every 8 hours for 3 days, then take 1 tablet (2mg) every 12 hours for 3 days, then take 1 tablet (2mg) daily for 6 days, then OFF.Tablets: 75 75 tablet 0    KLONOPIN 0.5 mg tablet Take 0.5 mg by mouth daily as needed.      levETIRAcetam (KEPPRA) 500 MG Tab Take 500 mg by mouth 2 (two) times daily.      omeprazole (PRILOSEC) 40 MG capsule Take 40 mg by mouth every morning.      ondansetron (ZOFRAN) 8 MG tablet Take 1 tablet (8 mg total) by mouth As instructed for Nausea (TAKE 1 TABLET BY MOUTH 30-45 MINUTES PRIOR TO CHEMOTHERAPY DOSE AND EVERY 8 HOURS AS NEEDED). 30 tablet 3    oxyCODONE-acetaminophen (PERCOCET) 5-325 mg per tablet Take 1 tablet by mouth every 4 (four) hours as needed for Pain. 56 tablet 0     traZODone (DESYREL) 50 MG tablet Take 50 mg by mouth every evening.      zolpidem (AMBIEN) 10 mg Tab Take 10 mg by mouth nightly as needed.       No current facility-administered medications for this visit.      Past Surgical History:   Procedure Laterality Date    CRANIOTOMY USING FRAMELESS STEREOTAXY Left 1/12/2023    Procedure: CRANIOTOMY, USING FRAMELESS STEREOTAXY;  Surgeon: Wellington Park MD;  Location: [facility] OR [address] [place] [address];  Service: Neurosurgery;  Laterality: Left;      No family history on file.       Review of Systems   Constitutional:  Negative for unexpected weight change.   Eyes:  Negative for visual disturbance.   Respiratory:  Negative for shortness of breath.    Cardiovascular:  Negative for leg swelling.   Gastrointestinal:  Negative for abdominal distention.   Musculoskeletal:  Negative for gait problem.   Neurological:  Negative for seizures and weakness.   Psychiatric/Behavioral:  Positive for agitation and sleep disturbance.    KPS: 100      Objective:      There were no vitals filed for this visit.  Telehealth     (Prior)  NEUROLOGICAL EXAMINATION:     MENTAL STATUS   Attention: normal. Concentration: normal.   Speech: speech is normal   Level of consciousness: alert  Knowledge: good.     CRANIAL NERVES   Cranial nerves II through XII intact.     MOTOR EXAM     Strength   Strength 5/5 throughout.     REFLEXES     Reflexes   Right brachioradialis: 2+  Left brachioradialis: 2+  Right biceps: 2+  Left biceps: 2+  Right patellar: 2+  Left patellar: 2+  Right achilles: 2+  Left achilles: 2+    SENSORY EXAM   Light touch normal.     GAIT AND COORDINATION     Gait  Gait: normal     Coordination   Finger to nose coordination: normal     MRI brain from 1/12/2023 was personally visualized and compared to prior. This study shows expected post-operative changes from STR.    Assessment:       Problem List Items Addressed This Visit          Neuro    Seizures (Chronic)     Other Visit Diagnoses        Oligodendroglioma    -  Primary                  Plan:       Marvin Pool is a 47 y.o. 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 Tumor diagnosis was heralded by seizures. Current symptoms include none.    Oligodendroglioma  Completed chemoradiation. F/u in 4 weeks with MRI brain, CBC/CMP in anticipation of adjuvant TMZ.    Seizures  Continue keppra. May consider switching to breviracetam or lacosamide if irritability persists.          The patient location is: home in [place]  The chief complaint leading to consultation is: brain tumor    Visit type: audiovisual    Face to Face time with patient: 5 minutes  20 minutes of total time spent on the encounter, which includes face to face time and non-face to face time preparing to see the patient (eg, review of tests), Obtaining and/or reviewing separately obtained history, Documenting clinical information in the electronic or other health record, Independently interpreting results (not separately reported) and communicating results to the patient/family/caregiver, or Care coordination (not separately reported).         Each patient to whom he or she provides medical services by telemedicine is:  (1) informed of the relationship between the physician and patient and the respective role of any other health care provider with respect to management of the patient; and (2) notified that he or she may decline to receive medical services by telemedicine and may withdraw from such care at any time.    Notes:       Alyse Roman MD  Neuro-Oncology, Movement Disorders

## 2023-04-19 ENCOUNTER — PATIENT MESSAGE (OUTPATIENT)
Dept: NEUROSURGERY | Facility: CLINIC | Age: 48
End: 2023-04-19
Payer: COMMERCIAL

## 2023-04-25 ENCOUNTER — PATIENT MESSAGE (OUTPATIENT)
Dept: NEUROSURGERY | Facility: CLINIC | Age: 48
End: 2023-04-25
Payer: COMMERCIAL

## 2023-05-02 ENCOUNTER — LAB VISIT (OUTPATIENT)
Dept: LAB | Facility: HOSPITAL | Age: 48
End: 2023-05-02
Attending: PSYCHIATRY & NEUROLOGY
Payer: COMMERCIAL

## 2023-05-02 ENCOUNTER — OFFICE VISIT (OUTPATIENT)
Dept: NEUROSURGERY | Facility: CLINIC | Age: 48
End: 2023-05-02
Payer: COMMERCIAL

## 2023-05-02 VITALS
HEART RATE: 73 BPM | TEMPERATURE: 98 F | BODY MASS INDEX: 24.55 KG/M2 | WEIGHT: 171.5 LBS | HEIGHT: 70 IN | SYSTOLIC BLOOD PRESSURE: 127 MMHG | DIASTOLIC BLOOD PRESSURE: 83 MMHG

## 2023-05-02 VITALS — SYSTOLIC BLOOD PRESSURE: 136 MMHG | DIASTOLIC BLOOD PRESSURE: 83 MMHG | HEART RATE: 96 BPM | TEMPERATURE: 97 F

## 2023-05-02 DIAGNOSIS — R56.9 SEIZURES: Chronic | ICD-10-CM

## 2023-05-02 DIAGNOSIS — Z51.11 ENCOUNTER FOR CHEMOTHERAPY MANAGEMENT: ICD-10-CM

## 2023-05-02 DIAGNOSIS — C71.1 MALIGNANT NEOPLASM OF FRONTAL LOBE: Primary | Chronic | ICD-10-CM

## 2023-05-02 DIAGNOSIS — D49.6 BRAIN TUMOR: ICD-10-CM

## 2023-05-02 DIAGNOSIS — D64.9 ANEMIA, UNSPECIFIED TYPE: ICD-10-CM

## 2023-05-02 DIAGNOSIS — C71.9 GLIOMA: Primary | ICD-10-CM

## 2023-05-02 DIAGNOSIS — C71.9 GLIOMA: ICD-10-CM

## 2023-05-02 DIAGNOSIS — C71.1 MALIGNANT NEOPLASM OF FRONTAL LOBE: ICD-10-CM

## 2023-05-02 LAB
ALBUMIN SERPL BCP-MCNC: 4.2 G/DL (ref 3.5–5.2)
ALP SERPL-CCNC: 78 U/L (ref 55–135)
ALT SERPL W/O P-5'-P-CCNC: 33 U/L (ref 10–44)
ANION GAP SERPL CALC-SCNC: 11 MMOL/L (ref 8–16)
AST SERPL-CCNC: 27 U/L (ref 10–40)
BASOPHILS # BLD AUTO: 0.06 K/UL (ref 0–0.2)
BASOPHILS NFR BLD: 0.5 % (ref 0–1.9)
BILIRUB SERPL-MCNC: 0.6 MG/DL (ref 0.1–1)
BUN SERPL-MCNC: 15 MG/DL (ref 6–20)
CALCIUM SERPL-MCNC: 9.9 MG/DL (ref 8.7–10.5)
CHLORIDE SERPL-SCNC: 105 MMOL/L (ref 95–110)
CO2 SERPL-SCNC: 24 MMOL/L (ref 23–29)
CREAT SERPL-MCNC: 1.1 MG/DL (ref 0.5–1.4)
DIFFERENTIAL METHOD: ABNORMAL
EOSINOPHIL # BLD AUTO: 0.2 K/UL (ref 0–0.5)
EOSINOPHIL NFR BLD: 2.1 % (ref 0–8)
ERYTHROCYTE [DISTWIDTH] IN BLOOD BY AUTOMATED COUNT: 13.4 % (ref 11.5–14.5)
EST. GFR  (NO RACE VARIABLE): >60 ML/MIN/1.73 M^2
FERRITIN SERPL-MCNC: 141 NG/ML (ref 20–300)
GLUCOSE SERPL-MCNC: 98 MG/DL (ref 70–110)
HCT VFR BLD AUTO: 44.5 % (ref 40–54)
HGB BLD-MCNC: 14.8 G/DL (ref 14–18)
IMM GRANULOCYTES # BLD AUTO: 0.05 K/UL (ref 0–0.04)
IMM GRANULOCYTES NFR BLD AUTO: 0.4 % (ref 0–0.5)
IRON SERPL-MCNC: 82 UG/DL (ref 45–160)
LYMPHOCYTES # BLD AUTO: 1.2 K/UL (ref 1–4.8)
LYMPHOCYTES NFR BLD: 10.4 % (ref 18–48)
MCH RBC QN AUTO: 27 PG (ref 27–31)
MCHC RBC AUTO-ENTMCNC: 33.3 G/DL (ref 32–36)
MCV RBC AUTO: 81 FL (ref 82–98)
MONOCYTES # BLD AUTO: 1.1 K/UL (ref 0.3–1)
MONOCYTES NFR BLD: 9 % (ref 4–15)
NEUTROPHILS # BLD AUTO: 9 K/UL (ref 1.8–7.7)
NEUTROPHILS NFR BLD: 77.6 % (ref 38–73)
NRBC BLD-RTO: 0 /100 WBC
PLATELET # BLD AUTO: 341 K/UL (ref 150–450)
PMV BLD AUTO: 9.6 FL (ref 9.2–12.9)
POTASSIUM SERPL-SCNC: 4 MMOL/L (ref 3.5–5.1)
PROT SERPL-MCNC: 7.8 G/DL (ref 6–8.4)
RBC # BLD AUTO: 5.48 M/UL (ref 4.6–6.2)
SATURATED IRON: 23 % (ref 20–50)
SODIUM SERPL-SCNC: 140 MMOL/L (ref 136–145)
TOTAL IRON BINDING CAPACITY: 360 UG/DL (ref 250–450)
TRANSFERRIN SERPL-MCNC: 243 MG/DL (ref 200–375)
WBC # BLD AUTO: 11.65 K/UL (ref 3.9–12.7)

## 2023-05-02 PROCEDURE — 85025 COMPLETE CBC W/AUTO DIFF WBC: CPT | Performed by: PSYCHIATRY & NEUROLOGY

## 2023-05-02 PROCEDURE — 99215 PR OFFICE/OUTPT VISIT, EST, LEVL V, 40-54 MIN: ICD-10-PCS | Mod: S$GLB,,, | Performed by: PSYCHIATRY & NEUROLOGY

## 2023-05-02 PROCEDURE — 3008F BODY MASS INDEX DOCD: CPT | Mod: CPTII,S$GLB,, | Performed by: PSYCHIATRY & NEUROLOGY

## 2023-05-02 PROCEDURE — 99214 OFFICE O/P EST MOD 30 MIN: CPT | Mod: S$GLB,,, | Performed by: NEUROLOGICAL SURGERY

## 2023-05-02 PROCEDURE — 1159F PR MEDICATION LIST DOCUMENTED IN MEDICAL RECORD: ICD-10-PCS | Mod: CPTII,S$GLB,, | Performed by: PSYCHIATRY & NEUROLOGY

## 2023-05-02 PROCEDURE — 3074F SYST BP LT 130 MM HG: CPT | Mod: CPTII,S$GLB,, | Performed by: PSYCHIATRY & NEUROLOGY

## 2023-05-02 PROCEDURE — 99215 OFFICE O/P EST HI 40 MIN: CPT | Mod: S$GLB,,, | Performed by: PSYCHIATRY & NEUROLOGY

## 2023-05-02 PROCEDURE — 3075F SYST BP GE 130 - 139MM HG: CPT | Mod: CPTII,S$GLB,, | Performed by: NEUROLOGICAL SURGERY

## 2023-05-02 PROCEDURE — 99999 PR PBB SHADOW E&M-EST. PATIENT-LVL III: CPT | Mod: PBBFAC,,, | Performed by: PSYCHIATRY & NEUROLOGY

## 2023-05-02 PROCEDURE — 3074F PR MOST RECENT SYSTOLIC BLOOD PRESSURE < 130 MM HG: ICD-10-PCS | Mod: CPTII,S$GLB,, | Performed by: PSYCHIATRY & NEUROLOGY

## 2023-05-02 PROCEDURE — 1159F MED LIST DOCD IN RCRD: CPT | Mod: CPTII,S$GLB,, | Performed by: PSYCHIATRY & NEUROLOGY

## 2023-05-02 PROCEDURE — 3079F PR MOST RECENT DIASTOLIC BLOOD PRESSURE 80-89 MM HG: ICD-10-PCS | Mod: CPTII,S$GLB,, | Performed by: NEUROLOGICAL SURGERY

## 2023-05-02 PROCEDURE — 3079F DIAST BP 80-89 MM HG: CPT | Mod: CPTII,S$GLB,, | Performed by: NEUROLOGICAL SURGERY

## 2023-05-02 PROCEDURE — 80053 COMPREHEN METABOLIC PANEL: CPT | Performed by: PSYCHIATRY & NEUROLOGY

## 2023-05-02 PROCEDURE — 99999 PR PBB SHADOW E&M-EST. PATIENT-LVL III: CPT | Mod: PBBFAC,,, | Performed by: NEUROLOGICAL SURGERY

## 2023-05-02 PROCEDURE — 99999 PR PBB SHADOW E&M-EST. PATIENT-LVL III: ICD-10-PCS | Mod: PBBFAC,,, | Performed by: NEUROLOGICAL SURGERY

## 2023-05-02 PROCEDURE — 1160F RVW MEDS BY RX/DR IN RCRD: CPT | Mod: CPTII,S$GLB,, | Performed by: NEUROLOGICAL SURGERY

## 2023-05-02 PROCEDURE — 1160F PR REVIEW ALL MEDS BY PRESCRIBER/CLIN PHARMACIST DOCUMENTED: ICD-10-PCS | Mod: CPTII,S$GLB,, | Performed by: NEUROLOGICAL SURGERY

## 2023-05-02 PROCEDURE — 3079F PR MOST RECENT DIASTOLIC BLOOD PRESSURE 80-89 MM HG: ICD-10-PCS | Mod: CPTII,S$GLB,, | Performed by: PSYCHIATRY & NEUROLOGY

## 2023-05-02 PROCEDURE — 84466 ASSAY OF TRANSFERRIN: CPT | Performed by: PSYCHIATRY & NEUROLOGY

## 2023-05-02 PROCEDURE — 82728 ASSAY OF FERRITIN: CPT | Performed by: PSYCHIATRY & NEUROLOGY

## 2023-05-02 PROCEDURE — 3008F PR BODY MASS INDEX (BMI) DOCUMENTED: ICD-10-PCS | Mod: CPTII,S$GLB,, | Performed by: PSYCHIATRY & NEUROLOGY

## 2023-05-02 PROCEDURE — 1159F PR MEDICATION LIST DOCUMENTED IN MEDICAL RECORD: ICD-10-PCS | Mod: CPTII,S$GLB,, | Performed by: NEUROLOGICAL SURGERY

## 2023-05-02 PROCEDURE — 3075F PR MOST RECENT SYSTOLIC BLOOD PRESS GE 130-139MM HG: ICD-10-PCS | Mod: CPTII,S$GLB,, | Performed by: NEUROLOGICAL SURGERY

## 2023-05-02 PROCEDURE — 1159F MED LIST DOCD IN RCRD: CPT | Mod: CPTII,S$GLB,, | Performed by: NEUROLOGICAL SURGERY

## 2023-05-02 PROCEDURE — 36415 COLL VENOUS BLD VENIPUNCTURE: CPT | Performed by: PSYCHIATRY & NEUROLOGY

## 2023-05-02 PROCEDURE — 99214 PR OFFICE/OUTPT VISIT, EST, LEVL IV, 30-39 MIN: ICD-10-PCS | Mod: S$GLB,,, | Performed by: NEUROLOGICAL SURGERY

## 2023-05-02 PROCEDURE — 99999 PR PBB SHADOW E&M-EST. PATIENT-LVL III: ICD-10-PCS | Mod: PBBFAC,,, | Performed by: PSYCHIATRY & NEUROLOGY

## 2023-05-02 PROCEDURE — 3079F DIAST BP 80-89 MM HG: CPT | Mod: CPTII,S$GLB,, | Performed by: PSYCHIATRY & NEUROLOGY

## 2023-05-02 NOTE — PROGRESS NOTES
Subjective:   I, Bonnie Ly, attest that this documentation has been prepared under the direction and in the presence of Wellington Park MD.     Patient ID: Marvin Pool is a 47 y.o. male     Chief Complaint: No chief complaint on file.      HPI  Mr. Marvin Pool is a 47 y.o. gentleman s/p left frontal craniotomy for tumor done on 1/12/2023, who presents today for 3 month follow up with MRI brain. This is a patient who presented to another hospital after having a seizure and was found to have a left frontal brain tumor. Functional MRI confirming left hemispheric language dominance, with dorsal lateral prefrontal cortex and language SMA in close proximity to the lesion. We performed preoperative mapping using the functional MRI and DTI using [employer] software. This tumor involved a small part of the motor cortex and the premotor cortex on the left. Given these findings we planned for a limited resection. At the time of our last visit, the pt reports he is doing well in the interim. He is active at baseline and maintains his energy levels daily. He reports no seizures.     Today the pt reports he continues to do well in the interim. Pt with no new complaints or concerns. He continues to maintain his energy levels without complication.    Review of Systems   Constitutional:  Negative for activity change, appetite change, fatigue, fever and unexpected weight change.   HENT:  Negative for facial swelling.    Eyes: Negative.    Respiratory: Negative.     Cardiovascular: Negative.    Gastrointestinal:  Negative for diarrhea, nausea and vomiting.   Endocrine: Negative.    Genitourinary: Negative.    Musculoskeletal:  Negative for back pain, joint swelling, myalgias and neck pain.   Neurological:  Negative for dizziness, seizures, weakness, numbness and headaches.   Psychiatric/Behavioral: Negative.        Past Medical History:   Diagnosis Date    Brain tumor 1/12/2023    Seizures 1/12/2023       Objective:       Vitals:    05/02/23 1120   BP: 136/83   Pulse: 96   Temp: 97.3 °F (36.3 °C)      Physical Exam  Constitutional:       General: He is not in acute distress.     Appearance: Normal appearance.   HENT:      Head: Normocephalic and atraumatic.   Pulmonary:      Effort: Pulmonary effort is normal.   Musculoskeletal:      Cervical back: Neck supple.   Neurological:      Mental Status: He is alert and oriented to person, place, and time.      GCS: GCS eye subscore is 4. GCS verbal subscore is 5. GCS motor subscore is 6.      Cranial Nerves: No cranial nerve deficit.       IMAGING:  MRI Brain W WO Contrast (4/27/2023):  Expected immediate postsurgical changes of left frontal craniotomy for frontal mass resection.    I have personally reviewed the images with the pt.      I, Dr. Wellington Park, personally performed the services described in this documentation. All medical record entries made by the scribe, Bonnie Ly, were at my direction and in my presence.  I have reviewed the chart and agree that the record reflects my personal performance and is accurate and complete. Wellington Park MD. 05/02/2023    Assessment:       Low grade glioma.      Plan:   I have personally reviewed the MRI brain with the pt which shows expected immediate postsurgical changes of left frontal craniotomy for frontal mass resection.    I will schedule the patient for 3 month follow up with MRI brain.

## 2023-05-02 NOTE — PATIENT INSTRUCTIONS
I have personally reviewed the MRI brain with the pt which shows expected immediate postsurgical changes of left frontal craniotomy for frontal mass resection.    I will schedule the patient for 3 month follow up with MRI brain.

## 2023-05-02 NOTE — PROGRESS NOTES
Subjective:       Patient ID: Marvin Pool is a 47 y.o. male.    Chief Complaint: Brain tumor    HPI  Oncologic History  2020: In restrospect, started taking BC powder/having comprehension/speech issues  1/2023: had a seizure in his sleep; when recalling it to family, thought it was sleep paralysis. While out to lunch shortly after with family, had a seizure at the restaurant, then another witnessed by EMS. MRI brain at OSH showed left frontal mass. Transferred to [facility] for higher level of care.  1/12/2023: subtotal resection ([facility]Prk) with pathology consistent with oligodendroglioma, 1p/19q co-deleted, WHO grade 2  3/3/2023-4/3/2023: chemoradiation in [place], [place] (Prieto Carson)    Tolerated chemoradiation without issue. MRI brain from last week is stable from prior. No new symptoms. Has been resting the past month.    Past Medical History:   Diagnosis Date    Brain tumor 1/12/2023    Seizures 1/12/2023      Current Outpatient Medications   Medication Sig Dispense Refill    acetaminophen (TYLENOL) 500 MG tablet Take 1,000 mg by mouth every 6 (six) hours as needed for Pain.      dexAMETHasone (DECADRON) 2 MG tablet Take 2 tablets (4mg) every 6 hours for 3 days, then take 2 tablet (4mg) every 8 hours for 3 days, then take 2 tablets (4mg) every 12 hours for 3 days, then take 1 tablet (2mg) every 8 hours for 3 days, then take 1 tablet (2mg) every 12 hours for 3 days, then take 1 tablet (2mg) daily for 6 days, then OFF.Tablets: 75 75 tablet 0    KLONOPIN 0.5 mg tablet Take 0.5 mg by mouth daily as needed.      levETIRAcetam (KEPPRA) 500 MG Tab Take 500 mg by mouth 2 (two) times daily.      omeprazole (PRILOSEC) 40 MG capsule Take 40 mg by mouth every morning.      ondansetron (ZOFRAN) 8 MG tablet Take 1 tablet (8 mg total) by mouth As instructed for Nausea (TAKE 1 TABLET BY MOUTH 30-45 MINUTES PRIOR TO CHEMOTHERAPY DOSE AND EVERY 8 HOURS AS NEEDED). 30 tablet 3    oxyCODONE-acetaminophen (PERCOCET) 5-325 mg  per tablet Take 1 tablet by mouth every 4 (four) hours as needed for Pain. 56 tablet 0    traZODone (DESYREL) 50 MG tablet Take 50 mg by mouth every evening.      zolpidem (AMBIEN) 10 mg Tab Take 10 mg by mouth nightly as needed.       No current facility-administered medications for this visit.      Past Surgical History:   Procedure Laterality Date    CRANIOTOMY USING FRAMELESS STEREOTAXY Left 1/12/2023    Procedure: CRANIOTOMY, USING FRAMELESS STEREOTAXY;  Surgeon: Wellington Park MD;  Location: [facility] OR [address] [place] [address];  Service: Neurosurgery;  Laterality: Left;      No family history on file.       Review of Systems   Constitutional:  Negative for unexpected weight change.   Eyes:  Negative for visual disturbance.   Respiratory:  Negative for shortness of breath.    Cardiovascular:  Negative for leg swelling.   Gastrointestinal:  Negative for abdominal distention.   Musculoskeletal:  Negative for gait problem.   Neurological:  Negative for seizures and weakness.   Psychiatric/Behavioral:  Positive for agitation and sleep disturbance.    KPS: 100      Objective:      There were no vitals filed for this visit.      NEUROLOGICAL EXAMINATION:     MENTAL STATUS   Attention: normal. Concentration: normal.   Speech: speech is normal   Level of consciousness: alert  Knowledge: good.     CRANIAL NERVES   Cranial nerves II through XII intact.     MOTOR EXAM     Strength   Strength 5/5 throughout.     REFLEXES     Reflexes   Right brachioradialis: 2+  Left brachioradialis: 2+  Right biceps: 2+  Left biceps: 2+  Right patellar: 2+  Left patellar: 2+  Right achilles: 2+  Left achilles: 2+    SENSORY EXAM   Light touch normal.     GAIT AND COORDINATION     Gait  Gait: normal     Coordination   Finger to nose coordination: normal     MRI brain from 4/27/2023 was personally visualized and compared to prior. This study shows expected post-operative changes from STR.    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 Tumor diagnosis was heralded by seizures. Current symptoms include none.    Oligodendroglioma  Completed chemoradiation. Labs are adequate for initiation of adjuvant TMZ C1 @150mg/m2/night days 1-5/28. F/u in 4 weeks with CBC/CMP in anticipation of C2 adjuvant TMZ.    Seizures  Continue keppra.           40 minutes of total time spent on the encounter, which includes face to face time and non-face to face time preparing to see the patient (eg, review of tests), Obtaining and/or reviewing separately obtained history, Documenting clinical information in the electronic or other health record, Independently interpreting results (not separately reported) and communicating results to the patient/family/caregiver, or Care coordination (not separately reported).           Alyse Roman MD  Neuro-Oncology, Movement Disorders

## 2023-05-03 ENCOUNTER — TELEPHONE (OUTPATIENT)
Dept: PHARMACY | Facility: CLINIC | Age: 48
End: 2023-05-03
Payer: COMMERCIAL

## 2023-05-03 RX ORDER — TEMOZOLOMIDE 5 MG/1
5 CAPSULE ORAL DAILY
Qty: 5 CAPSULE | Refills: 0 | Status: ACTIVE | OUTPATIENT
Start: 2023-05-03 | End: 2023-06-02

## 2023-05-03 RX ORDER — TEMOZOLOMIDE 20 MG/1
40 CAPSULE ORAL DAILY
Qty: 10 CAPSULE | Refills: 0 | Status: ACTIVE | OUTPATIENT
Start: 2023-05-03 | End: 2023-06-02

## 2023-05-03 RX ORDER — TEMOZOLOMIDE 250 MG/1
250 CAPSULE ORAL DAILY
Qty: 5 CAPSULE | Refills: 0 | Status: ACTIVE | OUTPATIENT
Start: 2023-05-03 | End: 2023-06-02

## 2023-05-03 NOTE — TELEPHONE ENCOUNTER
Notified pt that Temodar rx sent to [facility]. Pt has contact for [facility]. Voiced understanding.     [facility] - [address] [address]   Phone: [phone] Fax: [phone]

## 2023-05-05 ENCOUNTER — PATIENT MESSAGE (OUTPATIENT)
Dept: NEUROLOGY | Facility: CLINIC | Age: 48
End: 2023-05-05
Payer: COMMERCIAL

## 2023-05-05 ENCOUNTER — TELEPHONE (OUTPATIENT)
Dept: NEUROSURGERY | Facility: CLINIC | Age: 48
End: 2023-05-05
Payer: COMMERCIAL

## 2023-05-05 ENCOUNTER — TELEPHONE (OUTPATIENT)
Dept: HEMATOLOGY/ONCOLOGY | Facility: CLINIC | Age: 48
End: 2023-05-05
Payer: COMMERCIAL

## 2023-05-05 NOTE — TELEPHONE ENCOUNTER
Called [facility] ([phone]) to f/u regarding TMZ rx.     Representative, Claudia, states the pharmacy is currently working on this medication for approval with insurance. Will likely notify Pt early next week status of medication and schedule delivery. At this time, no further action required.

## 2023-05-05 NOTE — TELEPHONE ENCOUNTER
Returned phone call from [facility]. Nathalie ArmijoD requesting clarification on dosage of Temodar.     Verified dosages to be dispensed as :     temozolomide (TEMODAR) 250 MG capsule   temozolomide (TEMODAR) 20 MG capsule   temozolomide (TEMODAR) 5 MG capsule      For a total of 295 mg to be taken for days 1-5 of 28 day cycle.     Clarified with Pt as well. VU. No further questions/concerns at this time.

## 2023-05-05 NOTE — TELEPHONE ENCOUNTER
----- Message from Diony Dangelo MA sent at 5/5/2023  3:13 PM CDT -----  Regarding: FW: Confirmation Pharmacy Request  Contact: Phone [phone]    ----- Message -----  From: Zelda Ernandez  Sent: 5/5/2023   2:59 PM CDT  To: Alyse Roman Staff  Subject: Confirmation Pharmacy Request                    Pt's pharmacist Catrachita keith is calling to confirm 295 dosage. Pt is refusing to request delivery for anything but 250mg dosage. Please call and advise    temozolomide (TEMODAR) 250 MG capsule   temozolomide (TEMODAR) 20 MG capsule   temozolomide (TEMODAR) 5 MG capsule       Phone [phone]  Fax [phone]

## 2023-05-11 ENCOUNTER — OFFICE VISIT (OUTPATIENT)
Dept: HEMATOLOGY/ONCOLOGY | Facility: CLINIC | Age: 48
End: 2023-05-11
Payer: COMMERCIAL

## 2023-05-11 VITALS
HEART RATE: 71 BPM | HEIGHT: 70 IN | BODY MASS INDEX: 24.86 KG/M2 | SYSTOLIC BLOOD PRESSURE: 119 MMHG | DIASTOLIC BLOOD PRESSURE: 77 MMHG | WEIGHT: 173.69 LBS | OXYGEN SATURATION: 95 %

## 2023-05-11 DIAGNOSIS — C71.1 MALIGNANT NEOPLASM OF FRONTAL LOBE: Primary | Chronic | ICD-10-CM

## 2023-05-11 DIAGNOSIS — C71.1 MALIGNANT NEOPLASM OF FRONTAL LOBE: Primary | ICD-10-CM

## 2023-05-11 LAB
ALBUMIN SERPL BCP-MCNC: 3.8 G/DL (ref 3.4–5)
ALBUMIN/GLOBULIN RATIO: 1.12 RATIO (ref 1.1–1.8)
ALP SERPL-CCNC: 79 U/L (ref 46–116)
ALT SERPL W P-5'-P-CCNC: 49 U/L (ref 12–78)
ANION GAP SERPL CALC-SCNC: 8 MMOL/L (ref 3–11)
AST SERPL-CCNC: 38 U/L (ref 15–37)
BASOPHILS NFR BLD: 0.6 % (ref 0–3)
BILIRUB SERPL-MCNC: 0.4 MG/DL (ref 0–1)
BUN SERPL-MCNC: 13 MG/DL (ref 7–18)
BUN/CREAT SERPL: 12.87 RATIO (ref 7–18)
CALCIUM SERPL-MCNC: 9.1 MG/DL (ref 8.8–10.5)
CHLORIDE SERPL-SCNC: 103 MMOL/L (ref 100–108)
CO2 SERPL-SCNC: 27 MMOL/L (ref 21–32)
CREAT SERPL-MCNC: 1.01 MG/DL (ref 0.7–1.3)
EOSINOPHIL NFR BLD: 7.2 % (ref 1–3)
ERYTHROCYTE [DISTWIDTH] IN BLOOD BY AUTOMATED COUNT: 13.8 % (ref 12.5–18)
GFR ESTIMATION: > 60
GLOBULIN: 3.4 G/DL (ref 2.3–3.5)
GLUCOSE SERPL-MCNC: 81 MG/DL (ref 70–110)
HCT VFR BLD AUTO: 40.8 % (ref 42–52)
HGB BLD-MCNC: 14 G/DL (ref 14–18)
LYMPHOCYTES NFR BLD: 13.8 % (ref 25–40)
MCH RBC QN AUTO: 27.6 PG (ref 27–31.2)
MCHC RBC AUTO-ENTMCNC: 34.3 G/DL (ref 31.8–35.4)
MCV RBC AUTO: 80.5 FL (ref 80–97)
MONOCYTES NFR BLD: 13.7 % (ref 1–15)
NEUTROPHILS # BLD AUTO: 4.38 10*3/UL (ref 1.8–7.7)
NEUTROPHILS NFR BLD: 64.4 % (ref 37–80)
NUCLEATED RED BLOOD CELLS: 0 %
PLATELETS: 307 10*3/UL (ref 142–424)
POTASSIUM SERPL-SCNC: 3.6 MMOL/L (ref 3.6–5.2)
PROT SERPL-MCNC: 7.2 G/DL (ref 6.4–8.2)
RBC # BLD AUTO: 5.07 10*6/UL (ref 4.7–6.1)
SODIUM BLD-SCNC: 138 MMOL/L (ref 135–145)
WBC # BLD: 6.8 10*3/UL (ref 4.6–10.2)

## 2023-05-11 PROCEDURE — 3074F SYST BP LT 130 MM HG: CPT | Mod: CPTII,S$GLB,, | Performed by: INTERNAL MEDICINE

## 2023-05-11 PROCEDURE — 99205 PR OFFICE/OUTPT VISIT, NEW, LEVL V, 60-74 MIN: ICD-10-PCS | Mod: S$GLB,,, | Performed by: INTERNAL MEDICINE

## 2023-05-11 PROCEDURE — 1159F MED LIST DOCD IN RCRD: CPT | Mod: CPTII,S$GLB,, | Performed by: INTERNAL MEDICINE

## 2023-05-11 PROCEDURE — 3074F PR MOST RECENT SYSTOLIC BLOOD PRESSURE < 130 MM HG: ICD-10-PCS | Mod: CPTII,S$GLB,, | Performed by: INTERNAL MEDICINE

## 2023-05-11 PROCEDURE — 3008F PR BODY MASS INDEX (BMI) DOCUMENTED: ICD-10-PCS | Mod: CPTII,S$GLB,, | Performed by: INTERNAL MEDICINE

## 2023-05-11 PROCEDURE — 3008F BODY MASS INDEX DOCD: CPT | Mod: CPTII,S$GLB,, | Performed by: INTERNAL MEDICINE

## 2023-05-11 PROCEDURE — 3078F PR MOST RECENT DIASTOLIC BLOOD PRESSURE < 80 MM HG: ICD-10-PCS | Mod: CPTII,S$GLB,, | Performed by: INTERNAL MEDICINE

## 2023-05-11 PROCEDURE — 1159F PR MEDICATION LIST DOCUMENTED IN MEDICAL RECORD: ICD-10-PCS | Mod: CPTII,S$GLB,, | Performed by: INTERNAL MEDICINE

## 2023-05-11 PROCEDURE — 3078F DIAST BP <80 MM HG: CPT | Mod: CPTII,S$GLB,, | Performed by: INTERNAL MEDICINE

## 2023-05-11 PROCEDURE — 99205 OFFICE O/P NEW HI 60 MIN: CPT | Mod: S$GLB,,, | Performed by: INTERNAL MEDICINE

## 2023-05-11 RX ORDER — PROMETHAZINE HYDROCHLORIDE 25 MG/1
25 TABLET ORAL EVERY 6 HOURS PRN
COMMUNITY
Start: 2023-04-21

## 2023-05-11 RX ORDER — SULFAMETHOXAZOLE AND TRIMETHOPRIM 800; 160 MG/1; MG/1
1 TABLET ORAL DAILY
Qty: 30 TABLET | Refills: 6 | Status: SHIPPED | OUTPATIENT
Start: 2023-05-11 | End: 2023-11-27

## 2023-05-11 RX ORDER — TEMOZOLOMIDE 140 MG/1
140 CAPSULE ORAL
COMMUNITY
Start: 2023-03-17 | End: 2023-06-02

## 2023-05-11 NOTE — PROGRESS NOTES
MEDICAL ONCOLOGY INITIAL CONSULTATION NOTE    Patient ID: Marvin Pool is a 47 y.o. male.    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  He also completed chemo XRT and now is currently on adjuvant Temodar.  He would like to establish care here locally in [place] and voices no acute complaints reporting that he is tolerated Temodar without any side effects at this time.  He presents to our clinic today for further evaluation        Imaging:       MRI brain:  01/12/23    Expected immediate postsurgical changes of left frontal craniotomy for frontal mass resection.     Persistent area of T2 FLAIR hyperintense signal surrounding the resection is similar size compared to preoperative MRI. Typical thin peripheral enhancement around the resection cavity.        Past Medical History:   Diagnosis Date    Brain tumor 1/12/2023    Seizures 1/12/2023     Family History   Problem Relation Age of Onset    No Known Problems Mother     No Known Problems Father     No Known Problems Sister     No Known Problems Brother     No Known Problems Maternal Aunt      Social History     Socioeconomic History    Marital status:    Tobacco Use    Smoking status: Never    Smokeless tobacco: Never   Substance and Sexual Activity    Alcohol use: Not Currently     Past Surgical History:   Procedure Laterality Date    CRANIOTOMY USING FRAMELESS STEREOTAXY Left 1/12/2023    Procedure: CRANIOTOMY, USING FRAMELESS STEREOTAXY;  Surgeon: Wellington Park MD;  Location: [facility] OR [address] [place] [address];  Service: Neurosurgery;  Laterality: Left;         Review of systems:  Review of Systems   Constitutional:  Negative for activity change, appetite change, chills, diaphoresis, fatigue and unexpected weight change.   HENT:  Negative for congestion, facial swelling, hearing loss, mouth  sores, trouble swallowing and voice change.    Eyes:  Negative for photophobia, pain, discharge and itching.   Respiratory:  Negative for apnea, cough, choking, chest tightness and shortness of breath.    Cardiovascular:  Negative for chest pain, palpitations and leg swelling.   Gastrointestinal:  Negative for abdominal distention, abdominal pain, anal bleeding and blood in stool.   Endocrine: Negative for cold intolerance, heat intolerance, polydipsia and polyphagia.   Genitourinary:  Negative for difficulty urinating, dysuria, flank pain and hematuria.   Musculoskeletal:  Negative for arthralgias, back pain, joint swelling, myalgias, neck pain and neck stiffness.   Skin:  Negative for color change, pallor and wound.   Allergic/Immunologic: Negative for environmental allergies, food allergies and immunocompromised state.   Neurological:  Negative for dizziness, seizures, facial asymmetry, speech difficulty, light-headedness, numbness and headaches.   Hematological:  Negative for adenopathy. Does not bruise/bleed easily.   Psychiatric/Behavioral:  Negative for agitation, behavioral problems, confusion, decreased concentration and sleep disturbance.          Physical Exam  Vitals and nursing note reviewed.   Constitutional:       General: He is not in acute distress.     Appearance: Normal appearance. He is not ill-appearing.   HENT:      Head: Normocephalic and atraumatic.      Nose: No congestion or rhinorrhea.   Eyes:      General: No scleral icterus.     Extraocular Movements: Extraocular movements intact.      Pupils: Pupils are equal, round, and reactive to light.   Cardiovascular:      Rate and Rhythm: Normal rate and regular rhythm.      Pulses: Normal pulses.      Heart sounds: Normal heart sounds. No murmur heard.    No gallop.   Pulmonary:      Effort: Pulmonary effort is normal. No respiratory distress.      Breath sounds: Normal breath sounds. No stridor. No wheezing or rhonchi.   Abdominal:       General: Bowel sounds are normal. There is no distension.      Palpations: There is no mass.      Tenderness: There is no abdominal tenderness. There is no guarding.   Musculoskeletal:         General: No swelling, tenderness, deformity or signs of injury. Normal range of motion.      Cervical back: Normal range of motion and neck supple. No rigidity. No muscular tenderness.      Right lower leg: No edema.      Left lower leg: No edema.   Skin:     General: Skin is warm.      Coloration: Skin is not jaundiced or pale.      Findings: No bruising or lesion.   Neurological:      General: No focal deficit present.      Mental Status: He is alert and oriented to person, place, and time.      Cranial Nerves: No cranial nerve deficit.      Sensory: No sensory deficit.      Motor: No weakness.      Gait: Gait normal.   Psychiatric:         Mood and Affect: Mood normal.         Behavior: Behavior normal.         Thought Content: Thought content normal.               Vitals:    05/11/23 1356   BP: 119/77   Pulse: 71   Body surface area is 1.97 meters squared.    Assessment/Plan:      ECOG 1    Glioma :    == Grade 2 glioma of the left frontal lobe status post subtotal resection in January of 2023.   == s/p Chemo-XRT completed recently and now on adjuvant Temodar tolerating well.  == I will add Bactrim DS for PCP prohylaxis at this time and see patient prior to C#2      Plan:  Continue adjuvant  Temodar  Bactrim DS for PCP prophylaxis      RTC in 4 weeks for MD visit with CBC with diff, CMP prior           Total time spent in counseling and discussion about further management options including relevant lab work, treatment,  prognosis, medications and intended side effects was more than 60 minutes. More than 50% of the time was spent on counseling and coordination of care.  I spent a total of 60 minutes on the day of the visit.This includes face to face time and non-face to face time preparing to see the patient (eg, review of  tests), Obtaining and/or reviewing separately obtained history, Documenting clinical information in the electronic or other health record, Independently interpreting resultsand communicating results to the patient/family/caregiver, or Care coordination.

## 2023-06-01 ENCOUNTER — OFFICE VISIT (OUTPATIENT)
Dept: NEUROLOGY | Facility: CLINIC | Age: 48
End: 2023-06-01
Payer: COMMERCIAL

## 2023-06-01 ENCOUNTER — TELEPHONE (OUTPATIENT)
Dept: NEUROLOGY | Facility: CLINIC | Age: 48
End: 2023-06-01
Payer: COMMERCIAL

## 2023-06-01 ENCOUNTER — PATIENT MESSAGE (OUTPATIENT)
Dept: NEUROLOGY | Facility: CLINIC | Age: 48
End: 2023-06-01
Payer: COMMERCIAL

## 2023-06-01 DIAGNOSIS — C71.9 GLIOMA: ICD-10-CM

## 2023-06-01 DIAGNOSIS — D49.6 BRAIN TUMOR: ICD-10-CM

## 2023-06-01 DIAGNOSIS — R56.9 SEIZURES: Chronic | ICD-10-CM

## 2023-06-01 DIAGNOSIS — C71.1 MALIGNANT NEOPLASM OF FRONTAL LOBE: Primary | Chronic | ICD-10-CM

## 2023-06-01 DIAGNOSIS — Z51.11 ENCOUNTER FOR CHEMOTHERAPY MANAGEMENT: Primary | ICD-10-CM

## 2023-06-01 PROCEDURE — 99214 OFFICE O/P EST MOD 30 MIN: CPT | Mod: 95,,, | Performed by: PSYCHIATRY & NEUROLOGY

## 2023-06-01 PROCEDURE — 99214 PR OFFICE/OUTPT VISIT, EST, LEVL IV, 30-39 MIN: ICD-10-PCS | Mod: 95,,, | Performed by: PSYCHIATRY & NEUROLOGY

## 2023-06-01 NOTE — PROGRESS NOTES
Subjective:       Patient ID: Marvin Pool is a 47 y.o. male.    Chief Complaint: Brain tumor    HPI  Oncologic History  2020: In restrospect, started taking BC powder/having comprehension/speech issues  1/2023: had a seizure in his sleep; when recalling it to family, thought it was sleep paralysis. While out to lunch shortly after with family, had a seizure at the restaurant, then another witnessed by EMS. MRI brain at OSH showed left frontal mass. Transferred to [facility] for higher level of care.  1/12/2023: subtotal resection (Wellington Park) with pathology consistent with oligodendroglioma, 1p/19q co-deleted, WHO grade 2  3/3/2023-4/3/2023: chemoradiation in [place], [place] (Prieto Carson)  5/2023: C1 TMZ @150mg/m2    Marvin is doing very well. He has returned to work but is still taking it easy. No issues with nausea/vomiting/constipation with C1.    Past Medical History:   Diagnosis Date    Brain tumor 1/12/2023    Seizures 1/12/2023      Current Outpatient Medications   Medication Sig Dispense Refill    acetaminophen (TYLENOL) 500 MG tablet Take 1,000 mg by mouth every 6 (six) hours as needed for Pain.      dexAMETHasone (DECADRON) 2 MG tablet Take 2 tablets (4mg) every 6 hours for 3 days, then take 2 tablet (4mg) every 8 hours for 3 days, then take 2 tablets (4mg) every 12 hours for 3 days, then take 1 tablet (2mg) every 8 hours for 3 days, then take 1 tablet (2mg) every 12 hours for 3 days, then take 1 tablet (2mg) daily for 6 days, then OFF.Tablets: 75 (Patient not taking: Reported on 5/11/2023) 75 tablet 0    KLONOPIN 0.5 mg tablet Take 0.5 mg by mouth daily as needed.      levETIRAcetam (KEPPRA) 500 MG Tab Take 500 mg by mouth 2 (two) times daily.      omeprazole (PRILOSEC) 40 MG capsule Take 40 mg by mouth every morning.      ondansetron (ZOFRAN) 8 MG tablet Take 1 tablet (8 mg total) by mouth As instructed for Nausea (TAKE 1 TABLET BY MOUTH 30-45 MINUTES PRIOR TO CHEMOTHERAPY DOSE AND EVERY 8 HOURS AS  NEEDED). 30 tablet 3    promethazine (PHENERGAN) 25 MG tablet Take 25 mg by mouth every 6 (six) hours as needed.      sulfamethoxazole-trimethoprim 800-160mg (BACTRIM DS) 800-160 mg Tab Take 1 tablet by mouth once daily. 30 tablet 6    temozolomide (TEMODAR) 140 MG capsule Take 140 mg by mouth.      zolpidem (AMBIEN) 10 mg Tab Take 10 mg by mouth nightly as needed.       No current facility-administered medications for this visit.      Past Surgical History:   Procedure Laterality Date    CRANIOTOMY USING FRAMELESS STEREOTAXY Left 1/12/2023    Procedure: CRANIOTOMY, USING FRAMELESS STEREOTAXY;  Surgeon: Wellington Park MD;  Location: [facility] OR [address] [place] [address];  Service: Neurosurgery;  Laterality: Left;      Family History   Problem Relation Age of Onset    No Known Problems Mother     No Known Problems Father     No Known Problems Sister     No Known Problems Brother     No Known Problems Maternal Aunt       Social History     Tobacco Use    Smoking status: Never    Smokeless tobacco: Never   Substance Use Topics    Alcohol use: Not Currently      Review of Systems   Constitutional:  Negative for unexpected weight change.   Eyes:  Negative for visual disturbance.   Respiratory:  Negative for shortness of breath.    Cardiovascular:  Negative for leg swelling.   Gastrointestinal:  Negative for abdominal distention.   Musculoskeletal:  Negative for gait problem.   Neurological:  Negative for seizures and weakness.   Psychiatric/Behavioral:  Positive for agitation and sleep disturbance.    KPS: 100      Objective:      There were no vitals filed for this visit.      NEUROLOGICAL EXAMINATION:     MENTAL STATUS   Attention: normal. Concentration: normal.   Speech: speech is normal   Level of consciousness: alert  Knowledge: good.     CRANIAL NERVES   Cranial nerves II through XII intact.     MOTOR EXAM     Strength   Strength 5/5 throughout.     REFLEXES     Reflexes   Right brachioradialis: 2+  Left brachioradialis: 2+  Right  biceps: 2+  Left biceps: 2+  Right patellar: 2+  Left patellar: 2+  Right achilles: 2+  Left achilles: 2+    SENSORY EXAM   Light touch normal.     GAIT AND COORDINATION     Gait  Gait: normal     Coordination   Finger to nose coordination: normal     MRI brain from 4/27/2023 was personally visualized and compared to prior. This study shows expected post-operative changes from STR.    Assessment:       Problem List Items Addressed This Visit          Neuro    Seizures (Chronic)       Oncology    Low grade glioma of frontal lobe - Primary (Chronic)             Plan:       Marvin Pool is a 47 y.o. 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 Tumor diagnosis was heralded by seizures. Current symptoms include none.    Oligodendroglioma  Completed chemoradiation. Has not had labs drawn but will do so today. Labs reviewed; adequate for C2 TMZ @200mg/m2. F/u in 4 weeks with CBC/CMP in anticipation of C3 adjuvant TMZ. Next MRI brain in about 8 weeks.    Seizures  Continue keppra.           The patient location is: [place]  The chief complaint leading to consultation is: oligodendroglioma    Visit type: audiovisual    Face to Face time with patient: 5 minutes  30 minutes of total time spent on the encounter, which includes face to face time and non-face to face time preparing to see the patient (eg, review of tests), Obtaining and/or reviewing separately obtained history, Documenting clinical information in the electronic or other health record, Independently interpreting results (not separately reported) and communicating results to the patient/family/caregiver, or Care coordination (not separately reported).         Each patient to whom he or she provides medical services by telemedicine is:  (1) informed of the relationship between the physician and patient and the respective role of any other health care provider with respect to management of the  patient; and (2) notified that he or she may decline to receive medical services by telemedicine and may withdraw from such care at any time.    Notes:        Alyse Roman MD  Neuro-Oncology, Movement Disorders

## 2023-06-02 ENCOUNTER — PATIENT MESSAGE (OUTPATIENT)
Dept: NEUROLOGY | Facility: CLINIC | Age: 48
End: 2023-06-02
Payer: COMMERCIAL

## 2023-06-02 RX ORDER — TEMOZOLOMIDE 140 MG/1
140 CAPSULE ORAL DAILY
Qty: 5 CAPSULE | Refills: 0 | Status: ACTIVE | OUTPATIENT
Start: 2023-06-02 | End: 2023-07-11 | Stop reason: SDUPTHER

## 2023-06-02 RX ORDER — TEMOZOLOMIDE 250 MG/1
250 CAPSULE ORAL DAILY
Qty: 5 CAPSULE | Refills: 0 | Status: ACTIVE | OUTPATIENT
Start: 2023-06-02 | End: 2023-07-11 | Stop reason: SDUPTHER

## 2023-06-07 LAB
ALBUMIN SERPL BCP-MCNC: 4 G/DL (ref 3.4–5)
ALBUMIN/GLOBULIN RATIO: 1 RATIO (ref 1.1–1.8)
ALP SERPL-CCNC: 76 U/L (ref 46–116)
ALT SERPL W P-5'-P-CCNC: 53 U/L (ref 12–78)
ANION GAP SERPL CALC-SCNC: 7 MMOL/L (ref 3–11)
AST SERPL-CCNC: 36 U/L (ref 15–37)
BASOPHILS NFR BLD: 0.8 % (ref 0–3)
BILIRUB SERPL-MCNC: 0.4 MG/DL (ref 0–1)
BUN SERPL-MCNC: 12 MG/DL (ref 7–18)
BUN/CREAT SERPL: 10.61 RATIO (ref 7–18)
CALCIUM SERPL-MCNC: 9.1 MG/DL (ref 8.8–10.5)
CHLORIDE SERPL-SCNC: 101 MMOL/L (ref 100–108)
CO2 SERPL-SCNC: 29 MMOL/L (ref 21–32)
CREAT SERPL-MCNC: 1.13 MG/DL (ref 0.7–1.3)
EOSINOPHIL NFR BLD: 4.9 % (ref 1–3)
ERYTHROCYTE [DISTWIDTH] IN BLOOD BY AUTOMATED COUNT: 13.3 % (ref 12.5–18)
GFR ESTIMATION: > 60
GLOBULIN: 4 G/DL (ref 2.3–3.5)
GLUCOSE SERPL-MCNC: 93 MG/DL (ref 70–110)
HCT VFR BLD AUTO: 39.5 % (ref 42–52)
HGB BLD-MCNC: 13.8 G/DL (ref 14–18)
LYMPHOCYTES NFR BLD: 15.2 % (ref 25–40)
MCH RBC QN AUTO: 27.7 PG (ref 27–31.2)
MCHC RBC AUTO-ENTMCNC: 34.9 G/DL (ref 31.8–35.4)
MCV RBC AUTO: 79.3 FL (ref 80–97)
MONOCYTES NFR BLD: 9.6 % (ref 1–15)
NEUTROPHILS # BLD AUTO: 5.12 10*3/UL (ref 1.8–7.7)
NEUTROPHILS NFR BLD: 69.2 % (ref 37–80)
NUCLEATED RED BLOOD CELLS: 0 %
PLATELETS: 392 10*3/UL (ref 142–424)
POTASSIUM SERPL-SCNC: 3.6 MMOL/L (ref 3.6–5.2)
PROT SERPL-MCNC: 8 G/DL (ref 6.4–8.2)
RBC # BLD AUTO: 4.98 10*6/UL (ref 4.7–6.1)
SODIUM BLD-SCNC: 137 MMOL/L (ref 135–145)
WBC # BLD: 7.4 10*3/UL (ref 4.6–10.2)

## 2023-06-08 ENCOUNTER — OFFICE VISIT (OUTPATIENT)
Dept: HEMATOLOGY/ONCOLOGY | Facility: CLINIC | Age: 48
End: 2023-06-08
Payer: COMMERCIAL

## 2023-06-08 VITALS
OXYGEN SATURATION: 96 % | RESPIRATION RATE: 16 BRPM | DIASTOLIC BLOOD PRESSURE: 73 MMHG | HEIGHT: 70 IN | SYSTOLIC BLOOD PRESSURE: 110 MMHG | BODY MASS INDEX: 24.31 KG/M2 | HEART RATE: 69 BPM | WEIGHT: 169.81 LBS

## 2023-06-08 DIAGNOSIS — C71.1 MALIGNANT NEOPLASM OF FRONTAL LOBE: Primary | Chronic | ICD-10-CM

## 2023-06-08 PROCEDURE — 3008F PR BODY MASS INDEX (BMI) DOCUMENTED: ICD-10-PCS | Mod: CPTII,S$GLB,, | Performed by: INTERNAL MEDICINE

## 2023-06-08 PROCEDURE — 3074F SYST BP LT 130 MM HG: CPT | Mod: CPTII,S$GLB,, | Performed by: INTERNAL MEDICINE

## 2023-06-08 PROCEDURE — 99214 OFFICE O/P EST MOD 30 MIN: CPT | Mod: S$GLB,,, | Performed by: INTERNAL MEDICINE

## 2023-06-08 PROCEDURE — 3078F DIAST BP <80 MM HG: CPT | Mod: CPTII,S$GLB,, | Performed by: INTERNAL MEDICINE

## 2023-06-08 PROCEDURE — 3078F PR MOST RECENT DIASTOLIC BLOOD PRESSURE < 80 MM HG: ICD-10-PCS | Mod: CPTII,S$GLB,, | Performed by: INTERNAL MEDICINE

## 2023-06-08 PROCEDURE — 99214 PR OFFICE/OUTPT VISIT, EST, LEVL IV, 30-39 MIN: ICD-10-PCS | Mod: S$GLB,,, | Performed by: INTERNAL MEDICINE

## 2023-06-08 PROCEDURE — 3008F BODY MASS INDEX DOCD: CPT | Mod: CPTII,S$GLB,, | Performed by: INTERNAL MEDICINE

## 2023-06-08 PROCEDURE — 1159F PR MEDICATION LIST DOCUMENTED IN MEDICAL RECORD: ICD-10-PCS | Mod: CPTII,S$GLB,, | Performed by: INTERNAL MEDICINE

## 2023-06-08 PROCEDURE — 3074F PR MOST RECENT SYSTOLIC BLOOD PRESSURE < 130 MM HG: ICD-10-PCS | Mod: CPTII,S$GLB,, | Performed by: INTERNAL MEDICINE

## 2023-06-08 PROCEDURE — 1159F MED LIST DOCD IN RCRD: CPT | Mod: CPTII,S$GLB,, | Performed by: INTERNAL MEDICINE

## 2023-06-08 NOTE — PROGRESS NOTES
MEDICAL ONCOLOGY FOLLOW UP CONSULTATION NOTE    Patient ID: Marvin Pool is a 47 y.o. male.    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  He also completed chemo XRT and now is currently on adjuvant Temodar.  He would like to establish care here locally in [place] and voices no acute complaints reporting that he is tolerated Temodar without any side effects at this time.  He presents to our clinic today for further evaluation        Imaging:       MRI brain:  01/12/23    Expected immediate postsurgical changes of left frontal craniotomy for frontal mass resection.     Persistent area of T2 FLAIR hyperintense signal surrounding the resection is similar size compared to preoperative MRI. Typical thin peripheral enhancement around the resection cavity.        Past Medical History:   Diagnosis Date    Brain tumor 1/12/2023    Seizures 1/12/2023     Family History   Problem Relation Age of Onset    No Known Problems Mother     No Known Problems Father     No Known Problems Sister     No Known Problems Brother     No Known Problems Maternal Aunt      Social History     Socioeconomic History    Marital status:    Tobacco Use    Smoking status: Never    Smokeless tobacco: Never   Substance and Sexual Activity    Alcohol use: Not Currently     Past Surgical History:   Procedure Laterality Date    CRANIOTOMY USING FRAMELESS STEREOTAXY Left 1/12/2023    Procedure: CRANIOTOMY, USING FRAMELESS STEREOTAXY;  Surgeon: Wellington Park MD;  Location: [facility] OR [address] [place] [address];  Service: Neurosurgery;  Laterality: Left;         Review of systems:  Review of Systems   Constitutional:  Negative for activity change, appetite change, chills, diaphoresis, fatigue and unexpected weight change.   HENT:  Negative for congestion, facial swelling, hearing loss, mouth  sores, trouble swallowing and voice change.    Eyes:  Negative for photophobia, pain, discharge and itching.   Respiratory:  Negative for apnea, cough, choking, chest tightness and shortness of breath.    Cardiovascular:  Negative for chest pain, palpitations and leg swelling.   Gastrointestinal:  Negative for abdominal distention, abdominal pain, anal bleeding and blood in stool.   Endocrine: Negative for cold intolerance, heat intolerance, polydipsia and polyphagia.   Genitourinary:  Negative for difficulty urinating, dysuria, flank pain and hematuria.   Musculoskeletal:  Negative for arthralgias, back pain, joint swelling, myalgias, neck pain and neck stiffness.   Skin:  Negative for color change, pallor and wound.   Allergic/Immunologic: Negative for environmental allergies, food allergies and immunocompromised state.   Neurological:  Negative for dizziness, seizures, facial asymmetry, speech difficulty, light-headedness, numbness and headaches.   Hematological:  Negative for adenopathy. Does not bruise/bleed easily.   Psychiatric/Behavioral:  Negative for agitation, behavioral problems, confusion, decreased concentration and sleep disturbance.          Physical Exam  Vitals and nursing note reviewed.   Constitutional:       General: He is not in acute distress.     Appearance: Normal appearance. He is not ill-appearing.   HENT:      Head: Normocephalic and atraumatic.      Nose: No congestion or rhinorrhea.   Eyes:      General: No scleral icterus.     Extraocular Movements: Extraocular movements intact.      Pupils: Pupils are equal, round, and reactive to light.   Cardiovascular:      Rate and Rhythm: Normal rate and regular rhythm.      Pulses: Normal pulses.      Heart sounds: Normal heart sounds. No murmur heard.    No gallop.   Pulmonary:      Effort: Pulmonary effort is normal. No respiratory distress.      Breath sounds: Normal breath sounds. No stridor. No wheezing or rhonchi.   Abdominal:       General: Bowel sounds are normal. There is no distension.      Palpations: There is no mass.      Tenderness: There is no abdominal tenderness. There is no guarding.   Musculoskeletal:         General: No swelling, tenderness, deformity or signs of injury. Normal range of motion.      Cervical back: Normal range of motion and neck supple. No rigidity. No muscular tenderness.      Right lower leg: No edema.      Left lower leg: No edema.   Skin:     General: Skin is warm.      Coloration: Skin is not jaundiced or pale.      Findings: No bruising or lesion.   Neurological:      General: No focal deficit present.      Mental Status: He is alert and oriented to person, place, and time.      Cranial Nerves: No cranial nerve deficit.      Sensory: No sensory deficit.      Motor: No weakness.      Gait: Gait normal.   Psychiatric:         Mood and Affect: Mood normal.         Behavior: Behavior normal.         Thought Content: Thought content normal.               Vitals:    06/08/23 1254   BP: 110/73   Pulse: 69   Resp: 16   Body surface area is 1.95 meters squared.    Assessment/Plan:      ECOG 1    Glioma :    == Grade 2 glioma of the left frontal lobe status post subtotal resection in January of 2023.   == s/p Chemo-XRT completed recently and now on adjuvant Temodar tolerating well.  == I will add Bactrim DS for PCP prohylaxis at this time and see patient prior to C#2  == 6/8/23: Labs reviewed. Tolerating Bactrim DS well with no dose limiting toxicities. Voices no complaints. Next MRI brain in 2 months      Plan:  Continue adjuvant  Temodar  Continue Bactrim DS for PCP prophylaxis      RTC in 4 weeks for MD visit with CBC with diff, CMP prior           Total time spent in counseling and discussion about further management options including relevant lab work, treatment,  prognosis, medications and intended side effects was more than 25 minutes. More than 50% of the time was spent on counseling and coordination of  care.  I spent a total of 25 minutes on the day of the visit.This includes face to face time and non-face to face time preparing to see the patient (eg, review of tests), Obtaining and/or reviewing separately obtained history, Documenting clinical information in the electronic or other health record, Independently interpreting resultsand communicating results to the patient/family/caregiver, or Care coordination.

## 2023-06-14 ENCOUNTER — TELEPHONE (OUTPATIENT)
Dept: NEUROSURGERY | Facility: CLINIC | Age: 48
End: 2023-06-14
Payer: COMMERCIAL

## 2023-06-29 ENCOUNTER — OFFICE VISIT (OUTPATIENT)
Dept: NEUROLOGY | Facility: CLINIC | Age: 48
End: 2023-06-29
Payer: COMMERCIAL

## 2023-06-29 DIAGNOSIS — R56.9 SEIZURES: Chronic | ICD-10-CM

## 2023-06-29 DIAGNOSIS — C71.1 MALIGNANT NEOPLASM OF FRONTAL LOBE: Primary | Chronic | ICD-10-CM

## 2023-06-29 PROCEDURE — 99215 OFFICE O/P EST HI 40 MIN: CPT | Mod: 95,,, | Performed by: PSYCHIATRY & NEUROLOGY

## 2023-06-29 PROCEDURE — 99215 PR OFFICE/OUTPT VISIT, EST, LEVL V, 40-54 MIN: ICD-10-PCS | Mod: 95,,, | Performed by: PSYCHIATRY & NEUROLOGY

## 2023-06-29 NOTE — PROGRESS NOTES
Subjective:       Patient ID: Marvin Pool is a 47 y.o. male.    Chief Complaint: Brain tumor    HPI  Oncologic History  2020: In restrospect, started taking BC powder/having comprehension/speech issues  1/2023: had a seizure in his sleep; when recalling it to family, thought it was sleep paralysis. While out to lunch shortly after with family, had a seizure at the restaurant, then another witnessed by EMS. MRI brain at OSH showed left frontal mass. Transferred to [facility] for higher level of care.  1/12/2023: subtotal resection ([place]) with pathology consistent with oligodendroglioma, 1p/19q co-deleted, WHO grade 2  3/3/2023-4/3/2023: chemoradiation in [place], [place] (Prieto Carson)  5/2023: C1 TMZ @150mg/m2  6/2023: C2 TMZ @200mg/m2    Marvin is doing very well. He has returned to work but is still taking it easy. Had some breakthrough nausea well treated with zofran.    Past Medical History:   Diagnosis Date    Brain tumor 1/12/2023    Seizures 1/12/2023      Current Outpatient Medications   Medication Sig Dispense Refill    acetaminophen (TYLENOL) 500 MG tablet Take 1,000 mg by mouth every 6 (six) hours as needed for Pain.      KLONOPIN 0.5 mg tablet Take 0.5 mg by mouth daily as needed.      levETIRAcetam (KEPPRA) 500 MG Tab Take 500 mg by mouth 2 (two) times daily.      omeprazole (PRILOSEC) 40 MG capsule Take 40 mg by mouth every morning.      ondansetron (ZOFRAN) 8 MG tablet Take 1 tablet (8 mg total) by mouth As instructed for Nausea (TAKE 1 TABLET BY MOUTH 30-45 MINUTES PRIOR TO CHEMOTHERAPY DOSE AND EVERY 8 HOURS AS NEEDED). 30 tablet 3    promethazine (PHENERGAN) 25 MG tablet Take 25 mg by mouth every 6 (six) hours as needed.      sulfamethoxazole-trimethoprim 800-160mg (BACTRIM DS) 800-160 mg Tab Take 1 tablet by mouth once daily. 30 tablet 6    zolpidem (AMBIEN) 10 mg Tab Take 10 mg by mouth nightly as needed.       No current facility-administered medications for this visit.      Past  Surgical History:   Procedure Laterality Date    CRANIOTOMY USING FRAMELESS STEREOTAXY Left 1/12/2023    Procedure: CRANIOTOMY, USING FRAMELESS STEREOTAXY;  Surgeon: [place] Park MD;  Location: [facility] OR [address] [place] [address];  Service: Neurosurgery;  Laterality: Left;      Family History   Problem Relation Age of Onset    No Known Problems Mother     No Known Problems Father     No Known Problems Sister     No Known Problems Brother     No Known Problems Maternal Aunt       Social History     Tobacco Use    Smoking status: Never    Smokeless tobacco: Never   Substance Use Topics    Alcohol use: Not Currently      Review of Systems   Constitutional:  Negative for unexpected weight change.   Eyes:  Negative for visual disturbance.   Respiratory:  Negative for shortness of breath.    Cardiovascular:  Negative for leg swelling.   Gastrointestinal:  Negative for abdominal distention.   Musculoskeletal:  Negative for gait problem.   Neurological:  Negative for seizures and weakness.   Psychiatric/Behavioral:  Positive for agitation and sleep disturbance.    KPS: 100      Objective:      There were no vitals filed for this visit.  (Telehealth visit)    Prior:  NEUROLOGICAL EXAMINATION:     MENTAL STATUS   Attention: normal. Concentration: normal.   Speech: speech is normal   Level of consciousness: alert  Knowledge: good.     CRANIAL NERVES   Cranial nerves II through XII intact.     MOTOR EXAM     Strength   Strength 5/5 throughout.     REFLEXES     Reflexes   Right brachioradialis: 2+  Left brachioradialis: 2+  Right biceps: 2+  Left biceps: 2+  Right patellar: 2+  Left patellar: 2+  Right achilles: 2+  Left achilles: 2+    SENSORY EXAM   Light touch normal.     GAIT AND COORDINATION     Gait  Gait: normal     Coordination   Finger to nose coordination: normal     MRI brain from 4/27/2023 was personally visualized and compared to prior. This study shows expected post-operative changes from STR.    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 Tumor diagnosis was heralded by seizures. Current symptoms include none.    Oligodendroglioma  Completed chemoradiation. Has not had labs drawn but will do so tomorrow. If adequate will prescribe C3 TMZ @200mg/m2. F/u in 4 weeks with CBC/CMP, MRI brain in anticipation of C4 adjuvant TMZ.    Seizures  Continue keppra.           The patient location is: [place]  The chief complaint leading to consultation is: oligodendroglioma    Visit type: audiovisual    Face to Face time with patient: 7 minutes  30 minutes of total time spent on the encounter, which includes face to face time and non-face to face time preparing to see the patient (eg, review of tests), Obtaining and/or reviewing separately obtained history, Documenting clinical information in the electronic or other health record, Independently interpreting results (not separately reported) and communicating results to the patient/family/caregiver, or Care coordination (not separately reported).         Each patient to whom he or she provides medical services by telemedicine is:  (1) informed of the relationship between the physician and patient and the respective role of any other health care provider with respect to management of the patient; and (2) notified that he or she may decline to receive medical services by telemedicine and may withdraw from such care at any time.    Notes:        Alyse Roman MD  Neuro-Oncology, Movement Disorders

## 2023-07-07 LAB
ALBUMIN SERPL BCP-MCNC: 4 G/DL (ref 3.4–5)
ALBUMIN/GLOBULIN RATIO: 1.05 RATIO (ref 1.1–1.8)
ALP SERPL-CCNC: 67 U/L (ref 46–116)
ALT SERPL W P-5'-P-CCNC: 29 U/L (ref 12–78)
ANION GAP SERPL CALC-SCNC: 10 MMOL/L (ref 3–11)
AST SERPL-CCNC: 21 U/L (ref 15–37)
BASOPHILS NFR BLD: 0.5 % (ref 0–3)
BILIRUB SERPL-MCNC: 0.4 MG/DL (ref 0–1)
BUN SERPL-MCNC: 12 MG/DL (ref 7–18)
BUN/CREAT SERPL: 10.25 RATIO (ref 7–18)
CALCIUM SERPL-MCNC: 8.6 MG/DL (ref 8.8–10.5)
CHLORIDE SERPL-SCNC: 99 MMOL/L (ref 100–108)
CO2 SERPL-SCNC: 26 MMOL/L (ref 21–32)
CREAT SERPL-MCNC: 1.17 MG/DL (ref 0.7–1.3)
EOSINOPHIL NFR BLD: 3.3 % (ref 1–3)
ERYTHROCYTE [DISTWIDTH] IN BLOOD BY AUTOMATED COUNT: 13.3 % (ref 12.5–18)
GFR ESTIMATION: > 60
GLOBULIN: 3.8 G/DL (ref 2.3–3.5)
GLUCOSE SERPL-MCNC: 122 MG/DL (ref 70–110)
HCT VFR BLD AUTO: 41.6 % (ref 42–52)
HGB BLD-MCNC: 13.8 G/DL (ref 14–18)
LYMPHOCYTES NFR BLD: 17.6 % (ref 25–40)
MCH RBC QN AUTO: 26.7 PG (ref 27–31.2)
MCHC RBC AUTO-ENTMCNC: 33.2 G/DL (ref 31.8–35.4)
MCV RBC AUTO: 80.6 FL (ref 80–97)
MONOCYTES NFR BLD: 11.3 % (ref 1–15)
NEUTROPHILS # BLD AUTO: 3.84 10*3/UL (ref 1.8–7.7)
NEUTROPHILS NFR BLD: 67 % (ref 37–80)
NUCLEATED RED BLOOD CELLS: 0 %
PLATELETS: 255 10*3/UL (ref 142–424)
POTASSIUM SERPL-SCNC: 3.7 MMOL/L (ref 3.6–5.2)
PROT SERPL-MCNC: 7.8 G/DL (ref 6.4–8.2)
RBC # BLD AUTO: 5.16 10*6/UL (ref 4.7–6.1)
SODIUM BLD-SCNC: 135 MMOL/L (ref 135–145)
WBC # BLD: 5.7 10*3/UL (ref 4.6–10.2)

## 2023-07-10 ENCOUNTER — OFFICE VISIT (OUTPATIENT)
Dept: HEMATOLOGY/ONCOLOGY | Facility: CLINIC | Age: 48
End: 2023-07-10
Payer: COMMERCIAL

## 2023-07-10 ENCOUNTER — TELEPHONE (OUTPATIENT)
Dept: HEMATOLOGY/ONCOLOGY | Facility: CLINIC | Age: 48
End: 2023-07-10

## 2023-07-10 VITALS
DIASTOLIC BLOOD PRESSURE: 75 MMHG | RESPIRATION RATE: 16 BRPM | WEIGHT: 168.81 LBS | OXYGEN SATURATION: 95 % | BODY MASS INDEX: 24.17 KG/M2 | SYSTOLIC BLOOD PRESSURE: 115 MMHG | HEART RATE: 75 BPM | HEIGHT: 70 IN

## 2023-07-10 DIAGNOSIS — C71.1 MALIGNANT NEOPLASM OF FRONTAL LOBE: Primary | ICD-10-CM

## 2023-07-10 PROCEDURE — 1159F PR MEDICATION LIST DOCUMENTED IN MEDICAL RECORD: ICD-10-PCS | Mod: CPTII,S$GLB,, | Performed by: INTERNAL MEDICINE

## 2023-07-10 PROCEDURE — 99214 PR OFFICE/OUTPT VISIT, EST, LEVL IV, 30-39 MIN: ICD-10-PCS | Mod: S$GLB,,, | Performed by: INTERNAL MEDICINE

## 2023-07-10 PROCEDURE — 99214 OFFICE O/P EST MOD 30 MIN: CPT | Mod: S$GLB,,, | Performed by: INTERNAL MEDICINE

## 2023-07-10 PROCEDURE — 3078F PR MOST RECENT DIASTOLIC BLOOD PRESSURE < 80 MM HG: ICD-10-PCS | Mod: CPTII,S$GLB,, | Performed by: INTERNAL MEDICINE

## 2023-07-10 PROCEDURE — 3008F PR BODY MASS INDEX (BMI) DOCUMENTED: ICD-10-PCS | Mod: CPTII,S$GLB,, | Performed by: INTERNAL MEDICINE

## 2023-07-10 PROCEDURE — 3078F DIAST BP <80 MM HG: CPT | Mod: CPTII,S$GLB,, | Performed by: INTERNAL MEDICINE

## 2023-07-10 PROCEDURE — 1159F MED LIST DOCD IN RCRD: CPT | Mod: CPTII,S$GLB,, | Performed by: INTERNAL MEDICINE

## 2023-07-10 PROCEDURE — 3074F SYST BP LT 130 MM HG: CPT | Mod: CPTII,S$GLB,, | Performed by: INTERNAL MEDICINE

## 2023-07-10 PROCEDURE — 3074F PR MOST RECENT SYSTOLIC BLOOD PRESSURE < 130 MM HG: ICD-10-PCS | Mod: CPTII,S$GLB,, | Performed by: INTERNAL MEDICINE

## 2023-07-10 PROCEDURE — 3008F BODY MASS INDEX DOCD: CPT | Mod: CPTII,S$GLB,, | Performed by: INTERNAL MEDICINE

## 2023-07-10 NOTE — PROGRESS NOTES
MEDICAL ONCOLOGY FOLLOW UP CONSULTATION NOTE    Patient ID: Marvin Pool is a 47 y.o. male.    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  He also completed chemo XRT and now is currently on adjuvant Temodar.  He would like to establish care here locally in [place] and voices no acute complaints reporting that he is tolerated Temodar without any side effects at this time.  He presents to our clinic today for further evaluation        Imaging:       MRI brain:  01/12/23    Expected immediate postsurgical changes of left frontal craniotomy for frontal mass resection.     Persistent area of T2 FLAIR hyperintense signal surrounding the resection is similar size compared to preoperative MRI. Typical thin peripheral enhancement around the resection cavity.        Past Medical History:   Diagnosis Date    Brain tumor 1/12/2023    Seizures 1/12/2023     Family History   Problem Relation Age of Onset    No Known Problems Mother     No Known Problems Father     No Known Problems Sister     No Known Problems Brother     No Known Problems Maternal Aunt      Social History     Socioeconomic History    Marital status:    Tobacco Use    Smoking status: Never    Smokeless tobacco: Never   Substance and Sexual Activity    Alcohol use: Not Currently     Past Surgical History:   Procedure Laterality Date    CRANIOTOMY USING FRAMELESS STEREOTAXY Left 1/12/2023    Procedure: CRANIOTOMY, USING FRAMELESS STEREOTAXY;  Surgeon: [place] Park MD;  Location: [facility] OR [address] [place] [address];  Service: Neurosurgery;  Laterality: Left;         Review of systems:  Review of Systems   Constitutional:  Negative for activity change, appetite change, chills, diaphoresis, fatigue and unexpected weight change.   HENT:  Negative for congestion, facial swelling, hearing loss, mouth  sores, trouble swallowing and voice change.    Eyes:  Negative for photophobia, pain, discharge and itching.   Respiratory:  Negative for apnea, cough, choking, chest tightness and shortness of breath.    Cardiovascular:  Negative for chest pain, palpitations and leg swelling.   Gastrointestinal:  Negative for abdominal distention, abdominal pain, anal bleeding and blood in stool.   Endocrine: Negative for cold intolerance, heat intolerance, polydipsia and polyphagia.   Genitourinary:  Negative for difficulty urinating, dysuria, flank pain and hematuria.   Musculoskeletal:  Negative for arthralgias, back pain, joint swelling, myalgias, neck pain and neck stiffness.   Skin:  Negative for color change, pallor and wound.   Allergic/Immunologic: Negative for environmental allergies, food allergies and immunocompromised state.   Neurological:  Negative for dizziness, seizures, facial asymmetry, speech difficulty, light-headedness, numbness and headaches.   Hematological:  Negative for adenopathy. Does not bruise/bleed easily.   Psychiatric/Behavioral:  Negative for agitation, behavioral problems, confusion, decreased concentration and sleep disturbance.          Physical Exam  Vitals and nursing note reviewed.   Constitutional:       General: He is not in acute distress.     Appearance: Normal appearance. He is not ill-appearing.   HENT:      Head: Normocephalic and atraumatic.      Nose: No congestion or rhinorrhea.   Eyes:      General: No scleral icterus.     Extraocular Movements: Extraocular movements intact.      Pupils: Pupils are equal, round, and reactive to light.   Cardiovascular:      Rate and Rhythm: Normal rate and regular rhythm.      Pulses: Normal pulses.      Heart sounds: Normal heart sounds. No murmur heard.    No gallop.   Pulmonary:      Effort: Pulmonary effort is normal. No respiratory distress.      Breath sounds: Normal breath sounds. No stridor. No wheezing or rhonchi.   Abdominal:       General: Bowel sounds are normal. There is no distension.      Palpations: There is no mass.      Tenderness: There is no abdominal tenderness. There is no guarding.   Musculoskeletal:         General: No swelling, tenderness, deformity or signs of injury. Normal range of motion.      Cervical back: Normal range of motion and neck supple. No rigidity. No muscular tenderness.      Right lower leg: No edema.      Left lower leg: No edema.   Skin:     General: Skin is warm.      Coloration: Skin is not jaundiced or pale.      Findings: No bruising or lesion.   Neurological:      General: No focal deficit present.      Mental Status: He is alert and oriented to person, place, and time.      Cranial Nerves: No cranial nerve deficit.      Sensory: No sensory deficit.      Motor: No weakness.      Gait: Gait normal.   Psychiatric:         Mood and Affect: Mood normal.         Behavior: Behavior normal.         Thought Content: Thought content normal.               Vitals:    07/10/23 1137   BP: 115/75   Pulse: 75   Resp: 16   Body surface area is 1.95 meters squared.    Assessment/Plan:      ECOG 1    Glioma :    == Grade 2 glioma of the left frontal lobe status post subtotal resection in January of 2023.   == s/p Chemo-XRT completed recently and now on adjuvant Temodar tolerating well.  == I will add Bactrim DS for PCP prohylaxis at this time and see patient prior to C#2  == 7/10/23: Labs reviewed. Tolerating Bactrim DS well with no dose limiting toxicities. Voices no complaints. Next MRI brain in Aug 2023      Plan:  Continue adjuvant  Temodar  Continue Bactrim DS for PCP prophylaxis  MRI brain with and without contrast prior      RTC in 4 weeks for MD visit with CBC with diff, CMP prior           Total time spent in counseling and discussion about further management options including relevant lab work, treatment,  prognosis, medications and intended side effects was more than 25 minutes. More than 50% of the time was  spent on counseling and coordination of care.  I spent a total of 25 minutes on the day of the visit.This includes face to face time and non-face to face time preparing to see the patient (eg, review of tests), Obtaining and/or reviewing separately obtained history, Documenting clinical information in the electronic or other health record, Independently interpreting resultsand communicating results to the patient/family/caregiver, or Care coordination.

## 2023-07-11 ENCOUNTER — SPECIALTY PHARMACY (OUTPATIENT)
Dept: PHARMACY | Facility: CLINIC | Age: 48
End: 2023-07-11
Payer: COMMERCIAL

## 2023-07-11 ENCOUNTER — TELEPHONE (OUTPATIENT)
Dept: PHARMACY | Facility: CLINIC | Age: 48
End: 2023-07-11
Payer: COMMERCIAL

## 2023-07-11 DIAGNOSIS — C71.9 GLIOMA: ICD-10-CM

## 2023-07-11 DIAGNOSIS — D49.6 BRAIN TUMOR: ICD-10-CM

## 2023-07-11 RX ORDER — TEMOZOLOMIDE 140 MG/1
140 CAPSULE ORAL DAILY
Qty: 5 CAPSULE | Refills: 0 | Status: ACTIVE | OUTPATIENT
Start: 2023-07-11 | End: 2023-07-16

## 2023-07-11 RX ORDER — TEMOZOLOMIDE 250 MG/1
250 CAPSULE ORAL DAILY
Qty: 5 CAPSULE | Refills: 0 | Status: ACTIVE | OUTPATIENT
Start: 2023-07-11 | End: 2023-07-31

## 2023-07-11 NOTE — TELEPHONE ENCOUNTER
Hello, this is Kofi Lopez, clinical pharmacist with [facility] that is part of your care team.  We have begun working on your prescription that your doctor has sent us. Our next steps include:     Working with your insurance company to obtain approval for your medication  Working with you to ensure your medication is affordable     We will be calling you along the way with updates on your medication but if you have any concerns or receive information that you would like to discuss please reach us at [phone].    Welcome call outcome: Patient/caregiver reached

## 2023-07-11 NOTE — TELEPHONE ENCOUNTER
Patient confirms that he has been on this same dose and cycle of Temodar and that he fills at [facility], as we are out of network under his [employer] [place] insurance. I let him know we will forward to [facility]. Patient voiced understanding and says that his doctor wants him to start it as soon as he gets it.

## 2023-07-24 DIAGNOSIS — R33.9 URINE RETENTION: Primary | ICD-10-CM

## 2023-07-28 DIAGNOSIS — D49.6 BRAIN TUMOR: ICD-10-CM

## 2023-07-28 DIAGNOSIS — C71.9 GLIOMA: ICD-10-CM

## 2023-07-31 ENCOUNTER — TELEPHONE (OUTPATIENT)
Dept: HEMATOLOGY/ONCOLOGY | Facility: CLINIC | Age: 48
End: 2023-07-31
Payer: COMMERCIAL

## 2023-07-31 DIAGNOSIS — D49.6 BRAIN TUMOR: ICD-10-CM

## 2023-07-31 DIAGNOSIS — C71.9 GLIOMA: Primary | ICD-10-CM

## 2023-07-31 DIAGNOSIS — C71.9 GLIOMA: ICD-10-CM

## 2023-07-31 RX ORDER — TEMOZOLOMIDE 140 MG/1
140 CAPSULE ORAL DAILY
Qty: 42 CAPSULE | Refills: 0 | Status: SHIPPED | OUTPATIENT
Start: 2023-07-31 | End: 2023-08-02 | Stop reason: SDUPTHER

## 2023-07-31 RX ORDER — TEMOZOLOMIDE 250 MG/1
CAPSULE ORAL
Qty: 5 CAPSULE | Refills: 0 | Status: SHIPPED | OUTPATIENT
Start: 2023-07-31 | End: 2023-08-23

## 2023-08-02 RX ORDER — TEMOZOLOMIDE 140 MG/1
140 CAPSULE ORAL DAILY
Qty: 5 CAPSULE | Refills: 0 | Status: SHIPPED | OUTPATIENT
Start: 2023-08-02 | End: 2023-08-22 | Stop reason: SDUPTHER

## 2023-08-04 LAB
ALBUMIN SERPL BCP-MCNC: 4.2 G/DL (ref 3.4–5)
ALBUMIN/GLOBULIN RATIO: 1.02 RATIO (ref 1.1–1.8)
ALP SERPL-CCNC: 76 U/L (ref 46–116)
ALT SERPL W P-5'-P-CCNC: 38 U/L (ref 12–78)
ANION GAP SERPL CALC-SCNC: 11 MMOL/L (ref 3–11)
AST SERPL-CCNC: 36 U/L (ref 15–37)
BASOPHILS NFR BLD: 0.4 % (ref 0–3)
BILIRUB SERPL-MCNC: 0.7 MG/DL (ref 0–1)
BUN SERPL-MCNC: 12 MG/DL (ref 7–18)
BUN/CREAT SERPL: 12.24 RATIO (ref 7–18)
CALCIUM SERPL-MCNC: 9.4 MG/DL (ref 8.8–10.5)
CHLORIDE SERPL-SCNC: 100 MMOL/L (ref 100–108)
CO2 SERPL-SCNC: 24 MMOL/L (ref 21–32)
CREAT SERPL-MCNC: 0.98 MG/DL (ref 0.7–1.3)
EOSINOPHIL NFR BLD: 6 % (ref 1–3)
ERYTHROCYTE [DISTWIDTH] IN BLOOD BY AUTOMATED COUNT: 13.7 % (ref 12.5–18)
GFR ESTIMATION: > 60
GLOBULIN: 4.1 G/DL (ref 2.3–3.5)
GLUCOSE SERPL-MCNC: 117 MG/DL (ref 70–110)
HCT VFR BLD AUTO: 42.9 % (ref 42–52)
HGB BLD-MCNC: 14.8 G/DL (ref 14–18)
LYMPHOCYTES NFR BLD: 17.2 % (ref 25–40)
MCH RBC QN AUTO: 27.6 PG (ref 27–31.2)
MCHC RBC AUTO-ENTMCNC: 34.5 G/DL (ref 31.8–35.4)
MCV RBC AUTO: 79.9 FL (ref 80–97)
MONOCYTES NFR BLD: 12.9 % (ref 1–15)
NEUTROPHILS # BLD AUTO: 3.04 10*3/UL (ref 1.8–7.7)
NEUTROPHILS NFR BLD: 63.1 % (ref 37–80)
NUCLEATED RED BLOOD CELLS: 0 %
PLATELETS: 422 10*3/UL (ref 142–424)
POTASSIUM SERPL-SCNC: 3.4 MMOL/L (ref 3.6–5.2)
PROT SERPL-MCNC: 8.3 G/DL (ref 6.4–8.2)
RBC # BLD AUTO: 5.37 10*6/UL (ref 4.7–6.1)
SODIUM BLD-SCNC: 135 MMOL/L (ref 135–145)
WBC # BLD: 4.8 10*3/UL (ref 4.6–10.2)

## 2023-08-07 ENCOUNTER — OFFICE VISIT (OUTPATIENT)
Dept: HEMATOLOGY/ONCOLOGY | Facility: CLINIC | Age: 48
End: 2023-08-07
Payer: COMMERCIAL

## 2023-08-07 VITALS
DIASTOLIC BLOOD PRESSURE: 78 MMHG | BODY MASS INDEX: 24.34 KG/M2 | HEIGHT: 70 IN | WEIGHT: 170 LBS | HEART RATE: 71 BPM | OXYGEN SATURATION: 98 % | RESPIRATION RATE: 16 BRPM | SYSTOLIC BLOOD PRESSURE: 128 MMHG

## 2023-08-07 DIAGNOSIS — C71.1 MALIGNANT NEOPLASM OF FRONTAL LOBE: ICD-10-CM

## 2023-08-07 DIAGNOSIS — D49.6 BRAIN TUMOR: ICD-10-CM

## 2023-08-07 DIAGNOSIS — C71.9 GLIOMA: Primary | ICD-10-CM

## 2023-08-07 PROCEDURE — 1159F PR MEDICATION LIST DOCUMENTED IN MEDICAL RECORD: ICD-10-PCS | Mod: CPTII,S$GLB,, | Performed by: NURSE PRACTITIONER

## 2023-08-07 PROCEDURE — 1159F MED LIST DOCD IN RCRD: CPT | Mod: CPTII,S$GLB,, | Performed by: NURSE PRACTITIONER

## 2023-08-07 PROCEDURE — 3074F PR MOST RECENT SYSTOLIC BLOOD PRESSURE < 130 MM HG: ICD-10-PCS | Mod: CPTII,S$GLB,, | Performed by: NURSE PRACTITIONER

## 2023-08-07 PROCEDURE — 3078F DIAST BP <80 MM HG: CPT | Mod: CPTII,S$GLB,, | Performed by: NURSE PRACTITIONER

## 2023-08-07 PROCEDURE — 3008F BODY MASS INDEX DOCD: CPT | Mod: CPTII,S$GLB,, | Performed by: NURSE PRACTITIONER

## 2023-08-07 PROCEDURE — 3078F PR MOST RECENT DIASTOLIC BLOOD PRESSURE < 80 MM HG: ICD-10-PCS | Mod: CPTII,S$GLB,, | Performed by: NURSE PRACTITIONER

## 2023-08-07 PROCEDURE — 3074F SYST BP LT 130 MM HG: CPT | Mod: CPTII,S$GLB,, | Performed by: NURSE PRACTITIONER

## 2023-08-07 PROCEDURE — 99214 PR OFFICE/OUTPT VISIT, EST, LEVL IV, 30-39 MIN: ICD-10-PCS | Mod: S$GLB,,, | Performed by: NURSE PRACTITIONER

## 2023-08-07 PROCEDURE — 3008F PR BODY MASS INDEX (BMI) DOCUMENTED: ICD-10-PCS | Mod: CPTII,S$GLB,, | Performed by: NURSE PRACTITIONER

## 2023-08-07 PROCEDURE — 99214 OFFICE O/P EST MOD 30 MIN: CPT | Mod: S$GLB,,, | Performed by: NURSE PRACTITIONER

## 2023-08-07 NOTE — PROGRESS NOTES
MEDICAL ONCOLOGY FOLLOW UP CONSULTATION NOTE    Patient ID: Marvin Pool is a 47 y.o. male.    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  He also completed chemo XRT and now is currently on adjuvant Temodar.  He would like to establish care here locally in [place] and voices no acute complaints reporting that he is tolerated Temodar without any side effects at this time.  He presents to our clinic today for further evaluation        Imaging:       MRI brain:  01/12/23    Expected immediate postsurgical changes of left frontal craniotomy for frontal mass resection.     Persistent area of T2 FLAIR hyperintense signal surrounding the resection is similar size compared to preoperative MRI. Typical thin peripheral enhancement around the resection cavity.        Past Medical History:   Diagnosis Date    Brain tumor 1/12/2023    Seizures 1/12/2023     Family History   Problem Relation Age of Onset    No Known Problems Mother     No Known Problems Father     No Known Problems Sister     No Known Problems Brother     No Known Problems Maternal Aunt      Social History     Socioeconomic History    Marital status:    Tobacco Use    Smoking status: Never    Smokeless tobacco: Never   Substance and Sexual Activity    Alcohol use: Not Currently     Past Surgical History:   Procedure Laterality Date    CRANIOTOMY USING FRAMELESS STEREOTAXY Left 1/12/2023    Procedure: CRANIOTOMY, USING FRAMELESS STEREOTAXY;  Surgeon: [place] Park MD;  Location: [facility] OR [address] [place] [address];  Service: Neurosurgery;  Laterality: Left;         Review of systems:  Review of Systems   Constitutional:  Negative for activity change, appetite change, chills, diaphoresis, fatigue and unexpected weight change.   HENT:  Negative for congestion, facial swelling, hearing loss, mouth  sores, trouble swallowing and voice change.    Eyes:  Negative for photophobia, pain, discharge and itching.   Respiratory:  Negative for apnea, cough, choking, chest tightness and shortness of breath.    Cardiovascular:  Negative for chest pain, palpitations and leg swelling.   Gastrointestinal:  Negative for abdominal distention, abdominal pain, anal bleeding and blood in stool.   Endocrine: Negative for cold intolerance, heat intolerance, polydipsia and polyphagia.   Genitourinary:  Negative for difficulty urinating, dysuria, flank pain and hematuria.   Musculoskeletal:  Negative for arthralgias, back pain, joint swelling, myalgias, neck pain and neck stiffness.   Skin:  Negative for color change, pallor and wound.   Allergic/Immunologic: Negative for environmental allergies, food allergies and immunocompromised state.   Neurological:  Negative for dizziness, seizures, facial asymmetry, speech difficulty, light-headedness, numbness and headaches.   Hematological:  Negative for adenopathy. Does not bruise/bleed easily.   Psychiatric/Behavioral:  Negative for agitation, behavioral problems, confusion, decreased concentration and sleep disturbance.          Physical Exam  Vitals and nursing note reviewed.   Constitutional:       General: He is not in acute distress.     Appearance: Normal appearance. He is not ill-appearing.   HENT:      Head: Normocephalic and atraumatic.      Nose: No congestion or rhinorrhea.   Eyes:      General: No scleral icterus.     Extraocular Movements: Extraocular movements intact.      Pupils: Pupils are equal, round, and reactive to light.   Cardiovascular:      Rate and Rhythm: Normal rate and regular rhythm.      Pulses: Normal pulses.      Heart sounds: Normal heart sounds. No murmur heard.     No gallop.   Pulmonary:      Effort: Pulmonary effort is normal. No respiratory distress.      Breath sounds: Normal breath sounds. No stridor. No wheezing or rhonchi.   Abdominal:       General: Bowel sounds are normal. There is no distension.      Palpations: There is no mass.      Tenderness: There is no abdominal tenderness. There is no guarding.   Musculoskeletal:         General: No swelling, tenderness, deformity or signs of injury. Normal range of motion.      Cervical back: Normal range of motion and neck supple. No rigidity. No muscular tenderness.      Right lower leg: No edema.      Left lower leg: No edema.   Skin:     General: Skin is warm.      Coloration: Skin is not jaundiced or pale.      Findings: No bruising or lesion.   Neurological:      General: No focal deficit present.      Mental Status: He is alert and oriented to person, place, and time.      Cranial Nerves: No cranial nerve deficit.      Sensory: No sensory deficit.      Motor: No weakness.      Gait: Gait normal.   Psychiatric:         Mood and Affect: Mood normal.         Behavior: Behavior normal.         Thought Content: Thought content normal.               There were no vitals filed for this visit.  There is no height or weight on file to calculate BSA.    Assessment/Plan:      ECOG 1    Glioma :    == Grade 2 glioma of the left frontal lobe status post subtotal resection in January of 2023.   == s/p Chemo-XRT completed recently and now on adjuvant Temodar tolerating well.  == I will add Bactrim DS for PCP prohylaxis at this time and see patient prior to C#2  == 7/10/23: Labs reviewed. Tolerating Bactrim DS well with no dose limiting toxicities. Voices no complaints. Next MRI brain in Aug 2023  ==08/07/2023: First cycle of adjuvant Temozolomide well tolerated, patient has some mild nausea relieved with phenergan prn. He is not taking ondansetron states phenergan works better.  Temozolomide dosage is 390mg days 1-5 q 28 days.  Reports good compliance with bactrim for pcp prophylaxis. MRI Brain reviewed Next MRI brain is scheduled November 6th 2023.  No headaches, visual changes. Good compliance with Keppra, will  check keppra level with labs.    Plan:  Ok to procede with cycle 2 Temozolomide  RTC 4 weeks with MD cbc cmp keppra level  Continue Bactrim DS for PCP prophylaxis  MRI brain with and without contrast prior - has already been ordered per Dr. Carson, scheduled 11/6/23      RTC in 4 weeks for MD visit with CBC with diff, CMP prior         Total time spent in counseling and discussion about further management options including relevant lab work, treatment,  prognosis, medications and intended side effects was more than 25 minutes. More than 50% of the time was spent on counseling and coordination of care.  I spent a total of 25 minutes on the day of the visit.This includes face to face time and non-face to face time preparing to see the patient (eg, review of tests), Obtaining and/or reviewing separately obtained history, Documenting clinical information in the electronic or other health record, Independently interpreting resultsand communicating results to the patient/family/caregiver, or Care coordination.

## 2023-08-10 ENCOUNTER — OFFICE VISIT (OUTPATIENT)
Dept: UROLOGY | Facility: CLINIC | Age: 48
End: 2023-08-10
Payer: COMMERCIAL

## 2023-08-10 VITALS — SYSTOLIC BLOOD PRESSURE: 120 MMHG | DIASTOLIC BLOOD PRESSURE: 76 MMHG

## 2023-08-10 DIAGNOSIS — R35.1 NOCTURIA: ICD-10-CM

## 2023-08-10 DIAGNOSIS — N13.8 BPH WITH URINARY OBSTRUCTION: ICD-10-CM

## 2023-08-10 DIAGNOSIS — N40.1 BPH WITH URINARY OBSTRUCTION: ICD-10-CM

## 2023-08-10 DIAGNOSIS — R35.0 URINARY FREQUENCY: Primary | ICD-10-CM

## 2023-08-10 DIAGNOSIS — R39.15 URINARY URGENCY: ICD-10-CM

## 2023-08-10 LAB
BILIRUBIN, UA POC OHS: NEGATIVE
BLOOD, UA POC OHS: NEGATIVE
CLARITY, UA POC OHS: CLEAR
COLOR, UA POC OHS: YELLOW
GLUCOSE, UA POC OHS: NEGATIVE
KETONES, UA POC OHS: NEGATIVE
LEUKOCYTES, UA POC OHS: NEGATIVE
NITRITE, UA POC OHS: NEGATIVE
PH, UA POC OHS: 6
PROTEIN, UA POC OHS: NEGATIVE
SPECIFIC GRAVITY, UA POC OHS: <=1.005
UROBILINOGEN, UA POC OHS: 0.2

## 2023-08-10 PROCEDURE — 1159F MED LIST DOCD IN RCRD: CPT | Mod: CPTII,S$GLB,, | Performed by: NURSE PRACTITIONER

## 2023-08-10 PROCEDURE — 3078F PR MOST RECENT DIASTOLIC BLOOD PRESSURE < 80 MM HG: ICD-10-PCS | Mod: CPTII,S$GLB,, | Performed by: NURSE PRACTITIONER

## 2023-08-10 PROCEDURE — 1159F PR MEDICATION LIST DOCUMENTED IN MEDICAL RECORD: ICD-10-PCS | Mod: CPTII,S$GLB,, | Performed by: NURSE PRACTITIONER

## 2023-08-10 PROCEDURE — 3078F DIAST BP <80 MM HG: CPT | Mod: CPTII,S$GLB,, | Performed by: NURSE PRACTITIONER

## 2023-08-10 PROCEDURE — 1160F PR REVIEW ALL MEDS BY PRESCRIBER/CLIN PHARMACIST DOCUMENTED: ICD-10-PCS | Mod: CPTII,S$GLB,, | Performed by: NURSE PRACTITIONER

## 2023-08-10 PROCEDURE — 99204 PR OFFICE/OUTPT VISIT, NEW, LEVL IV, 45-59 MIN: ICD-10-PCS | Mod: S$GLB,,, | Performed by: NURSE PRACTITIONER

## 2023-08-10 PROCEDURE — 3074F SYST BP LT 130 MM HG: CPT | Mod: CPTII,S$GLB,, | Performed by: NURSE PRACTITIONER

## 2023-08-10 PROCEDURE — 1160F RVW MEDS BY RX/DR IN RCRD: CPT | Mod: CPTII,S$GLB,, | Performed by: NURSE PRACTITIONER

## 2023-08-10 PROCEDURE — 99204 OFFICE O/P NEW MOD 45 MIN: CPT | Mod: S$GLB,,, | Performed by: NURSE PRACTITIONER

## 2023-08-10 PROCEDURE — 81003 POCT URINALYSIS(INSTRUMENT): ICD-10-PCS | Mod: QW,S$GLB,, | Performed by: NURSE PRACTITIONER

## 2023-08-10 PROCEDURE — 3074F PR MOST RECENT SYSTOLIC BLOOD PRESSURE < 130 MM HG: ICD-10-PCS | Mod: CPTII,S$GLB,, | Performed by: NURSE PRACTITIONER

## 2023-08-10 PROCEDURE — 81003 URINALYSIS AUTO W/O SCOPE: CPT | Mod: QW,S$GLB,, | Performed by: NURSE PRACTITIONER

## 2023-08-10 RX ORDER — TAMSULOSIN HYDROCHLORIDE 0.4 MG/1
0.4 CAPSULE ORAL DAILY
Qty: 90 CAPSULE | Refills: 3 | Status: SHIPPED | OUTPATIENT
Start: 2023-08-10 | End: 2023-11-07

## 2023-08-10 NOTE — PROGRESS NOTES
Subjective:       Patient ID: Marvin Pool is a 47 y.o. male.    Chief Complaint: Urinary Retention (Difficulty voiding)      HPI: 47-year-old male, new to [facility], referred by Dr. Ortiz.  Patient presents with complaint of difficulty voiding.  States he is a weak stream.  States at times he will have some spraying.  Complains of frequency, urgency, and nocturia.    He used to drink a lot of soda.  However, states he is decrease his soda.  Drinks about 2 cups of coffee per day.    Denies any pain or burning urination.  Denies any odor to the urine.  Denies any fever or body aches.  Denies any blood in urine.  He does have a history of a brain tumor.  However, he states that the tumor has been ruled out as a possible cause of his urinary symptoms.    No other urinary complaints at this time.         Past Medical History:   Past Medical History:   Diagnosis Date    Brain tumor 1/12/2023    Seizures 1/12/2023       Past Surgical Historical:   Past Surgical History:   Procedure Laterality Date    CRANIOTOMY USING FRAMELESS STEREOTAXY Left 1/12/2023    Procedure: CRANIOTOMY, USING FRAMELESS STEREOTAXY;  Surgeon: Wellington Park MD;  Location: [facility] OR [address] [place] [address];  Service: Neurosurgery;  Laterality: Left;        Medications:   Medication List with Changes/Refills   New Medications    TAMSULOSIN (FLOMAX) 0.4 MG CAP    Take 1 capsule (0.4 mg total) by mouth once daily.   Current Medications    ACETAMINOPHEN (TYLENOL) 500 MG TABLET    Take 1,000 mg by mouth every 6 (six) hours as needed for Pain.    KLONOPIN 0.5 MG TABLET    Take 0.5 mg by mouth daily as needed.    LEVETIRACETAM (KEPPRA) 500 MG TAB    Take 500 mg by mouth 2 (two) times daily.    OMEPRAZOLE (PRILOSEC) 40 MG CAPSULE    Take 40 mg by mouth every morning.    ONDANSETRON (ZOFRAN) 8 MG TABLET    Take 1 tablet (8 mg total) by mouth As instructed for Nausea (TAKE 1 TABLET BY MOUTH 30-45 MINUTES PRIOR TO CHEMOTHERAPY DOSE AND EVERY 8 HOURS AS NEEDED).     PROMETHAZINE (PHENERGAN) 25 MG TABLET    Take 25 mg by mouth every 6 (six) hours as needed.    SULFAMETHOXAZOLE-TRIMETHOPRIM 800-160MG (BACTRIM DS) 800-160 MG TAB    Take 1 tablet by mouth once daily.    TEMOZOLOMIDE (TEMODAR) 250 MG CAPSULE    TAKE 1 CAPSULE  MG WITH 1 CAPSULE  MG (390 MG TOTAL) ONCE DAILY ON DAYS 1 THROUGH 5 EVERY 28 DAYS (TAKE ON AN EMPTY STOMACH)    ZOLPIDEM (AMBIEN) 10 MG TAB    Take 10 mg by mouth nightly as needed.        Past Social History:   Social History     Socioeconomic History    Marital status:    Tobacco Use    Smoking status: Never    Smokeless tobacco: Never   Substance and Sexual Activity    Alcohol use: Not Currently       Allergies: Review of patient's allergies indicates:  No Known Allergies     Family History:   Family History   Problem Relation Age of Onset    No Known Problems Mother     No Known Problems Father     No Known Problems Sister     No Known Problems Brother     No Known Problems Maternal Aunt         Review of Systems:  Review of Systems   Constitutional:  Negative for activity change and appetite change.   HENT:  Negative for congestion and dental problem.    Eyes:  Negative for visual disturbance.   Respiratory:  Negative for chest tightness and shortness of breath.    Cardiovascular:  Negative for chest pain.   Gastrointestinal:  Negative for abdominal distention and abdominal pain.   Genitourinary:  Positive for difficulty urinating, frequency and urgency. Negative for decreased urine volume, dysuria, enuresis, flank pain, genital sores, hematuria, penile discharge, penile pain, penile swelling, scrotal swelling and testicular pain.   Musculoskeletal:  Negative for back pain and neck pain.   Skin:  Negative for color change.   Neurological:  Negative for dizziness.   Hematological:  Negative for adenopathy.   Psychiatric/Behavioral:  Negative for agitation, behavioral problems and confusion.        Physical Exam:  Physical Exam  Vitals  and nursing note reviewed.   Constitutional:       Appearance: He is well-developed.   HENT:      Head: Normocephalic.   Eyes:      Pupils: Pupils are equal, round, and reactive to light.   Cardiovascular:      Rate and Rhythm: Normal rate and regular rhythm.      Heart sounds: Normal heart sounds.   Pulmonary:      Effort: Pulmonary effort is normal.      Breath sounds: Normal breath sounds.   Abdominal:      General: Bowel sounds are normal.      Palpations: Abdomen is soft.   Musculoskeletal:         General: Normal range of motion.      Cervical back: Normal range of motion and neck supple.   Skin:     General: Skin is warm and dry.   Neurological:      Mental Status: He is alert and oriented to person, place, and time.   Psychiatric:         Behavior: Behavior normal.       Urinalysis:  Normal   Bladder scan:  151 cc    Assessment/Plan:   Difficulty voiding/frequency/urgency/nocturia:  Patient bladder scan is elevated 151 cc.    Will start patient on Flomax 0.4 mg daily.    Plan follow-up in 2-3 months for re-evaluation, sooner if needed.    Problem List Items Addressed This Visit    None  Visit Diagnoses       Urinary frequency    -  Primary    Relevant Medications    tamsulosin (FLOMAX) 0.4 mg Cap    Other Relevant Orders    POCT Urinalysis(Instrument)    POCT Bladder Scan    BPH with urinary obstruction        Relevant Medications    tamsulosin (FLOMAX) 0.4 mg Cap    Other Relevant Orders    POCT Urinalysis(Instrument)    POCT Bladder Scan    Urinary urgency        Relevant Medications    tamsulosin (FLOMAX) 0.4 mg Cap    Other Relevant Orders    POCT Urinalysis(Instrument)    POCT Bladder Scan    Nocturia        Relevant Medications    tamsulosin (FLOMAX) 0.4 mg Cap    Other Relevant Orders    POCT Urinalysis(Instrument)    POCT Bladder Scan

## 2023-08-19 DIAGNOSIS — C71.9 GLIOMA: ICD-10-CM

## 2023-08-19 DIAGNOSIS — D49.6 BRAIN TUMOR: ICD-10-CM

## 2023-08-22 ENCOUNTER — TELEPHONE (OUTPATIENT)
Dept: HEMATOLOGY/ONCOLOGY | Facility: CLINIC | Age: 48
End: 2023-08-22
Payer: COMMERCIAL

## 2023-08-22 DIAGNOSIS — C71.9 GLIOMA: ICD-10-CM

## 2023-08-22 RX ORDER — TEMOZOLOMIDE 140 MG/1
140 CAPSULE ORAL DAILY
Qty: 5 CAPSULE | Refills: 6 | Status: SHIPPED | OUTPATIENT
Start: 2023-08-22 | End: 2023-08-27

## 2023-08-23 RX ORDER — TEMOZOLOMIDE 250 MG/1
CAPSULE ORAL
Qty: 5 CAPSULE | Refills: 0 | Status: SHIPPED | OUTPATIENT
Start: 2023-08-23 | End: 2023-09-11

## 2023-08-23 RX ORDER — TEMOZOLOMIDE 140 MG/1
CAPSULE ORAL
Qty: 5 CAPSULE | Refills: 0 | Status: SHIPPED | OUTPATIENT
Start: 2023-08-23 | End: 2024-01-17 | Stop reason: SDUPTHER

## 2023-09-01 LAB
ALBUMIN SERPL BCP-MCNC: 4.2 G/DL (ref 3.4–5)
ALBUMIN/GLOBULIN RATIO: 1.1 RATIO (ref 1.1–1.8)
ALP SERPL-CCNC: 62 U/L (ref 46–116)
ALT SERPL W P-5'-P-CCNC: 36 U/L (ref 12–78)
ANION GAP SERPL CALC-SCNC: 9 MMOL/L (ref 3–11)
AST SERPL-CCNC: 23 U/L (ref 15–37)
BASOPHILS NFR BLD: 0.6 % (ref 0–3)
BILIRUB SERPL-MCNC: 0.6 MG/DL (ref 0–1)
BUN SERPL-MCNC: 9 MG/DL (ref 7–18)
BUN/CREAT SERPL: 8.65 RATIO (ref 7–18)
CALCIUM SERPL-MCNC: 9 MG/DL (ref 8.8–10.5)
CHLORIDE SERPL-SCNC: 102 MMOL/L (ref 100–108)
CO2 SERPL-SCNC: 27 MMOL/L (ref 21–32)
CREAT SERPL-MCNC: 1.04 MG/DL (ref 0.7–1.3)
EOSINOPHIL NFR BLD: 3.4 % (ref 1–3)
ERYTHROCYTE [DISTWIDTH] IN BLOOD BY AUTOMATED COUNT: 14.4 % (ref 12.5–18)
GFR ESTIMATION: > 60
GLOBULIN: 3.8 G/DL (ref 2.3–3.5)
GLUCOSE SERPL-MCNC: 100 MG/DL (ref 70–110)
HCT VFR BLD AUTO: 41.5 % (ref 42–52)
HGB BLD-MCNC: 14.2 G/DL (ref 14–18)
LYMPHOCYTES NFR BLD: 15.5 % (ref 25–40)
MCH RBC QN AUTO: 28.3 PG (ref 27–31.2)
MCHC RBC AUTO-ENTMCNC: 34.2 G/DL (ref 31.8–35.4)
MCV RBC AUTO: 82.7 FL (ref 80–97)
MONOCYTES NFR BLD: 10.9 % (ref 1–15)
NEUTROPHILS # BLD AUTO: 3.31 10*3/UL (ref 1.8–7.7)
NEUTROPHILS NFR BLD: 69.4 % (ref 37–80)
NUCLEATED RED BLOOD CELLS: 0 %
PLATELETS: 435 10*3/UL (ref 142–424)
POTASSIUM SERPL-SCNC: 3.4 MMOL/L (ref 3.6–5.2)
PROT SERPL-MCNC: 8 G/DL (ref 6.4–8.2)
RBC # BLD AUTO: 5.02 10*6/UL (ref 4.7–6.1)
SODIUM BLD-SCNC: 138 MMOL/L (ref 135–145)
WBC # BLD: 4.8 10*3/UL (ref 4.6–10.2)

## 2023-09-05 ENCOUNTER — OFFICE VISIT (OUTPATIENT)
Dept: HEMATOLOGY/ONCOLOGY | Facility: CLINIC | Age: 48
End: 2023-09-05
Payer: COMMERCIAL

## 2023-09-05 VITALS
OXYGEN SATURATION: 94 % | BODY MASS INDEX: 24.43 KG/M2 | SYSTOLIC BLOOD PRESSURE: 127 MMHG | WEIGHT: 170.63 LBS | DIASTOLIC BLOOD PRESSURE: 78 MMHG | RESPIRATION RATE: 18 BRPM | HEART RATE: 91 BPM | HEIGHT: 70 IN

## 2023-09-05 DIAGNOSIS — C71.9 GLIOMA: Primary | ICD-10-CM

## 2023-09-05 DIAGNOSIS — R11.2 CHEMOTHERAPY-INDUCED NAUSEA AND VOMITING: ICD-10-CM

## 2023-09-05 DIAGNOSIS — T45.1X5A CHEMOTHERAPY-INDUCED NAUSEA AND VOMITING: ICD-10-CM

## 2023-09-05 PROCEDURE — 99214 PR OFFICE/OUTPT VISIT, EST, LEVL IV, 30-39 MIN: ICD-10-PCS | Mod: S$GLB,,, | Performed by: NURSE PRACTITIONER

## 2023-09-05 PROCEDURE — 3078F PR MOST RECENT DIASTOLIC BLOOD PRESSURE < 80 MM HG: ICD-10-PCS | Mod: CPTII,S$GLB,, | Performed by: NURSE PRACTITIONER

## 2023-09-05 PROCEDURE — 3074F PR MOST RECENT SYSTOLIC BLOOD PRESSURE < 130 MM HG: ICD-10-PCS | Mod: CPTII,S$GLB,, | Performed by: NURSE PRACTITIONER

## 2023-09-05 PROCEDURE — 1159F PR MEDICATION LIST DOCUMENTED IN MEDICAL RECORD: ICD-10-PCS | Mod: CPTII,S$GLB,, | Performed by: NURSE PRACTITIONER

## 2023-09-05 PROCEDURE — 3074F SYST BP LT 130 MM HG: CPT | Mod: CPTII,S$GLB,, | Performed by: NURSE PRACTITIONER

## 2023-09-05 PROCEDURE — 3008F BODY MASS INDEX DOCD: CPT | Mod: CPTII,S$GLB,, | Performed by: NURSE PRACTITIONER

## 2023-09-05 PROCEDURE — 99214 OFFICE O/P EST MOD 30 MIN: CPT | Mod: S$GLB,,, | Performed by: NURSE PRACTITIONER

## 2023-09-05 PROCEDURE — 3008F PR BODY MASS INDEX (BMI) DOCUMENTED: ICD-10-PCS | Mod: CPTII,S$GLB,, | Performed by: NURSE PRACTITIONER

## 2023-09-05 PROCEDURE — 3078F DIAST BP <80 MM HG: CPT | Mod: CPTII,S$GLB,, | Performed by: NURSE PRACTITIONER

## 2023-09-05 PROCEDURE — 1159F MED LIST DOCD IN RCRD: CPT | Mod: CPTII,S$GLB,, | Performed by: NURSE PRACTITIONER

## 2023-09-05 NOTE — PROGRESS NOTES
MEDICAL ONCOLOGY FOLLOW UP CONSULTATION NOTE    Patient ID: Marvin Pool is a 47 y.o. male.    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  He also completed chemo XRT and now is currently on adjuvant Temodar.  He would like to establish care here locally in [place] and voices no acute complaints reporting that he is tolerated Temodar without any side effects at this time.  He presents to our clinic today for further evaluation        Imaging:       MRI brain:  01/12/23    Expected immediate postsurgical changes of left frontal craniotomy for frontal mass resection.     Persistent area of T2 FLAIR hyperintense signal surrounding the resection is similar size compared to preoperative MRI. Typical thin peripheral enhancement around the resection cavity.        Past Medical History:   Diagnosis Date    Brain tumor 1/12/2023    Seizures 1/12/2023     Family History   Problem Relation Age of Onset    No Known Problems Mother     No Known Problems Father     No Known Problems Sister     No Known Problems Brother     No Known Problems Maternal Aunt      Social History     Socioeconomic History    Marital status:    Tobacco Use    Smoking status: Never    Smokeless tobacco: Never   Substance and Sexual Activity    Alcohol use: Not Currently     Past Surgical History:   Procedure Laterality Date    CRANIOTOMY USING FRAMELESS STEREOTAXY Left 1/12/2023    Procedure: CRANIOTOMY, USING FRAMELESS STEREOTAXY;  Surgeon: Wellington Park MD;  Location: [facility] OR [address] [place] [address];  Service: Neurosurgery;  Laterality: Left;         Review of systems:  Review of Systems   Constitutional:  Negative for activity change, appetite change, chills, diaphoresis, fatigue and unexpected weight change.   HENT:  Negative for congestion, facial swelling, hearing loss, mouth  sores, trouble swallowing and voice change.    Eyes:  Negative for photophobia, pain, discharge and itching.   Respiratory:  Negative for apnea, cough, choking, chest tightness and shortness of breath.    Cardiovascular:  Negative for chest pain, palpitations and leg swelling.   Gastrointestinal:  Negative for abdominal distention, abdominal pain, anal bleeding and blood in stool.   Endocrine: Negative for cold intolerance, heat intolerance, polydipsia and polyphagia.   Genitourinary:  Negative for difficulty urinating, dysuria, flank pain and hematuria.   Musculoskeletal:  Negative for arthralgias, back pain, joint swelling, myalgias, neck pain and neck stiffness.   Skin:  Negative for color change, pallor and wound.   Allergic/Immunologic: Negative for environmental allergies, food allergies and immunocompromised state.   Neurological:  Negative for dizziness, seizures, facial asymmetry, speech difficulty, light-headedness, numbness and headaches.   Hematological:  Negative for adenopathy. Does not bruise/bleed easily.   Psychiatric/Behavioral:  Negative for agitation, behavioral problems, confusion, decreased concentration and sleep disturbance.            Physical Exam  Vitals and nursing note reviewed.   Constitutional:       General: He is not in acute distress.     Appearance: Normal appearance. He is not ill-appearing.   HENT:      Head: Normocephalic and atraumatic.      Nose: No congestion or rhinorrhea.   Eyes:      General: No scleral icterus.     Extraocular Movements: Extraocular movements intact.      Pupils: Pupils are equal, round, and reactive to light.   Cardiovascular:      Rate and Rhythm: Normal rate and regular rhythm.      Pulses: Normal pulses.      Heart sounds: Normal heart sounds. No murmur heard.     No gallop.   Pulmonary:      Effort: Pulmonary effort is normal. No respiratory distress.      Breath sounds: Normal breath sounds. No stridor. No wheezing or rhonchi.   Abdominal:       General: Bowel sounds are normal. There is no distension.      Palpations: There is no mass.      Tenderness: There is no abdominal tenderness. There is no guarding.   Musculoskeletal:         General: No swelling, tenderness, deformity or signs of injury. Normal range of motion.      Cervical back: Normal range of motion and neck supple. No rigidity. No muscular tenderness.      Right lower leg: No edema.      Left lower leg: No edema.   Skin:     General: Skin is warm.      Coloration: Skin is not jaundiced or pale.      Findings: No bruising or lesion.   Neurological:      General: No focal deficit present.      Mental Status: He is alert and oriented to person, place, and time.      Cranial Nerves: No cranial nerve deficit.      Sensory: No sensory deficit.      Motor: No weakness.      Gait: Gait normal.   Psychiatric:         Mood and Affect: Mood normal.         Behavior: Behavior normal.         Thought Content: Thought content normal.               Vitals:    09/05/23 1418   BP: 127/78   Pulse: 91   Resp: 18     Body surface area is 1.96 meters squared.    Assessment/Plan:      ECOG 1    Glioma :    == Grade 2 glioma of the left frontal lobe status post subtotal resection in January of 2023.   == s/p Chemo-XRT completed recently and now on adjuvant Temodar tolerating well.  == I will add Bactrim DS for PCP prohylaxis at this time and see patient prior to C#2  == 7/10/23: Labs reviewed. Tolerating Bactrim DS well with no dose limiting toxicities. Voices no complaints. Next MRI brain in Aug 2023  ==08/07/2023: First cycle of adjuvant Temozolomide well tolerated, patient has some mild nausea relieved with phenergan prn. He is not taking ondansetron states phenergan works better.  Temozolomide dosage is 390mg days 1-5 q 28 days.  Reports good compliance with bactrim for pcp prophylaxis. MRI Brain reviewed Next MRI brain is scheduled November 6th 2023.  No headaches, visual changes. Good compliance with Keppra,  will check keppra level with labs.  ==09/05/2023: Patient is s/p 2 cycles of adjuvant Temozolomide, has intermittent nausea relieved with phenergan.  No s/s infection, is on bactrim for pcp prophylaxis.  Plan to continue adjuvant Temozolomide 6-12 cycles with MRI brain on 11/6/23 has set up per radiation oncology. Keppra level not drawn, will add to orders    Plan:  Ok to procede with cycle 3 Temozolomide  RTC 4 weeks with cbc cmp keppra level  Continue Bactrim DS for PCP prophylaxis  MRI brain with and without contrast prior - has already been ordered per Dr. Carson, scheduled 11/6/23               Total time spent in counseling and discussion about further management options including relevant lab work, treatment,  prognosis, medications and intended side effects was more than 25 minutes. More than 50% of the time was spent on counseling and coordination of care.  I spent a total of 25 minutes on the day of the visit.This includes face to face time and non-face to face time preparing to see the patient (eg, review of tests), Obtaining and/or reviewing separately obtained history, Documenting clinical information in the electronic or other health record, Independently interpreting resultsand communicating results to the patient/family/caregiver, or Care coordination.

## 2023-09-08 ENCOUNTER — PATIENT MESSAGE (OUTPATIENT)
Dept: NEUROSURGERY | Facility: CLINIC | Age: 48
End: 2023-09-08
Payer: COMMERCIAL

## 2023-09-08 ENCOUNTER — TELEPHONE (OUTPATIENT)
Dept: NEUROSURGERY | Facility: CLINIC | Age: 48
End: 2023-09-08
Payer: COMMERCIAL

## 2023-09-08 DIAGNOSIS — C71.9 GLIOMA: Primary | ICD-10-CM

## 2023-09-08 DIAGNOSIS — D49.6 BRAIN TUMOR: ICD-10-CM

## 2023-09-08 NOTE — TELEPHONE ENCOUNTER
S/W pt. Ins denied MRI at [facility]. Pt states he can go to [facility]. Turns out, it is now part of [facility].    Called and faxed the orders to [phone].    Called pt. Advised to call them to schedule and msg us when he has a date so we know what to do with his appt Tuesday.    Also advised to bring on disc in case we can't see it. He v/u and agreed to plan.

## 2023-09-11 ENCOUNTER — PATIENT MESSAGE (OUTPATIENT)
Dept: NEUROSURGERY | Facility: CLINIC | Age: 48
End: 2023-09-11
Payer: COMMERCIAL

## 2023-09-12 ENCOUNTER — OFFICE VISIT (OUTPATIENT)
Dept: NEUROSURGERY | Facility: CLINIC | Age: 48
End: 2023-09-12
Payer: COMMERCIAL

## 2023-09-12 ENCOUNTER — PATIENT MESSAGE (OUTPATIENT)
Dept: NEUROSURGERY | Facility: CLINIC | Age: 48
End: 2023-09-12

## 2023-09-12 ENCOUNTER — TELEPHONE (OUTPATIENT)
Dept: NEUROSURGERY | Facility: CLINIC | Age: 48
End: 2023-09-12
Payer: COMMERCIAL

## 2023-09-12 VITALS
BODY MASS INDEX: 24.2 KG/M2 | HEART RATE: 75 BPM | SYSTOLIC BLOOD PRESSURE: 138 MMHG | HEIGHT: 70 IN | DIASTOLIC BLOOD PRESSURE: 87 MMHG | WEIGHT: 169 LBS

## 2023-09-12 DIAGNOSIS — D49.6 BRAIN TUMOR: ICD-10-CM

## 2023-09-12 DIAGNOSIS — C71.9 GLIOMA: Primary | ICD-10-CM

## 2023-09-12 DIAGNOSIS — C71.9 OLIGODENDROGLIOMA: ICD-10-CM

## 2023-09-12 PROCEDURE — 3079F DIAST BP 80-89 MM HG: CPT | Mod: CPTII,S$GLB,, | Performed by: NEUROLOGICAL SURGERY

## 2023-09-12 PROCEDURE — 3075F SYST BP GE 130 - 139MM HG: CPT | Mod: CPTII,S$GLB,, | Performed by: NEUROLOGICAL SURGERY

## 2023-09-12 PROCEDURE — 99214 PR OFFICE/OUTPT VISIT, EST, LEVL IV, 30-39 MIN: ICD-10-PCS | Mod: S$GLB,,, | Performed by: NEUROLOGICAL SURGERY

## 2023-09-12 PROCEDURE — 99999 PR PBB SHADOW E&M-EST. PATIENT-LVL III: CPT | Mod: PBBFAC,,, | Performed by: NEUROLOGICAL SURGERY

## 2023-09-12 PROCEDURE — 99999 PR PBB SHADOW E&M-EST. PATIENT-LVL III: ICD-10-PCS | Mod: PBBFAC,,, | Performed by: NEUROLOGICAL SURGERY

## 2023-09-12 PROCEDURE — 3008F BODY MASS INDEX DOCD: CPT | Mod: CPTII,S$GLB,, | Performed by: NEUROLOGICAL SURGERY

## 2023-09-12 PROCEDURE — 3079F PR MOST RECENT DIASTOLIC BLOOD PRESSURE 80-89 MM HG: ICD-10-PCS | Mod: CPTII,S$GLB,, | Performed by: NEUROLOGICAL SURGERY

## 2023-09-12 PROCEDURE — 1159F PR MEDICATION LIST DOCUMENTED IN MEDICAL RECORD: ICD-10-PCS | Mod: CPTII,S$GLB,, | Performed by: NEUROLOGICAL SURGERY

## 2023-09-12 PROCEDURE — 1160F RVW MEDS BY RX/DR IN RCRD: CPT | Mod: CPTII,S$GLB,, | Performed by: NEUROLOGICAL SURGERY

## 2023-09-12 PROCEDURE — 3008F PR BODY MASS INDEX (BMI) DOCUMENTED: ICD-10-PCS | Mod: CPTII,S$GLB,, | Performed by: NEUROLOGICAL SURGERY

## 2023-09-12 PROCEDURE — 1160F PR REVIEW ALL MEDS BY PRESCRIBER/CLIN PHARMACIST DOCUMENTED: ICD-10-PCS | Mod: CPTII,S$GLB,, | Performed by: NEUROLOGICAL SURGERY

## 2023-09-12 PROCEDURE — 1159F MED LIST DOCD IN RCRD: CPT | Mod: CPTII,S$GLB,, | Performed by: NEUROLOGICAL SURGERY

## 2023-09-12 PROCEDURE — 99214 OFFICE O/P EST MOD 30 MIN: CPT | Mod: S$GLB,,, | Performed by: NEUROLOGICAL SURGERY

## 2023-09-12 PROCEDURE — 3075F PR MOST RECENT SYSTOLIC BLOOD PRESS GE 130-139MM HG: ICD-10-PCS | Mod: CPTII,S$GLB,, | Performed by: NEUROLOGICAL SURGERY

## 2023-09-12 NOTE — PROGRESS NOTES
Subjective:   I, Bonnie Ly, attest that this documentation has been prepared under the direction and in the presence of Wellington Park MD.     Patient ID: Marvin Pool is a 47 y.o. male     Chief Complaint: No chief complaint on file.      HPI  Mr. Marvin Pool is a 47 y.o. 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 At the time of our last visit on 5/2/2023, the pt reports he continues to do well in the interim. Pt with no new complaints or concerns. He continues to maintain his energy levels without complication.    Today the pt reports he is doing well overall. Good performance status. No further complaints reported at this time. Of note, he reports intermittent thought blocking. I have recommend increasing the pt's Keppra, however he would like to remain at his normal dose at this time.    Review of Systems   Constitutional:  Negative for activity change, appetite change, fatigue, fever and unexpected weight change.   HENT:  Negative for facial swelling.    Eyes: Negative.    Respiratory: Negative.     Cardiovascular: Negative.    Gastrointestinal:  Negative for diarrhea, nausea and vomiting.   Endocrine: Negative.    Genitourinary: Negative.    Musculoskeletal:  Negative for back pain, joint swelling, myalgias and neck pain.   Neurological:  Negative for dizziness, seizures, weakness, numbness and headaches.   Psychiatric/Behavioral: Negative.          Past Medical History:   Diagnosis Date    Brain tumor 1/12/2023    Seizures 1/12/2023       Objective:      Vitals:    09/12/23 1019   BP: 138/87   Pulse: 75      Physical Exam  Constitutional:       General: He is not in acute distress.     Appearance: Normal appearance.   HENT:      Head: Normocephalic and atraumatic.   Pulmonary:      Effort: Pulmonary effort is normal.   Musculoskeletal:      Cervical back: Neck  supple.   Neurological:      Mental Status: He is alert and oriented to person, place, and time.      GCS: GCS eye subscore is 4. GCS verbal subscore is 5. GCS motor subscore is 6.      Cranial Nerves: No cranial nerve deficit.          IMAGING:  MRI Previous (9/12/2023):  Postsurgical changes of left frontal craniotomy for frontal mass resection. Persistent area of T2 FLAIR hyperintense signal surrounding the resection is similar size compared to previous MRI    I have personally reviewed the images with the pt.      I, Dr. Wellington Park, personally performed the services described in this documentation. All medical record entries made by the scribe, Bonnie Ly, were at my direction and in my presence.  I have reviewed the chart and agree that the record reflects my personal performance and is accurate and complete. Wellington Park MD. 09/12/2023    Assessment:       CNS WHO Grade 2 s/p left frontal craniotomy     Plan:   I have personally reviewed the MRI previous with the pt which shows postsurgical changes of left frontal craniotomy for frontal mass resection. Persistent area of T2 FLAIR hyperintense signal surrounding the resection is similar size compared to previous MRI.    I will schedule the patient for 4 month follow up with MRI brain via virtual visit.

## 2023-09-12 NOTE — PATIENT INSTRUCTIONS
I have personally reviewed the MRI previous with the pt which shows postsurgical changes of left frontal craniotomy for frontal mass resection. Persistent area of T2 FLAIR hyperintense signal surrounding the resection is similar size compared to previous MRI.    I will schedule the patient for 4 month follow up with MRI brain via virtual visit.

## 2023-10-03 DIAGNOSIS — C71.9 GLIOMA: ICD-10-CM

## 2023-10-03 DIAGNOSIS — D49.6 BRAIN TUMOR: ICD-10-CM

## 2023-10-03 RX ORDER — TEMOZOLOMIDE 250 MG/1
CAPSULE ORAL
Qty: 5 CAPSULE | Refills: 0 | Status: SHIPPED | OUTPATIENT
Start: 2023-10-03 | End: 2023-10-27

## 2023-10-06 LAB
ALBUMIN SERPL BCP-MCNC: 4.1 G/DL (ref 3.4–5)
ALBUMIN/GLOBULIN RATIO: 1.28 RATIO (ref 1.1–1.8)
ALP SERPL-CCNC: 70 U/L (ref 46–116)
ALT SERPL W P-5'-P-CCNC: 33 U/L (ref 12–78)
ANION GAP SERPL CALC-SCNC: 9 MMOL/L (ref 3–11)
AST SERPL-CCNC: 21 U/L (ref 15–37)
BASOPHILS NFR BLD: 1 % (ref 0–3)
BILIRUB SERPL-MCNC: 0.5 MG/DL (ref 0–1)
BUN SERPL-MCNC: 16 MG/DL (ref 7–18)
BUN/CREAT SERPL: 15.53 RATIO (ref 7–18)
CALCIUM SERPL-MCNC: 9.2 MG/DL (ref 8.8–10.5)
CHLORIDE SERPL-SCNC: 101 MMOL/L (ref 100–108)
CO2 SERPL-SCNC: 27 MMOL/L (ref 21–32)
CREAT SERPL-MCNC: 1.03 MG/DL (ref 0.7–1.3)
EOSINOPHIL NFR BLD: 2.9 % (ref 1–3)
ERYTHROCYTE [DISTWIDTH] IN BLOOD BY AUTOMATED COUNT: 13.2 % (ref 12.5–18)
GFR ESTIMATION: > 60
GLOBULIN: 3.2 G/DL (ref 2.3–3.5)
GLUCOSE SERPL-MCNC: 97 MG/DL (ref 70–110)
HCT VFR BLD AUTO: 42 % (ref 42–52)
HGB BLD-MCNC: 14.2 G/DL (ref 14–18)
LYMPHOCYTES NFR BLD: 16.9 % (ref 25–40)
MCH RBC QN AUTO: 28.6 PG (ref 27–31.2)
MCHC RBC AUTO-ENTMCNC: 33.8 G/DL (ref 31.8–35.4)
MCV RBC AUTO: 84.7 FL (ref 80–97)
MONOCYTES NFR BLD: 13.3 % (ref 1–15)
NEUTROPHILS # BLD AUTO: 3.42 10*3/UL (ref 1.8–7.7)
NEUTROPHILS NFR BLD: 65.7 % (ref 37–80)
NUCLEATED RED BLOOD CELLS: 0 %
PLATELETS: 363 10*3/UL (ref 142–424)
POTASSIUM SERPL-SCNC: 4.3 MMOL/L (ref 3.6–5.2)
PROT SERPL-MCNC: 7.3 G/DL (ref 6.4–8.2)
RBC # BLD AUTO: 4.96 10*6/UL (ref 4.7–6.1)
SODIUM BLD-SCNC: 137 MMOL/L (ref 135–145)
WBC # BLD: 5.2 10*3/UL (ref 4.6–10.2)

## 2023-10-09 ENCOUNTER — OFFICE VISIT (OUTPATIENT)
Dept: HEMATOLOGY/ONCOLOGY | Facility: CLINIC | Age: 48
End: 2023-10-09
Payer: COMMERCIAL

## 2023-10-09 VITALS
OXYGEN SATURATION: 96 % | SYSTOLIC BLOOD PRESSURE: 125 MMHG | RESPIRATION RATE: 18 BRPM | WEIGHT: 170.38 LBS | HEIGHT: 70 IN | DIASTOLIC BLOOD PRESSURE: 79 MMHG | BODY MASS INDEX: 24.39 KG/M2 | HEART RATE: 72 BPM

## 2023-10-09 DIAGNOSIS — C71.9 GLIOMA: Primary | ICD-10-CM

## 2023-10-09 DIAGNOSIS — T45.1X5A CHEMOTHERAPY-INDUCED NAUSEA AND VOMITING: ICD-10-CM

## 2023-10-09 DIAGNOSIS — R11.2 CHEMOTHERAPY-INDUCED NAUSEA AND VOMITING: ICD-10-CM

## 2023-10-09 PROCEDURE — 3078F PR MOST RECENT DIASTOLIC BLOOD PRESSURE < 80 MM HG: ICD-10-PCS | Mod: CPTII,S$GLB,, | Performed by: NURSE PRACTITIONER

## 2023-10-09 PROCEDURE — 3008F BODY MASS INDEX DOCD: CPT | Mod: CPTII,S$GLB,, | Performed by: NURSE PRACTITIONER

## 2023-10-09 PROCEDURE — 3074F PR MOST RECENT SYSTOLIC BLOOD PRESSURE < 130 MM HG: ICD-10-PCS | Mod: CPTII,S$GLB,, | Performed by: NURSE PRACTITIONER

## 2023-10-09 PROCEDURE — 1159F PR MEDICATION LIST DOCUMENTED IN MEDICAL RECORD: ICD-10-PCS | Mod: CPTII,S$GLB,, | Performed by: NURSE PRACTITIONER

## 2023-10-09 PROCEDURE — 99214 OFFICE O/P EST MOD 30 MIN: CPT | Mod: S$GLB,,, | Performed by: NURSE PRACTITIONER

## 2023-10-09 PROCEDURE — 3078F DIAST BP <80 MM HG: CPT | Mod: CPTII,S$GLB,, | Performed by: NURSE PRACTITIONER

## 2023-10-09 PROCEDURE — 99214 PR OFFICE/OUTPT VISIT, EST, LEVL IV, 30-39 MIN: ICD-10-PCS | Mod: S$GLB,,, | Performed by: NURSE PRACTITIONER

## 2023-10-09 PROCEDURE — 3008F PR BODY MASS INDEX (BMI) DOCUMENTED: ICD-10-PCS | Mod: CPTII,S$GLB,, | Performed by: NURSE PRACTITIONER

## 2023-10-09 PROCEDURE — 3074F SYST BP LT 130 MM HG: CPT | Mod: CPTII,S$GLB,, | Performed by: NURSE PRACTITIONER

## 2023-10-09 PROCEDURE — 1159F MED LIST DOCD IN RCRD: CPT | Mod: CPTII,S$GLB,, | Performed by: NURSE PRACTITIONER

## 2023-10-09 NOTE — PROGRESS NOTES
MEDICAL ONCOLOGY FOLLOW UP CONSULTATION NOTE    Patient ID: Marvin Pool is a 47 y.o. male.    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  He also completed chemo XRT and now is currently on adjuvant Temodar.  He would like to establish care here locally in [place] and voices no acute complaints reporting that he is tolerated Temodar without any side effects at this time.  He presents to our clinic today for further evaluation        Imaging:       MRI brain:  01/12/23    Expected immediate postsurgical changes of left frontal craniotomy for frontal mass resection.     Persistent area of T2 FLAIR hyperintense signal surrounding the resection is similar size compared to preoperative MRI. Typical thin peripheral enhancement around the resection cavity.        Past Medical History:   Diagnosis Date    Brain tumor 1/12/2023    Seizures 1/12/2023     Family History   Problem Relation Age of Onset    No Known Problems Mother     No Known Problems Father     No Known Problems Sister     No Known Problems Brother     No Known Problems Maternal Aunt      Social History     Socioeconomic History    Marital status:    Tobacco Use    Smoking status: Never    Smokeless tobacco: Never   Substance and Sexual Activity    Alcohol use: Not Currently     Past Surgical History:   Procedure Laterality Date    CRANIOTOMY USING FRAMELESS STEREOTAXY Left 1/12/2023    Procedure: CRANIOTOMY, USING FRAMELESS STEREOTAXY;  Surgeon: Wellington Park MD;  Location: [facility] OR [address] [place] [address];  Service: Neurosurgery;  Laterality: Left;         Review of systems:  Review of Systems   Constitutional:  Negative for activity change, appetite change, chills, diaphoresis, fatigue and unexpected weight change.   HENT:  Negative for congestion, facial swelling, hearing loss, mouth  sores, trouble swallowing and voice change.    Eyes:  Negative for photophobia, pain, discharge and itching.   Respiratory:  Negative for apnea, cough, choking, chest tightness and shortness of breath.    Cardiovascular:  Negative for chest pain, palpitations and leg swelling.   Gastrointestinal:  Negative for abdominal distention, abdominal pain, anal bleeding and blood in stool.   Endocrine: Negative for cold intolerance, heat intolerance, polydipsia and polyphagia.   Genitourinary:  Negative for difficulty urinating, dysuria, flank pain and hematuria.   Musculoskeletal:  Negative for arthralgias, back pain, joint swelling, myalgias, neck pain and neck stiffness.   Skin:  Negative for color change, pallor and wound.   Allergic/Immunologic: Negative for environmental allergies, food allergies and immunocompromised state.   Neurological:  Negative for dizziness, seizures, facial asymmetry, speech difficulty, light-headedness, numbness and headaches.   Hematological:  Negative for adenopathy. Does not bruise/bleed easily.   Psychiatric/Behavioral:  Negative for agitation, behavioral problems, confusion, decreased concentration and sleep disturbance.            Physical Exam  Vitals and nursing note reviewed.   Constitutional:       General: He is not in acute distress.     Appearance: Normal appearance. He is not ill-appearing.   HENT:      Head: Normocephalic and atraumatic.      Nose: No congestion or rhinorrhea.   Eyes:      General: No scleral icterus.     Extraocular Movements: Extraocular movements intact.      Pupils: Pupils are equal, round, and reactive to light.   Cardiovascular:      Rate and Rhythm: Normal rate and regular rhythm.      Pulses: Normal pulses.      Heart sounds: Normal heart sounds. No murmur heard.     No gallop.   Pulmonary:      Effort: Pulmonary effort is normal. No respiratory distress.      Breath sounds: Normal breath sounds. No stridor. No wheezing or rhonchi.   Abdominal:       General: Bowel sounds are normal. There is no distension.      Palpations: There is no mass.      Tenderness: There is no abdominal tenderness. There is no guarding.   Musculoskeletal:         General: No swelling, tenderness, deformity or signs of injury. Normal range of motion.      Cervical back: Normal range of motion and neck supple. No rigidity. No muscular tenderness.      Right lower leg: No edema.      Left lower leg: No edema.   Skin:     General: Skin is warm.      Coloration: Skin is not jaundiced or pale.      Findings: No bruising or lesion.   Neurological:      General: No focal deficit present.      Mental Status: He is alert and oriented to person, place, and time.      Cranial Nerves: No cranial nerve deficit.      Sensory: No sensory deficit.      Motor: No weakness.      Gait: Gait normal.   Psychiatric:         Mood and Affect: Mood normal.         Behavior: Behavior normal.         Thought Content: Thought content normal.               Vitals:    10/09/23 1326   BP: 125/79   Pulse: 72   Resp: 18     Body surface area is 1.95 meters squared.    Assessment/Plan:      ECOG 1    Glioma :    == Grade 2 glioma of the left frontal lobe status post subtotal resection in January of 2023.   == s/p Chemo-XRT completed recently and now on adjuvant Temodar tolerating well.  == I will add Bactrim DS for PCP prohylaxis at this time and see patient prior to C#2  == 7/10/23: Labs reviewed. Tolerating Bactrim DS well with no dose limiting toxicities. Voices no complaints. Next MRI brain in Aug 2023  ==08/07/2023: First cycle of adjuvant Temozolomide well tolerated, patient has some mild nausea relieved with phenergan prn. He is not taking ondansetron states phenergan works better.  Temozolomide dosage is 390mg days 1-5 q 28 days.  Reports good compliance with bactrim for pcp prophylaxis. MRI Brain reviewed Next MRI brain is scheduled November 6th 2023.  No headaches, visual changes. Good compliance with Keppra,  will check keppra level with labs.  ==09/05/2023: Patient is s/p 2 cycles of adjuvant Temozolomide, has intermittent nausea relieved with phenergan.  No s/s infection, is on bactrim for pcp prophylaxis.  Plan to continue adjuvant Temozolomide 6-12 cycles with MRI brain on 11/6/23 has set up per radiation oncology. Keppra level not drawn, will add to orders  ==10/09/2023: Patient doing well, recently saw neurosurgery with MRI brain which was noted to have continued stable residual disease.  Temozolomide well tolerated, reports occasional nausea relieved with ondansetron.  Will continue with Temozolomide, patient is cleared to start cycle 4 with 6-12 planned adjuvant cycles. Next MRI brain scheduled by neurosurgery in 4 months    Plan:  Ok to procede with cycle 4 Temozolomide  RTC 4 weeks with cbc cmp keppra level  Continue Bactrim DS for PCP prophylaxis  MRI brain with and without contrast - scheduled per neurosurgery               Total time spent in counseling and discussion about further management options including relevant lab work, treatment,  prognosis, medications and intended side effects was more than 25 minutes. More than 50% of the time was spent on counseling and coordination of care.  I spent a total of 25 minutes on the day of the visit.This includes face to face time and non-face to face time preparing to see the patient (eg, review of tests), Obtaining and/or reviewing separately obtained history, Documenting clinical information in the electronic or other health record, Independently interpreting resultsand communicating results to the patient/family/caregiver, or Care coordination.

## 2023-10-16 ENCOUNTER — PATIENT MESSAGE (OUTPATIENT)
Dept: NEUROSURGERY | Facility: CLINIC | Age: 48
End: 2023-10-16
Payer: COMMERCIAL

## 2023-10-27 DIAGNOSIS — D49.6 BRAIN TUMOR: ICD-10-CM

## 2023-10-27 DIAGNOSIS — C71.9 GLIOMA: ICD-10-CM

## 2023-10-27 RX ORDER — TEMOZOLOMIDE 250 MG/1
CAPSULE ORAL
Qty: 5 CAPSULE | Refills: 0 | Status: SHIPPED | OUTPATIENT
Start: 2023-10-27 | End: 2023-11-06 | Stop reason: SDUPTHER

## 2023-11-03 ENCOUNTER — TELEPHONE (OUTPATIENT)
Dept: HEMATOLOGY/ONCOLOGY | Facility: CLINIC | Age: 48
End: 2023-11-03

## 2023-11-03 LAB
ALBUMIN SERPL BCP-MCNC: 4 G/DL (ref 3.4–5)
ALBUMIN/GLOBULIN RATIO: 1.14 RATIO (ref 1.1–1.8)
ALP SERPL-CCNC: 65 U/L (ref 46–116)
ALT SERPL W P-5'-P-CCNC: 52 U/L (ref 12–78)
ANION GAP SERPL CALC-SCNC: 5 MMOL/L (ref 3–11)
AST SERPL-CCNC: 18 U/L (ref 15–37)
BANDS: 1 % (ref 0–5)
BILIRUB SERPL-MCNC: 0.5 MG/DL (ref 0–1)
BUN SERPL-MCNC: 12 MG/DL (ref 7–18)
BUN/CREAT SERPL: 11.32 RATIO (ref 7–18)
CALCIUM SERPL-MCNC: 9 MG/DL (ref 8.8–10.5)
CELLS COUNTED: 100
CHLORIDE SERPL-SCNC: 102 MMOL/L (ref 100–108)
CO2 SERPL-SCNC: 29 MMOL/L (ref 21–32)
CREAT SERPL-MCNC: 1.06 MG/DL (ref 0.7–1.3)
EOSINOPHIL NFR BLD: 3 % (ref 1–3)
ERYTHROCYTE [DISTWIDTH] IN BLOOD BY AUTOMATED COUNT: 13.1 % (ref 12.5–18)
GFR ESTIMATION: > 60
GLOBULIN: 3.5 G/DL (ref 2.3–3.5)
GLUCOSE SERPL-MCNC: 106 MG/DL (ref 70–110)
HCT VFR BLD AUTO: 41.9 % (ref 42–52)
HGB BLD-MCNC: 14.3 G/DL (ref 14–18)
LYMPHOCYTES NFR BLD: 16 % (ref 25–40)
MCH RBC QN AUTO: 28.7 PG (ref 27–31.2)
MCHC RBC AUTO-ENTMCNC: 34.1 G/DL (ref 31.8–35.4)
MCV RBC AUTO: 84 FL (ref 80–97)
MONOCYTES NFR BLD: 13 % (ref 1–15)
NEUTROPHILS # BLD AUTO: 3.3 10*3/UL (ref 1.8–7.7)
NEUTROPHILS NFR BLD: 67 % (ref 37–80)
NUCLEATED RED BLOOD CELLS: 0 %
PLATELETS: 287 10*3/UL (ref 142–424)
POTASSIUM SERPL-SCNC: 3.9 MMOL/L (ref 3.6–5.2)
PROT SERPL-MCNC: 7.5 G/DL (ref 6.4–8.2)
RBC # BLD AUTO: 4.99 10*6/UL (ref 4.7–6.1)
RBC MORPH BLD: ABNORMAL
SMALL PLATELETS BLD QL SMEAR: ADEQUATE
SODIUM BLD-SCNC: 136 MMOL/L (ref 135–145)
WBC # BLD: 4.9 10*3/UL (ref 4.6–10.2)

## 2023-11-03 NOTE — TELEPHONE ENCOUNTER
Spoke with pharmacy to clarify the dose ordered for patient. Verified the dose to be the 250mg in addition to the 140 mg capsule

## 2023-11-03 NOTE — TELEPHONE ENCOUNTER
----- Message from Catrachita Muñoz sent at 11/3/2023  1:53 PM CDT -----  Contact: [facility]  Type:  Pharmacy Calling to Clarify an RX    Pharmacy Name: [facility]   Prescription Name:temozolomide  What do they need to clarify?:needs to clarify refills from previous order for temozolomide  Best Call Back Number:[phone] (will ring directly to the pharmacist)  Additional Information: Reference ID # [id number] to mention when calling

## 2023-11-06 ENCOUNTER — OFFICE VISIT (OUTPATIENT)
Dept: HEMATOLOGY/ONCOLOGY | Facility: CLINIC | Age: 48
End: 2023-11-06
Payer: COMMERCIAL

## 2023-11-06 VITALS
HEIGHT: 70 IN | WEIGHT: 167.69 LBS | DIASTOLIC BLOOD PRESSURE: 72 MMHG | BODY MASS INDEX: 24.01 KG/M2 | RESPIRATION RATE: 18 BRPM | SYSTOLIC BLOOD PRESSURE: 110 MMHG

## 2023-11-06 DIAGNOSIS — R11.2 CHEMOTHERAPY-INDUCED NAUSEA AND VOMITING: ICD-10-CM

## 2023-11-06 DIAGNOSIS — D49.6 BRAIN TUMOR: ICD-10-CM

## 2023-11-06 DIAGNOSIS — C71.9 GLIOMA: Primary | ICD-10-CM

## 2023-11-06 DIAGNOSIS — T45.1X5A CHEMOTHERAPY-INDUCED NAUSEA AND VOMITING: ICD-10-CM

## 2023-11-06 PROCEDURE — 3008F BODY MASS INDEX DOCD: CPT | Mod: CPTII,S$GLB,, | Performed by: NURSE PRACTITIONER

## 2023-11-06 PROCEDURE — 3008F PR BODY MASS INDEX (BMI) DOCUMENTED: ICD-10-PCS | Mod: CPTII,S$GLB,, | Performed by: NURSE PRACTITIONER

## 2023-11-06 PROCEDURE — 3078F PR MOST RECENT DIASTOLIC BLOOD PRESSURE < 80 MM HG: ICD-10-PCS | Mod: CPTII,S$GLB,, | Performed by: NURSE PRACTITIONER

## 2023-11-06 PROCEDURE — 3078F DIAST BP <80 MM HG: CPT | Mod: CPTII,S$GLB,, | Performed by: NURSE PRACTITIONER

## 2023-11-06 PROCEDURE — 3074F SYST BP LT 130 MM HG: CPT | Mod: CPTII,S$GLB,, | Performed by: NURSE PRACTITIONER

## 2023-11-06 PROCEDURE — 99214 OFFICE O/P EST MOD 30 MIN: CPT | Mod: S$GLB,,, | Performed by: NURSE PRACTITIONER

## 2023-11-06 PROCEDURE — 99214 PR OFFICE/OUTPT VISIT, EST, LEVL IV, 30-39 MIN: ICD-10-PCS | Mod: S$GLB,,, | Performed by: NURSE PRACTITIONER

## 2023-11-06 PROCEDURE — 3074F PR MOST RECENT SYSTOLIC BLOOD PRESSURE < 130 MM HG: ICD-10-PCS | Mod: CPTII,S$GLB,, | Performed by: NURSE PRACTITIONER

## 2023-11-06 RX ORDER — TEMOZOLOMIDE 250 MG/1
CAPSULE ORAL
Qty: 5 CAPSULE | Refills: 0 | Status: SHIPPED | OUTPATIENT
Start: 2023-11-06 | End: 2023-11-06 | Stop reason: DRUGHIGH

## 2023-11-06 RX ORDER — TEMOZOLOMIDE 250 MG/1
CAPSULE ORAL
Qty: 5 CAPSULE | Refills: 0 | Status: SHIPPED | OUTPATIENT
Start: 2023-11-06 | End: 2023-11-06 | Stop reason: SDUPTHER

## 2023-11-06 RX ORDER — TEMOZOLOMIDE 250 MG/1
CAPSULE ORAL
Qty: 5 CAPSULE | Refills: 0 | Status: SHIPPED | OUTPATIENT
Start: 2023-11-06 | End: 2023-12-11

## 2023-11-06 NOTE — PROGRESS NOTES
MEDICAL ONCOLOGY FOLLOW UP CONSULTATION NOTE    Patient ID: Marvin Pool is a 48 y.o. male.    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  He also completed chemo XRT and now is currently on adjuvant Temodar.  He would like to establish care here locally in [place] and voices no acute complaints reporting that he is tolerated Temodar without any side effects at this time.  He presents to our clinic today for further evaluation        Imaging:       MRI brain:  01/12/23    Expected immediate postsurgical changes of left frontal craniotomy for frontal mass resection.     Persistent area of T2 FLAIR hyperintense signal surrounding the resection is similar size compared to preoperative MRI. Typical thin peripheral enhancement around the resection cavity.        Past Medical History:   Diagnosis Date    Brain tumor 1/12/2023    Seizures 1/12/2023     Family History   Problem Relation Age of Onset    No Known Problems Mother     No Known Problems Father     No Known Problems Sister     No Known Problems Brother     No Known Problems Maternal Aunt      Social History     Socioeconomic History    Marital status:    Tobacco Use    Smoking status: Never    Smokeless tobacco: Never   Substance and Sexual Activity    Alcohol use: Not Currently     Past Surgical History:   Procedure Laterality Date    CRANIOTOMY USING FRAMELESS STEREOTAXY Left 1/12/2023    Procedure: CRANIOTOMY, USING FRAMELESS STEREOTAXY;  Surgeon: Wellington Park MD;  Location: [facility] OR [address] [place] [address];  Service: Neurosurgery;  Laterality: Left;         Review of systems:  Review of Systems   Constitutional:  Negative for activity change, appetite change, chills, diaphoresis, fatigue and unexpected weight change.   HENT:  Negative for congestion, facial swelling, hearing loss, mouth  sores, trouble swallowing and voice change.    Eyes:  Negative for photophobia, pain, discharge and itching.   Respiratory:  Negative for apnea, cough, choking, chest tightness and shortness of breath.    Cardiovascular:  Negative for chest pain, palpitations and leg swelling.   Gastrointestinal:  Negative for abdominal distention, abdominal pain, anal bleeding and blood in stool.   Endocrine: Negative for cold intolerance, heat intolerance, polydipsia and polyphagia.   Genitourinary:  Negative for difficulty urinating, dysuria, flank pain and hematuria.   Musculoskeletal:  Negative for arthralgias, back pain, joint swelling, myalgias, neck pain and neck stiffness.   Skin:  Negative for color change, pallor and wound.   Allergic/Immunologic: Negative for environmental allergies, food allergies and immunocompromised state.   Neurological:  Negative for dizziness, seizures, facial asymmetry, speech difficulty, light-headedness, numbness and headaches.   Hematological:  Negative for adenopathy. Does not bruise/bleed easily.   Psychiatric/Behavioral:  Negative for agitation, behavioral problems, confusion, decreased concentration and sleep disturbance.            Physical Exam  Vitals and nursing note reviewed.   Constitutional:       General: He is not in acute distress.     Appearance: Normal appearance. He is not ill-appearing.   HENT:      Head: Normocephalic and atraumatic.      Nose: No congestion or rhinorrhea.   Eyes:      General: No scleral icterus.     Extraocular Movements: Extraocular movements intact.      Pupils: Pupils are equal, round, and reactive to light.   Cardiovascular:      Rate and Rhythm: Normal rate and regular rhythm.      Pulses: Normal pulses.      Heart sounds: Normal heart sounds. No murmur heard.     No gallop.   Pulmonary:      Effort: Pulmonary effort is normal. No respiratory distress.      Breath sounds: Normal breath sounds. No stridor. No wheezing or rhonchi.   Abdominal:       General: Bowel sounds are normal. There is no distension.      Palpations: There is no mass.      Tenderness: There is no abdominal tenderness. There is no guarding.   Musculoskeletal:         General: No swelling, tenderness, deformity or signs of injury. Normal range of motion.      Cervical back: Normal range of motion and neck supple. No rigidity. No muscular tenderness.      Right lower leg: No edema.      Left lower leg: No edema.   Skin:     General: Skin is warm.      Coloration: Skin is not jaundiced or pale.      Findings: No bruising or lesion.   Neurological:      General: No focal deficit present.      Mental Status: He is alert and oriented to person, place, and time.      Cranial Nerves: No cranial nerve deficit.      Sensory: No sensory deficit.      Motor: No weakness.      Gait: Gait normal.   Psychiatric:         Mood and Affect: Mood normal.         Behavior: Behavior normal.         Thought Content: Thought content normal.               There were no vitals filed for this visit.    There is no height or weight on file to calculate BSA.    Assessment/Plan:      ECOG 1    Glioma :    == Grade 2 glioma of the left frontal lobe status post subtotal resection in January of 2023.   == s/p Chemo-XRT completed recently and now on adjuvant Temodar tolerating well.  == I will add Bactrim DS for PCP prohylaxis at this time and see patient prior to C#2  == 7/10/23: Labs reviewed. Tolerating Bactrim DS well with no dose limiting toxicities. Voices no complaints. Next MRI brain in Aug 2023  ==08/07/2023: First cycle of adjuvant Temozolomide well tolerated, patient has some mild nausea relieved with phenergan prn. He is not taking ondansetron states phenergan works better.  Temozolomide dosage is 390mg days 1-5 q 28 days.  Reports good compliance with bactrim for pcp prophylaxis. MRI Brain reviewed Next MRI brain is scheduled November 6th 2023.  No headaches, visual changes. Good compliance with Keppra, will  check keppra level with labs.  ==09/05/2023: Patient is s/p 2 cycles of adjuvant Temozolomide, has intermittent nausea relieved with phenergan.  No s/s infection, is on bactrim for pcp prophylaxis.  Plan to continue adjuvant Temozolomide 6-12 cycles with MRI brain on 11/6/23 has set up per radiation oncology. Keppra level not drawn, will add to orders  ==10/09/2023: Patient doing well, recently saw neurosurgery with MRI brain which was noted to have continued stable residual disease.  Temozolomide well tolerated, reports occasional nausea relieved with ondansetron.  Will continue with Temozolomide, patient is cleared to start cycle 4 with 6-12 planned adjuvant cycles. Next MRI brain scheduled by neurosurgery in 4 months  ==11/6/2023: Patient due to start cycle 5 adjuvant Temozolomide.      Plan:  RTC 4 weeks with Dr. Graves  Continue Bactrim DS for PCP prophylaxis  MRI brain with and without contrast - scheduled per neurosurgery               Total time spent in counseling and discussion about further management options including relevant lab work, treatment,  prognosis, medications and intended side effects was more than 25 minutes. More than 50% of the time was spent on counseling and coordination of care.  I spent a total of 25 minutes on the day of the visit.This includes face to face time and non-face to face time preparing to see the patient (eg, review of tests), Obtaining and/or reviewing separately obtained history, Documenting clinical information in the electronic or other health record, Independently interpreting resultsand communicating results to the patient/family/caregiver, or Care coordination.

## 2023-11-07 ENCOUNTER — OFFICE VISIT (OUTPATIENT)
Dept: UROLOGY | Facility: CLINIC | Age: 48
End: 2023-11-07
Payer: COMMERCIAL

## 2023-11-07 VITALS — BODY MASS INDEX: 23.91 KG/M2 | WEIGHT: 167 LBS | HEIGHT: 70 IN

## 2023-11-07 DIAGNOSIS — R39.198 DIFFICULTY VOIDING: Primary | ICD-10-CM

## 2023-11-07 DIAGNOSIS — R39.15 URINARY URGENCY: ICD-10-CM

## 2023-11-07 DIAGNOSIS — R35.0 URINARY FREQUENCY: ICD-10-CM

## 2023-11-07 LAB
BILIRUBIN, UA POC OHS: NEGATIVE
BLOOD, UA POC OHS: NEGATIVE
CLARITY, UA POC OHS: CLEAR
COLOR, UA POC OHS: YELLOW
GLUCOSE, UA POC OHS: NEGATIVE
KETONES, UA POC OHS: NEGATIVE
LEUKOCYTES, UA POC OHS: NEGATIVE
NITRITE, UA POC OHS: NEGATIVE
PH, UA POC OHS: 7
PROTEIN, UA POC OHS: NEGATIVE
SPECIFIC GRAVITY, UA POC OHS: 1.01
UROBILINOGEN, UA POC OHS: 0.2

## 2023-11-07 PROCEDURE — 1160F PR REVIEW ALL MEDS BY PRESCRIBER/CLIN PHARMACIST DOCUMENTED: ICD-10-PCS | Mod: CPTII,S$GLB,, | Performed by: NURSE PRACTITIONER

## 2023-11-07 PROCEDURE — 99214 OFFICE O/P EST MOD 30 MIN: CPT | Mod: S$GLB,,, | Performed by: NURSE PRACTITIONER

## 2023-11-07 PROCEDURE — 3008F PR BODY MASS INDEX (BMI) DOCUMENTED: ICD-10-PCS | Mod: CPTII,S$GLB,, | Performed by: NURSE PRACTITIONER

## 2023-11-07 PROCEDURE — 99214 PR OFFICE/OUTPT VISIT, EST, LEVL IV, 30-39 MIN: ICD-10-PCS | Mod: S$GLB,,, | Performed by: NURSE PRACTITIONER

## 2023-11-07 PROCEDURE — 81003 POCT URINALYSIS(INSTRUMENT): ICD-10-PCS | Mod: QW,S$GLB,, | Performed by: NURSE PRACTITIONER

## 2023-11-07 PROCEDURE — 1159F PR MEDICATION LIST DOCUMENTED IN MEDICAL RECORD: ICD-10-PCS | Mod: CPTII,S$GLB,, | Performed by: NURSE PRACTITIONER

## 2023-11-07 PROCEDURE — 3008F BODY MASS INDEX DOCD: CPT | Mod: CPTII,S$GLB,, | Performed by: NURSE PRACTITIONER

## 2023-11-07 PROCEDURE — 1160F RVW MEDS BY RX/DR IN RCRD: CPT | Mod: CPTII,S$GLB,, | Performed by: NURSE PRACTITIONER

## 2023-11-07 PROCEDURE — 81003 URINALYSIS AUTO W/O SCOPE: CPT | Mod: QW,S$GLB,, | Performed by: NURSE PRACTITIONER

## 2023-11-07 PROCEDURE — 1159F MED LIST DOCD IN RCRD: CPT | Mod: CPTII,S$GLB,, | Performed by: NURSE PRACTITIONER

## 2023-11-07 RX ORDER — TAMSULOSIN HYDROCHLORIDE 0.4 MG/1
0.8 CAPSULE ORAL DAILY
Qty: 180 CAPSULE | Refills: 3 | Status: SHIPPED | OUTPATIENT
Start: 2023-11-07 | End: 2024-11-06

## 2023-11-20 ENCOUNTER — TELEPHONE (OUTPATIENT)
Dept: HEMATOLOGY/ONCOLOGY | Facility: CLINIC | Age: 48
End: 2023-11-20

## 2023-11-20 ENCOUNTER — OFFICE VISIT (OUTPATIENT)
Dept: HEMATOLOGY/ONCOLOGY | Facility: CLINIC | Age: 48
End: 2023-11-20
Payer: COMMERCIAL

## 2023-11-20 VITALS
HEART RATE: 60 BPM | RESPIRATION RATE: 18 BRPM | HEIGHT: 70 IN | SYSTOLIC BLOOD PRESSURE: 108 MMHG | DIASTOLIC BLOOD PRESSURE: 70 MMHG | BODY MASS INDEX: 24.34 KG/M2 | OXYGEN SATURATION: 98 % | WEIGHT: 170 LBS

## 2023-11-20 DIAGNOSIS — C71.9 GLIOMA: Primary | ICD-10-CM

## 2023-11-20 PROCEDURE — 99214 OFFICE O/P EST MOD 30 MIN: CPT | Mod: S$GLB,,, | Performed by: NURSE PRACTITIONER

## 2023-11-20 PROCEDURE — 3008F PR BODY MASS INDEX (BMI) DOCUMENTED: ICD-10-PCS | Mod: CPTII,S$GLB,, | Performed by: NURSE PRACTITIONER

## 2023-11-20 PROCEDURE — 3008F BODY MASS INDEX DOCD: CPT | Mod: CPTII,S$GLB,, | Performed by: NURSE PRACTITIONER

## 2023-11-20 PROCEDURE — 3078F PR MOST RECENT DIASTOLIC BLOOD PRESSURE < 80 MM HG: ICD-10-PCS | Mod: CPTII,S$GLB,, | Performed by: NURSE PRACTITIONER

## 2023-11-20 PROCEDURE — 3074F PR MOST RECENT SYSTOLIC BLOOD PRESSURE < 130 MM HG: ICD-10-PCS | Mod: CPTII,S$GLB,, | Performed by: NURSE PRACTITIONER

## 2023-11-20 PROCEDURE — 1159F PR MEDICATION LIST DOCUMENTED IN MEDICAL RECORD: ICD-10-PCS | Mod: CPTII,S$GLB,, | Performed by: NURSE PRACTITIONER

## 2023-11-20 PROCEDURE — 3078F DIAST BP <80 MM HG: CPT | Mod: CPTII,S$GLB,, | Performed by: NURSE PRACTITIONER

## 2023-11-20 PROCEDURE — 99214 PR OFFICE/OUTPT VISIT, EST, LEVL IV, 30-39 MIN: ICD-10-PCS | Mod: S$GLB,,, | Performed by: NURSE PRACTITIONER

## 2023-11-20 PROCEDURE — 1159F MED LIST DOCD IN RCRD: CPT | Mod: CPTII,S$GLB,, | Performed by: NURSE PRACTITIONER

## 2023-11-20 PROCEDURE — 3074F SYST BP LT 130 MM HG: CPT | Mod: CPTII,S$GLB,, | Performed by: NURSE PRACTITIONER

## 2023-11-20 NOTE — PROGRESS NOTES
MEDICAL ONCOLOGY FOLLOW UP CONSULTATION NOTE    Patient ID: Marvin Pool is a 48 y.o. male.    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  He also completed chemo XRT and now is currently on adjuvant Temodar.  He would like to establish care here locally in [place] and voices no acute complaints reporting that he is tolerated Temodar without any side effects at this time.  He presents to our clinic today for further evaluation        Imaging:       MRI brain:  01/12/23    Expected immediate postsurgical changes of left frontal craniotomy for frontal mass resection.     Persistent area of T2 FLAIR hyperintense signal surrounding the resection is similar size compared to preoperative MRI. Typical thin peripheral enhancement around the resection cavity.        Past Medical History:   Diagnosis Date    Brain tumor 1/12/2023    Seizures 1/12/2023     Family History   Problem Relation Age of Onset    No Known Problems Mother     No Known Problems Father     No Known Problems Sister     No Known Problems Brother     No Known Problems Maternal Aunt      Social History     Socioeconomic History    Marital status:    Tobacco Use    Smoking status: Never    Smokeless tobacco: Never   Substance and Sexual Activity    Alcohol use: Not Currently     Social Determinants of Health     Financial Resource Strain: Low Risk  (11/16/2023)    Overall Financial Resource Strain (CARDIA)     Difficulty of Paying Living Expenses: Not very hard   Food Insecurity: No Food Insecurity (11/16/2023)    Hunger Vital Sign     Worried About Running Out of Food in the Last Year: Never true     Ran Out of Food in the Last Year: Never true   Transportation Needs: No Transportation Needs (11/16/2023)    PRAPARE - Transportation     Lack of Transportation (Medical): No      Lack of Transportation (Non-Medical): No   Physical Activity: Sufficiently Active (11/16/2023)    Exercise Vital Sign     Days of Exercise per Week: 6 days     Minutes of Exercise per Session: 120 min   Stress: Stress Concern Present (11/16/2023)    [employer] - Occupational Stress Questionnaire     Feeling of Stress : Rather much   Social Connections: Unknown (11/16/2023)    Social Connection and Isolation Panel [NHANES]     Frequency of Communication with Friends and Family: More than three times a week     Frequency of Social Gatherings with Friends and Family: Three times a week     Active Member of Clubs or Organizations: No     Attends Club or Organization Meetings: Never     Marital Status:    Housing Stability: High Risk (11/16/2023)    Housing Stability Vital Sign     Unable to Pay for Housing in the Last Year: Yes     Number of Places Lived in the Last Year: 1     Unstable Housing in the Last Year: No     Past Surgical History:   Procedure Laterality Date    CRANIOTOMY USING FRAMELESS STEREOTAXY Left 1/12/2023    Procedure: CRANIOTOMY, USING FRAMELESS STEREOTAXY;  Surgeon: Wellington Park MD;  Location: [address];  Service: Neurosurgery;  Laterality: Left;         Review of systems:  Review of Systems   Constitutional:  Negative for activity change, appetite change, chills, diaphoresis, fatigue and unexpected weight change.   HENT:  Negative for congestion, facial swelling, hearing loss, mouth sores, trouble swallowing and voice change.    Eyes:  Negative for photophobia, pain, discharge and itching.   Respiratory:  Negative for apnea, cough, choking, chest tightness and shortness of breath.    Cardiovascular:  Negative for chest pain, palpitations and leg swelling.   Gastrointestinal:  Negative for abdominal distention, abdominal pain, anal bleeding and blood in stool.   Endocrine: Negative for cold intolerance, heat intolerance, polydipsia and polyphagia.    Genitourinary:  Negative for difficulty urinating, dysuria, flank pain and hematuria.   Musculoskeletal:  Negative for arthralgias, back pain, joint swelling, myalgias, neck pain and neck stiffness.   Skin:  Negative for color change, pallor and wound.   Allergic/Immunologic: Negative for environmental allergies, food allergies and immunocompromised state.   Neurological:  Negative for dizziness, seizures, facial asymmetry, speech difficulty, light-headedness, numbness and headaches.   Hematological:  Negative for adenopathy. Does not bruise/bleed easily.   Psychiatric/Behavioral:  Negative for agitation, behavioral problems, confusion, decreased concentration and sleep disturbance.            Physical Exam  Vitals and nursing note reviewed.   Constitutional:       General: He is not in acute distress.     Appearance: Normal appearance. He is not ill-appearing.   HENT:      Head: Normocephalic and atraumatic.      Nose: No congestion or rhinorrhea.   Eyes:      General: No scleral icterus.     Extraocular Movements: Extraocular movements intact.      Pupils: Pupils are equal, round, and reactive to light.   Cardiovascular:      Rate and Rhythm: Normal rate and regular rhythm.      Pulses: Normal pulses.      Heart sounds: Normal heart sounds. No murmur heard.     No gallop.   Pulmonary:      Effort: Pulmonary effort is normal. No respiratory distress.      Breath sounds: Normal breath sounds. No stridor. No wheezing or rhonchi.   Abdominal:      General: Bowel sounds are normal. There is no distension.      Palpations: There is no mass.      Tenderness: There is no abdominal tenderness. There is no guarding.   Musculoskeletal:         General: No swelling, tenderness, deformity or signs of injury. Normal range of motion.      Cervical back: Normal range of motion and neck supple. No rigidity. No muscular tenderness.      Right lower leg: No edema.      Left lower leg: No edema.   Skin:     General: Skin is warm.       Coloration: Skin is not jaundiced or pale.      Findings: No bruising or lesion.   Neurological:      General: No focal deficit present.      Mental Status: He is alert and oriented to person, place, and time.      Cranial Nerves: No cranial nerve deficit.      Sensory: No sensory deficit.      Motor: No weakness.      Gait: Gait normal.   Psychiatric:         Mood and Affect: Mood normal.         Behavior: Behavior normal.         Thought Content: Thought content normal.               Vitals:    11/20/23 0921   BP: 108/70   Pulse: 60   Resp: 18       Body surface area is 1.95 meters squared.    Assessment/Plan:      ECOG 1    Glioma :    == Grade 2 glioma of the left frontal lobe status post subtotal resection in January of 2023.   == s/p Chemo-XRT completed recently and now on adjuvant Temodar tolerating well.  == I will add Bactrim DS for PCP prohylaxis at this time and see patient prior to C#2  == 7/10/23: Labs reviewed. Tolerating Bactrim DS well with no dose limiting toxicities. Voices no complaints. Next MRI brain in Aug 2023  ==08/07/2023: First cycle of adjuvant Temozolomide well tolerated, patient has some mild nausea relieved with phenergan prn. He is not taking ondansetron states phenergan works better.  Temozolomide dosage is 390mg days 1-5 q 28 days.  Reports good compliance with bactrim for pcp prophylaxis. MRI Brain reviewed Next MRI brain is scheduled November 6th 2023.  No headaches, visual changes. Good compliance with Keppra, will check keppra level with labs.  ==09/05/2023: Patient is s/p 2 cycles of adjuvant Temozolomide, has intermittent nausea relieved with phenergan.  No s/s infection, is on bactrim for pcp prophylaxis.  Plan to continue adjuvant Temozolomide 6-12 cycles with MRI brain on 11/6/23 has set up per radiation oncology. Keppra level not drawn, will add to orders  ==10/09/2023: Patient doing well, recently saw neurosurgery with MRI brain which was noted to have continued  stable residual disease.  Temozolomide well tolerated, reports occasional nausea relieved with ondansetron.  Will continue with Temozolomide, patient is cleared to start cycle 4 with 6-12 planned adjuvant cycles. Next MRI brain scheduled by neurosurgery in 4 months  ==11/6/2023: Patient due to start cycle 5 adjuvant Temozolomide.  Tolerating well.  ==11/20/2023: MRI brain not scheduled, discussed with patient and I will order to be done before end of year.  He has been seeing urology for bladder issues with incomplete emptying and poor stream for 3 months and was placed on Flomax.  No loss of bladder.  Denies complaints, no headaches or visual changes.        Plan:  RTC 4 weeks with Dr. Graves - cbc cmp and mri brain prior  Continue Bactrim DS for PCP prophylaxis  MRI brain with and without contrast - will order, to be done before end of year               Total time spent in counseling and discussion about further management options including relevant lab work, treatment,  prognosis, medications and intended side effects was more than 25 minutes. More than 50% of the time was spent on counseling and coordination of care.  I spent a total of 25 minutes on the day of the visit.This includes face to face time and non-face to face time preparing to see the patient (eg, review of tests), Obtaining and/or reviewing separately obtained history, Documenting clinical information in the electronic or other health record, Independently interpreting resultsand communicating results to the patient/family/caregiver, or Care coordination.

## 2023-12-04 ENCOUNTER — TELEPHONE (OUTPATIENT)
Dept: NEUROSURGERY | Facility: CLINIC | Age: 48
End: 2023-12-04
Payer: COMMERCIAL

## 2023-12-04 ENCOUNTER — PATIENT MESSAGE (OUTPATIENT)
Dept: NEUROSURGERY | Facility: CLINIC | Age: 48
End: 2023-12-04
Payer: COMMERCIAL

## 2023-12-04 DIAGNOSIS — D49.6 BRAIN TUMOR: Primary | ICD-10-CM

## 2023-12-04 DIAGNOSIS — C71.9 GLIOMA: ICD-10-CM

## 2023-12-11 ENCOUNTER — PATIENT MESSAGE (OUTPATIENT)
Dept: NEUROSURGERY | Facility: CLINIC | Age: 48
End: 2023-12-11
Payer: COMMERCIAL

## 2023-12-11 DIAGNOSIS — C71.9 GLIOMA: ICD-10-CM

## 2023-12-11 RX ORDER — TEMOZOLOMIDE 250 MG/1
CAPSULE ORAL
Qty: 5 CAPSULE | Refills: 3 | Status: SHIPPED | OUTPATIENT
Start: 2023-12-11 | End: 2024-01-17 | Stop reason: SDUPTHER

## 2023-12-13 LAB
ALBUMIN SERPL BCP-MCNC: 4.1 G/DL (ref 3.4–5)
ALBUMIN/GLOBULIN RATIO: 1.21 RATIO (ref 1.1–1.8)
ALP SERPL-CCNC: 66 U/L (ref 46–116)
ALT SERPL W P-5'-P-CCNC: 35 U/L (ref 12–78)
ANION GAP SERPL CALC-SCNC: 8 MMOL/L (ref 3–11)
AST SERPL-CCNC: 16 U/L (ref 15–37)
BASOPHILS NFR BLD: 0.9 % (ref 0–3)
BILIRUB SERPL-MCNC: 0.4 MG/DL (ref 0–1)
BUN SERPL-MCNC: 10 MG/DL (ref 7–18)
BUN/CREAT SERPL: 8.69 RATIO (ref 7–18)
CALCIUM SERPL-MCNC: 9.1 MG/DL (ref 8.8–10.5)
CHLORIDE SERPL-SCNC: 100 MMOL/L (ref 100–108)
CO2 SERPL-SCNC: 27 MMOL/L (ref 21–32)
CREAT SERPL-MCNC: 1.15 MG/DL (ref 0.7–1.3)
EOSINOPHIL NFR BLD: 3.2 % (ref 1–3)
ERYTHROCYTE [DISTWIDTH] IN BLOOD BY AUTOMATED COUNT: 13.2 % (ref 12.5–18)
GFR ESTIMATION: > 60
GLOBULIN: 3.4 G/DL (ref 2.3–3.5)
GLUCOSE SERPL-MCNC: 109 MG/DL (ref 70–110)
HCT VFR BLD AUTO: 40.6 % (ref 42–52)
HGB BLD-MCNC: 13.8 G/DL (ref 14–18)
LYMPHOCYTES NFR BLD: 17.5 % (ref 25–40)
MCH RBC QN AUTO: 28.5 PG (ref 27–31.2)
MCHC RBC AUTO-ENTMCNC: 34 G/DL (ref 31.8–35.4)
MCV RBC AUTO: 83.9 FL (ref 80–97)
MONOCYTES NFR BLD: 14.4 % (ref 1–15)
NEUTROPHILS # BLD AUTO: 2.79 10*3/UL (ref 1.8–7.7)
NEUTROPHILS NFR BLD: 63.5 % (ref 37–80)
NUCLEATED RED BLOOD CELLS: 0 %
PLATELETS: 384 10*3/UL (ref 142–424)
POTASSIUM SERPL-SCNC: 4 MMOL/L (ref 3.6–5.2)
PROT SERPL-MCNC: 7.5 G/DL (ref 6.4–8.2)
RBC # BLD AUTO: 4.84 10*6/UL (ref 4.7–6.1)
SODIUM BLD-SCNC: 135 MMOL/L (ref 135–145)
WBC # BLD: 4.4 10*3/UL (ref 4.6–10.2)

## 2023-12-20 ENCOUNTER — PATIENT MESSAGE (OUTPATIENT)
Dept: NEUROSURGERY | Facility: CLINIC | Age: 48
End: 2023-12-20
Payer: COMMERCIAL

## 2023-12-20 ENCOUNTER — OFFICE VISIT (OUTPATIENT)
Dept: HEMATOLOGY/ONCOLOGY | Facility: CLINIC | Age: 48
End: 2023-12-20
Payer: COMMERCIAL

## 2023-12-20 VITALS
HEART RATE: 62 BPM | SYSTOLIC BLOOD PRESSURE: 101 MMHG | HEIGHT: 70 IN | RESPIRATION RATE: 18 BRPM | DIASTOLIC BLOOD PRESSURE: 67 MMHG | BODY MASS INDEX: 24.2 KG/M2 | OXYGEN SATURATION: 97 % | WEIGHT: 169 LBS

## 2023-12-20 DIAGNOSIS — C71.9 GLIOMA: Primary | ICD-10-CM

## 2023-12-20 PROCEDURE — 3008F PR BODY MASS INDEX (BMI) DOCUMENTED: ICD-10-PCS | Mod: CPTII,S$GLB,, | Performed by: INTERNAL MEDICINE

## 2023-12-20 PROCEDURE — 1159F PR MEDICATION LIST DOCUMENTED IN MEDICAL RECORD: ICD-10-PCS | Mod: CPTII,S$GLB,, | Performed by: INTERNAL MEDICINE

## 2023-12-20 PROCEDURE — 3078F DIAST BP <80 MM HG: CPT | Mod: CPTII,S$GLB,, | Performed by: INTERNAL MEDICINE

## 2023-12-20 PROCEDURE — 1159F MED LIST DOCD IN RCRD: CPT | Mod: CPTII,S$GLB,, | Performed by: INTERNAL MEDICINE

## 2023-12-20 PROCEDURE — 3074F SYST BP LT 130 MM HG: CPT | Mod: CPTII,S$GLB,, | Performed by: INTERNAL MEDICINE

## 2023-12-20 PROCEDURE — 3078F PR MOST RECENT DIASTOLIC BLOOD PRESSURE < 80 MM HG: ICD-10-PCS | Mod: CPTII,S$GLB,, | Performed by: INTERNAL MEDICINE

## 2023-12-20 PROCEDURE — 3074F PR MOST RECENT SYSTOLIC BLOOD PRESSURE < 130 MM HG: ICD-10-PCS | Mod: CPTII,S$GLB,, | Performed by: INTERNAL MEDICINE

## 2023-12-20 PROCEDURE — 99215 PR OFFICE/OUTPT VISIT, EST, LEVL V, 40-54 MIN: ICD-10-PCS | Mod: S$GLB,,, | Performed by: INTERNAL MEDICINE

## 2023-12-20 PROCEDURE — 99215 OFFICE O/P EST HI 40 MIN: CPT | Mod: S$GLB,,, | Performed by: INTERNAL MEDICINE

## 2023-12-20 PROCEDURE — 3008F BODY MASS INDEX DOCD: CPT | Mod: CPTII,S$GLB,, | Performed by: INTERNAL MEDICINE

## 2023-12-20 NOTE — PROGRESS NOTES
MEDICAL ONCOLOGY FOLLOW UP CONSULTATION NOTE    Patient ID: Marvin Pool is a 48 y.o. male.    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  He also completed chemo XRT and now is currently on adjuvant Temodar.  He would like to establish care here locally in [place] and voices no acute complaints reporting that he is tolerated Temodar without any side effects at this time.  He presents to our clinic today for further evaluation        Imaging:       MRI brain:  01/12/23    Expected immediate postsurgical changes of left frontal craniotomy for frontal mass resection.     Persistent area of T2 FLAIR hyperintense signal surrounding the resection is similar size compared to preoperative MRI. Typical thin peripheral enhancement around the resection cavity.        Past Medical History:   Diagnosis Date    Brain tumor 1/12/2023    Seizures 1/12/2023     Family History   Problem Relation Age of Onset    No Known Problems Mother     No Known Problems Father     No Known Problems Sister     No Known Problems Brother     No Known Problems Maternal Aunt      Social History     Socioeconomic History    Marital status:    Tobacco Use    Smoking status: Never    Smokeless tobacco: Never   Substance and Sexual Activity    Alcohol use: Not Currently     Social Determinants of Health     Financial Resource Strain: Low Risk  (11/16/2023)    Overall Financial Resource Strain (CARDIA)     Difficulty of Paying Living Expenses: Not very hard   Food Insecurity: No Food Insecurity (11/16/2023)    Hunger Vital Sign     Worried About Running Out of Food in the Last Year: Never true     Ran Out of Food in the Last Year: Never true   Transportation Needs: No Transportation Needs (11/16/2023)    PRAPARE - Transportation     Lack of Transportation (Medical): No      Lack of Transportation (Non-Medical): No   Physical Activity: Sufficiently Active (11/16/2023)    Exercise Vital Sign     Days of Exercise per Week: 6 days     Minutes of Exercise per Session: 120 min   Stress: Stress Concern Present (11/16/2023)    [employer] - Occupational Stress Questionnaire     Feeling of Stress : Rather much   Social Connections: Unknown (11/16/2023)    Social Connection and Isolation Panel [NHANES]     Frequency of Communication with Friends and Family: More than three times a week     Frequency of Social Gatherings with Friends and Family: Three times a week     Active Member of Clubs or Organizations: No     Attends Club or Organization Meetings: Never     Marital Status:    Housing Stability: High Risk (11/16/2023)    Housing Stability Vital Sign     Unable to Pay for Housing in the Last Year: Yes     Number of Places Lived in the Last Year: 1     Unstable Housing in the Last Year: No     Past Surgical History:   Procedure Laterality Date    CRANIOTOMY USING FRAMELESS STEREOTAXY Left 1/12/2023    Procedure: CRANIOTOMY, USING FRAMELESS STEREOTAXY;  Surgeon: Wellington Park MD;  Location: [address];  Service: Neurosurgery;  Laterality: Left;         Review of systems:  Review of Systems   Constitutional:  Negative for activity change, appetite change, chills, diaphoresis, fatigue and unexpected weight change.   HENT:  Negative for congestion, facial swelling, hearing loss, mouth sores, trouble swallowing and voice change.    Eyes:  Negative for photophobia, pain, discharge and itching.   Respiratory:  Negative for apnea, cough, choking, chest tightness and shortness of breath.    Cardiovascular:  Negative for chest pain, palpitations and leg swelling.   Gastrointestinal:  Negative for abdominal distention, abdominal pain, anal bleeding and blood in stool.   Endocrine: Negative for cold intolerance, heat intolerance, polydipsia and polyphagia.    Genitourinary:  Negative for difficulty urinating, dysuria, flank pain and hematuria.   Musculoskeletal:  Negative for arthralgias, back pain, joint swelling, myalgias, neck pain and neck stiffness.   Skin:  Negative for color change, pallor and wound.   Allergic/Immunologic: Negative for environmental allergies, food allergies and immunocompromised state.   Neurological:  Negative for dizziness, seizures, facial asymmetry, speech difficulty, light-headedness, numbness and headaches.   Hematological:  Negative for adenopathy. Does not bruise/bleed easily.   Psychiatric/Behavioral:  Negative for agitation, behavioral problems, confusion, decreased concentration and sleep disturbance.            Physical Exam  Vitals and nursing note reviewed.   Constitutional:       General: He is not in acute distress.     Appearance: Normal appearance. He is not ill-appearing.   HENT:      Head: Normocephalic and atraumatic.      Nose: No congestion or rhinorrhea.   Eyes:      General: No scleral icterus.     Extraocular Movements: Extraocular movements intact.      Pupils: Pupils are equal, round, and reactive to light.   Cardiovascular:      Rate and Rhythm: Normal rate and regular rhythm.      Pulses: Normal pulses.      Heart sounds: Normal heart sounds. No murmur heard.     No gallop.   Pulmonary:      Effort: Pulmonary effort is normal. No respiratory distress.      Breath sounds: Normal breath sounds. No stridor. No wheezing or rhonchi.   Abdominal:      General: Bowel sounds are normal. There is no distension.      Palpations: There is no mass.      Tenderness: There is no abdominal tenderness. There is no guarding.   Musculoskeletal:         General: No swelling, tenderness, deformity or signs of injury. Normal range of motion.      Cervical back: Normal range of motion and neck supple. No rigidity. No muscular tenderness.      Right lower leg: No edema.      Left lower leg: No edema.   Skin:     General: Skin is warm.       Coloration: Skin is not jaundiced or pale.      Findings: No bruising or lesion.   Neurological:      General: No focal deficit present.      Mental Status: He is alert and oriented to person, place, and time.      Cranial Nerves: No cranial nerve deficit.      Sensory: No sensory deficit.      Motor: No weakness.      Gait: Gait normal.   Psychiatric:         Mood and Affect: Mood normal.         Behavior: Behavior normal.         Thought Content: Thought content normal.                 Vitals:    12/20/23 0900   BP: 101/67   Pulse: 62   Resp: 18       Body surface area is 1.95 meters squared.    Assessment/Plan:      ECOG 1    Glioma :    == Grade 2 glioma of the left frontal lobe status post subtotal resection in January of 2023.   == s/p Chemo-XRT completed recently and now on adjuvant Temodar tolerating well.  == I will add Bactrim DS for PCP prohylaxis at this time and see patient prior to C#2  == 7/10/23: Labs reviewed. Tolerating Bactrim DS well with no dose limiting toxicities. Voices no complaints. Next MRI brain in Aug 2023  ==8/07/23: First cycle of adjuvant Temozolomide well tolerated, patient has some mild nausea relieved with phenergan prn. He is not taking ondansetron states phenergan works better.  Temozolomide dosage is 390mg days 1-5 q 28 days.  Reports good compliance with bactrim for pcp prophylaxis. MRI Brain reviewed Next MRI brain is scheduled November 6th 2023.  No headaches, visual changes. Good compliance with Keppra, will check keppra level with labs.  ==09/05/23: Patient is s/p 2 cycles of adjuvant Temozolomide, has intermittent nausea relieved with phenergan.  No s/s infection, is on bactrim for pcp prophylaxis.  Plan to continue adjuvant Temozolomide 6-12 cycles with MRI brain on 11/6/23 has set up per radiation oncology. Keppra level not drawn, will add to orders  ==10/09/23: Patient doing well, recently saw neurosurgery with MRI brain which was noted to have continued stable  residual disease.  Temozolomide well tolerated, reports occasional nausea relieved with ondansetron.  Will continue with Temozolomide, patient is cleared to start cycle 4 with 6-12 planned adjuvant cycles. Next MRI brain scheduled by neurosurgery in 4 months  ==11/6/23: Patient due to start cycle 5 adjuvant Temozolomide.  Tolerating well.  ==12/20/23: MRI brain 12/4/23 shows stable disease with post radiation changes. Stable T2 hyperintense signal at the operative cavity which may be a combination of tumor and or gliosis.      Plan:  Continue Temodar for 6-12 planned adjuvant cycles  Continue Bactrim DS for PCP prophylaxis      RTC 4 weeks for MD visit with labs: CBC, CMP prior      Total time spent in counseling and discussion about further management options including relevant lab work, treatment,  prognosis, medications and intended side effects was more than 25 minutes. More than 50% of the time was spent on counseling and coordination of care.  I spent a total of 25 minutes on the day of the visit.This includes face to face time and non-face to face time preparing to see the patient (eg, review of tests), Obtaining and/or reviewing separately obtained history, Documenting clinical information in the electronic or other health record, Independently interpreting resultsand communicating results to the patient/family/caregiver, or Care coordination.

## 2023-12-21 ENCOUNTER — TELEPHONE (OUTPATIENT)
Dept: NEUROSURGERY | Facility: CLINIC | Age: 48
End: 2023-12-21
Payer: COMMERCIAL

## 2024-01-09 DIAGNOSIS — C71.1 MALIGNANT NEOPLASM OF FRONTAL LOBE: Chronic | ICD-10-CM

## 2024-01-09 RX ORDER — SULFAMETHOXAZOLE AND TRIMETHOPRIM 800; 160 MG/1; MG/1
1 TABLET ORAL DAILY
Qty: 30 TABLET | Refills: 0 | Status: SHIPPED | OUTPATIENT
Start: 2024-01-09 | End: 2024-02-08

## 2024-01-12 DIAGNOSIS — R11.2 CHEMOTHERAPY-INDUCED NAUSEA AND VOMITING: Primary | ICD-10-CM

## 2024-01-12 DIAGNOSIS — T45.1X5A CHEMOTHERAPY-INDUCED NAUSEA AND VOMITING: Primary | ICD-10-CM

## 2024-01-16 ENCOUNTER — OFFICE VISIT (OUTPATIENT)
Dept: NEUROSURGERY | Facility: CLINIC | Age: 49
End: 2024-01-16
Payer: COMMERCIAL

## 2024-01-16 DIAGNOSIS — C71.9 OLIGODENDROGLIOMA: Primary | ICD-10-CM

## 2024-01-16 DIAGNOSIS — D49.6 BRAIN TUMOR: ICD-10-CM

## 2024-01-16 PROCEDURE — 1159F MED LIST DOCD IN RCRD: CPT | Mod: CPTII,95,, | Performed by: NEUROLOGICAL SURGERY

## 2024-01-16 PROCEDURE — 1160F RVW MEDS BY RX/DR IN RCRD: CPT | Mod: CPTII,95,, | Performed by: NEUROLOGICAL SURGERY

## 2024-01-16 PROCEDURE — 99214 OFFICE O/P EST MOD 30 MIN: CPT | Mod: 95,,, | Performed by: NEUROLOGICAL SURGERY

## 2024-01-16 NOTE — PATIENT INSTRUCTIONS
I have personally reviewed the MRI brain with the pt which shows:  1.  Slightly nodular enhancement along the anterosuperior margin of the cavity is slightly more pronounced compared to 9/11/2023. This could be posttreatment change particularly if the patient has had radiation. Continued attention on follow-up is recommended.    2.  Stable T2 hyperintense signal at the operative cavity which may be a combination of tumor and/or gliosis.     I will schedule the patient for MRI perfusion and follow up thereafter.

## 2024-01-16 NOTE — PROGRESS NOTES
The patient location is: [place]  The chief complaint leading to consultation is: 4 month follow up    Visit type: audiovisual    Face to Face time with patient: 10 minutes  25 minutes of total time spent on the encounter, which includes face to face time and non-face to face time preparing to see the patient (eg, review of tests), Obtaining and/or reviewing separately obtained history, Documenting clinical information in the electronic or other health record, Independently interpreting results (not separately reported) and communicating results to the patient/family/caregiver, or Care coordination (not separately reported).         Each patient to whom he or she provides medical services by telemedicine is:  (1) informed of the relationship between the physician and patient and the respective role of any other health care provider with respect to management of the patient; and (2) notified that he or she may decline to receive medical services by telemedicine and may withdraw from such care at any time.    Notes:    Subjective:   IBonnie, attest that this documentation has been prepared under the direction and in the presence of Wellington Park MD.     Patient ID: Marvin Pool is a 48 y.o. male     Chief Complaint: No chief complaint on file.      HPI  Mr. Marvin Pool is a 48 y.o. 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 At the time of our last visit on 9/12/2023, the pt reports he is doing well overall. Good performance status.    Today the pt reports an acute speech deficit. Endorses intermittent word finding difficulty.      Review of Systems   Constitutional:  Negative for activity change, appetite change, fatigue, fever and unexpected weight change.   HENT:  Negative for facial swelling.    Eyes: Negative.    Respiratory: Negative.     Cardiovascular:  Negative.    Gastrointestinal:  Negative for diarrhea, nausea and vomiting.   Endocrine: Negative.    Genitourinary: Negative.    Musculoskeletal:  Negative for back pain, joint swelling, myalgias and neck pain.   Neurological:  Positive for speech difficulty. Negative for dizziness, seizures, weakness, numbness and headaches.   Psychiatric/Behavioral: Negative.          Past Medical History:   Diagnosis Date    Brain tumor 1/12/2023    Seizures 1/12/2023       Objective:    There were no vitals filed for this visit.   Physical Exam  Constitutional:       General: He is not in acute distress.     Appearance: Normal appearance.   HENT:      Head: Normocephalic and atraumatic.   Neurological:      Mental Status: He is alert and oriented to person, place, and time.          IMAGING:  MRI Brain W WO Contrast (12/4/2023):  1.  Slightly nodular enhancement along the anterosuperior margin of the cavity is slightly more pronounced compared to 9/11/2023. This could be posttreatment change particularly if the patient has had radiation. Continued attention on follow-up is recommended.    2.  Stable T2 hyperintense signal at the operative cavity which may be a combination of tumor and/or gliosis.       I have personally reviewed the images with the pt.      I, Dr. Wellington Park, personally performed the services described in this documentation. All medical record entries made by the scribe, oBnnie Ly, were at my direction and in my presence.  I have reviewed the chart and agree that the record reflects my personal performance and is accurate and complete. Wellington Park MD. 01/16/2024    Assessment:       Low grad glioma.     Plan:   I have personally reviewed the MRI brain with the pt which shows:  1.  Slightly nodular enhancement along the anterosuperior margin of the cavity is slightly more pronounced compared to 9/11/2023. This could be posttreatment change particularly if the patient has had radiation. Continued attention  on follow-up is recommended.    2.  Stable T2 hyperintense signal at the operative cavity which may be a combination of tumor and/or gliosis.     I will schedule the patient for MRI perfusion and follow up thereafter.

## 2024-01-17 ENCOUNTER — OFFICE VISIT (OUTPATIENT)
Dept: HEMATOLOGY/ONCOLOGY | Facility: CLINIC | Age: 49
End: 2024-01-17
Payer: COMMERCIAL

## 2024-01-17 VITALS
SYSTOLIC BLOOD PRESSURE: 120 MMHG | DIASTOLIC BLOOD PRESSURE: 77 MMHG | HEIGHT: 70 IN | BODY MASS INDEX: 24.51 KG/M2 | HEART RATE: 60 BPM | WEIGHT: 171.19 LBS | RESPIRATION RATE: 18 BRPM | OXYGEN SATURATION: 96 %

## 2024-01-17 DIAGNOSIS — R51.9 NEW ONSET OF HEADACHE IN CANCER PATIENT: Primary | ICD-10-CM

## 2024-01-17 DIAGNOSIS — C71.9 GLIOMA: ICD-10-CM

## 2024-01-17 DIAGNOSIS — R47.01 EXPRESSIVE APHASIA: ICD-10-CM

## 2024-01-17 LAB
ALBUMIN SERPL BCP-MCNC: 3.8 G/DL (ref 3.4–5)
ALBUMIN/GLOBULIN RATIO: 1.03 RATIO (ref 1.1–1.8)
ALP SERPL-CCNC: 75 U/L (ref 46–116)
ALT SERPL W P-5'-P-CCNC: 37 U/L (ref 12–78)
ANION GAP SERPL CALC-SCNC: 9 MMOL/L (ref 3–11)
AST SERPL-CCNC: 15 U/L (ref 15–37)
BASOPHILS NFR BLD: 0.6 % (ref 0–3)
BILIRUB SERPL-MCNC: 0.4 MG/DL (ref 0–1)
BUN SERPL-MCNC: 17 MG/DL (ref 7–18)
BUN/CREAT SERPL: 16.34 RATIO (ref 7–18)
CALCIUM SERPL-MCNC: 9.7 MG/DL (ref 8.8–10.5)
CHLORIDE SERPL-SCNC: 103 MMOL/L (ref 100–108)
CO2 SERPL-SCNC: 28 MMOL/L (ref 21–32)
CREAT SERPL-MCNC: 1.04 MG/DL (ref 0.7–1.3)
EOSINOPHIL NFR BLD: 4.6 % (ref 1–3)
ERYTHROCYTE [DISTWIDTH] IN BLOOD BY AUTOMATED COUNT: 13.2 % (ref 12.5–18)
GFR ESTIMATION: > 60
GLOBULIN: 3.7 G/DL (ref 2.3–3.5)
GLUCOSE SERPL-MCNC: 103 MG/DL (ref 70–110)
HCT VFR BLD AUTO: 39.3 % (ref 42–52)
HGB BLD-MCNC: 13.5 G/DL (ref 14–18)
LYMPHOCYTES NFR BLD: 10.2 % (ref 25–40)
MCH RBC QN AUTO: 28.4 PG (ref 27–31.2)
MCHC RBC AUTO-ENTMCNC: 34.4 G/DL (ref 31.8–35.4)
MCV RBC AUTO: 82.7 FL (ref 80–97)
MONOCYTES NFR BLD: 12.8 % (ref 1–15)
NEUTROPHILS # BLD AUTO: 3.85 10*3/UL (ref 1.8–7.7)
NEUTROPHILS NFR BLD: 71.2 % (ref 37–80)
NUCLEATED RED BLOOD CELLS: 0 %
PLATELETS: 379 10*3/UL (ref 142–424)
POTASSIUM SERPL-SCNC: 4.7 MMOL/L (ref 3.6–5.2)
PROT SERPL-MCNC: 7.5 G/DL (ref 6.4–8.2)
RBC # BLD AUTO: 4.75 10*6/UL (ref 4.7–6.1)
SODIUM BLD-SCNC: 140 MMOL/L (ref 135–145)
WBC # BLD: 5.4 10*3/UL (ref 4.6–10.2)

## 2024-01-17 PROCEDURE — 3074F SYST BP LT 130 MM HG: CPT | Mod: CPTII,S$GLB,, | Performed by: NURSE PRACTITIONER

## 2024-01-17 PROCEDURE — 1159F MED LIST DOCD IN RCRD: CPT | Mod: CPTII,S$GLB,, | Performed by: NURSE PRACTITIONER

## 2024-01-17 PROCEDURE — 3008F BODY MASS INDEX DOCD: CPT | Mod: CPTII,S$GLB,, | Performed by: NURSE PRACTITIONER

## 2024-01-17 PROCEDURE — 3078F DIAST BP <80 MM HG: CPT | Mod: CPTII,S$GLB,, | Performed by: NURSE PRACTITIONER

## 2024-01-17 PROCEDURE — 99214 OFFICE O/P EST MOD 30 MIN: CPT | Mod: S$GLB,,, | Performed by: NURSE PRACTITIONER

## 2024-01-17 RX ORDER — TEMOZOLOMIDE 140 MG/1
CAPSULE ORAL
Qty: 5 CAPSULE | Refills: 6 | Status: SHIPPED | OUTPATIENT
Start: 2024-01-17 | End: 2024-02-20 | Stop reason: SDUPTHER

## 2024-01-17 RX ORDER — TEMOZOLOMIDE 250 MG/1
CAPSULE ORAL
Qty: 5 CAPSULE | Refills: 6 | Status: SHIPPED | OUTPATIENT
Start: 2024-01-17 | End: 2024-02-20 | Stop reason: SDUPTHER

## 2024-01-17 NOTE — PROGRESS NOTES
MEDICAL ONCOLOGY FOLLOW UP CONSULTATION NOTE    Patient ID: Marvin Pool is a 48 y.o. male.    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  He also completed chemo XRT and now is currently on adjuvant Temodar.  He would like to establish care here locally in [place] and voices no acute complaints reporting that he is tolerated Temodar without any side effects at this time.  He presents to our clinic today for further evaluation        Imaging:       MRI brain:  01/12/23    Expected immediate postsurgical changes of left frontal craniotomy for frontal mass resection.     Persistent area of T2 FLAIR hyperintense signal surrounding the resection is similar size compared to preoperative MRI. Typical thin peripheral enhancement around the resection cavity.        Past Medical History:   Diagnosis Date    Brain tumor 1/12/2023    Seizures 1/12/2023     Family History   Problem Relation Age of Onset    No Known Problems Mother     No Known Problems Father     No Known Problems Sister     No Known Problems Brother     No Known Problems Maternal Aunt      Social History     Socioeconomic History    Marital status:    Tobacco Use    Smoking status: Never    Smokeless tobacco: Never   Substance and Sexual Activity    Alcohol use: Not Currently     Social Determinants of Health     Financial Resource Strain: Low Risk  (11/16/2023)    Overall Financial Resource Strain (CARDIA)     Difficulty of Paying Living Expenses: Not very hard   Food Insecurity: No Food Insecurity (11/16/2023)    Hunger Vital Sign     Worried About Running Out of Food in the Last Year: Never true     Ran Out of Food in the Last Year: Never true   Transportation Needs: No Transportation Needs (11/16/2023)    PRAPARE - Transportation     Lack of Transportation (Medical): No      Lack of Transportation (Non-Medical): No   Physical Activity: Sufficiently Active (11/16/2023)    Exercise Vital Sign     Days of Exercise per Week: 6 days     Minutes of Exercise per Session: 120 min   Stress: Stress Concern Present (11/16/2023)    [employer] - Occupational Stress Questionnaire     Feeling of Stress : Rather much   Social Connections: Unknown (11/16/2023)    Social Connection and Isolation Panel [NHANES]     Frequency of Communication with Friends and Family: More than three times a week     Frequency of Social Gatherings with Friends and Family: Three times a week     Active Member of Clubs or Organizations: No     Attends Club or Organization Meetings: Never     Marital Status:    Housing Stability: High Risk (11/16/2023)    Housing Stability Vital Sign     Unable to Pay for Housing in the Last Year: Yes     Number of Places Lived in the Last Year: 1     Unstable Housing in the Last Year: No     Past Surgical History:   Procedure Laterality Date    CRANIOTOMY USING FRAMELESS STEREOTAXY Left 1/12/2023    Procedure: CRANIOTOMY, USING FRAMELESS STEREOTAXY;  Surgeon: Wellington Park MD;  Location: [address];  Service: Neurosurgery;  Laterality: Left;         Review of systems:  Review of Systems   Constitutional:  Negative for activity change, appetite change, chills, diaphoresis, fatigue and unexpected weight change.   HENT:  Negative for congestion, facial swelling, hearing loss, mouth sores, trouble swallowing and voice change.    Eyes:  Negative for photophobia, pain, discharge and itching.   Respiratory:  Negative for apnea, cough, choking, chest tightness and shortness of breath.    Cardiovascular:  Negative for chest pain, palpitations and leg swelling.   Gastrointestinal:  Negative for abdominal distention, abdominal pain, anal bleeding and blood in stool.   Endocrine: Negative for cold intolerance, heat intolerance, polydipsia and polyphagia.    Genitourinary:  Negative for difficulty urinating, dysuria, flank pain and hematuria.   Musculoskeletal:  Negative for arthralgias, back pain, joint swelling, myalgias, neck pain and neck stiffness.   Skin:  Negative for color change, pallor and wound.   Allergic/Immunologic: Negative for environmental allergies, food allergies and immunocompromised state.   Neurological:  Negative for dizziness, seizures, facial asymmetry, speech difficulty, light-headedness, numbness and headaches.   Hematological:  Negative for adenopathy. Does not bruise/bleed easily.   Psychiatric/Behavioral:  Negative for agitation, behavioral problems, confusion, decreased concentration and sleep disturbance.            Physical Exam  Vitals and nursing note reviewed.   Constitutional:       General: He is not in acute distress.     Appearance: Normal appearance. He is not ill-appearing.   HENT:      Head: Normocephalic and atraumatic.      Nose: No congestion or rhinorrhea.   Eyes:      General: No scleral icterus.     Extraocular Movements: Extraocular movements intact.      Pupils: Pupils are equal, round, and reactive to light.   Cardiovascular:      Rate and Rhythm: Normal rate and regular rhythm.      Pulses: Normal pulses.      Heart sounds: Normal heart sounds. No murmur heard.     No gallop.   Pulmonary:      Effort: Pulmonary effort is normal. No respiratory distress.      Breath sounds: Normal breath sounds. No stridor. No wheezing or rhonchi.   Abdominal:      General: Bowel sounds are normal. There is no distension.      Palpations: There is no mass.      Tenderness: There is no abdominal tenderness. There is no guarding.   Musculoskeletal:         General: No swelling, tenderness, deformity or signs of injury. Normal range of motion.      Cervical back: Normal range of motion and neck supple. No rigidity. No muscular tenderness.      Right lower leg: No edema.      Left lower leg: No edema.   Skin:     General: Skin is warm.       Coloration: Skin is not jaundiced or pale.      Findings: No bruising or lesion.   Neurological:      General: No focal deficit present.      Mental Status: He is alert and oriented to person, place, and time.      Cranial Nerves: No cranial nerve deficit.      Sensory: No sensory deficit.      Motor: No weakness.      Gait: Gait normal.   Psychiatric:         Mood and Affect: Mood normal.         Behavior: Behavior normal.         Thought Content: Thought content normal.               Vitals:    01/17/24 1001   BP: 120/77   Pulse: 60   Resp: 18         Body surface area is 1.96 meters squared.    Assessment/Plan:      ECOG 1    Glioma :    == Grade 2 glioma of the left frontal lobe status post subtotal resection in January of 2023.   == s/p Chemo-XRT completed recently and now on adjuvant Temodar tolerating well.  == I will add Bactrim DS for PCP prohylaxis at this time and see patient prior to C#2  == 7/10/23: Labs reviewed. Tolerating Bactrim DS well with no dose limiting toxicities. Voices no complaints. Next MRI brain in Aug 2023  ==8/07/23: First cycle of adjuvant Temozolomide well tolerated, patient has some mild nausea relieved with phenergan prn. He is not taking ondansetron states phenergan works better.  Temozolomide dosage is 390mg days 1-5 q 28 days.  Reports good compliance with bactrim for pcp prophylaxis. MRI Brain reviewed Next MRI brain is scheduled November 6th 2023.  No headaches, visual changes. Good compliance with Keppra, will check keppra level with labs.  ==09/05/23: Patient is s/p 2 cycles of adjuvant Temozolomide, has intermittent nausea relieved with phenergan.  No s/s infection, is on bactrim for pcp prophylaxis.  Plan to continue adjuvant Temozolomide 6-12 cycles with MRI brain on 11/6/23 has set up per radiation oncology. Keppra level not drawn, will add to orders  ==10/09/23: Patient doing well, recently saw neurosurgery with MRI brain which was noted to have continued stable  residual disease.  Temozolomide well tolerated, reports occasional nausea relieved with ondansetron.  Will continue with Temozolomide, patient is cleared to start cycle 4 with 6-12 planned adjuvant cycles. Next MRI brain scheduled by neurosurgery in 4 months  ==11/6/23: Patient due to start cycle 5 adjuvant Temozolomide.  Tolerating well.  ==12/20/23: MRI brain 12/4/23 shows stable disease with post radiation changes. Stable T2 hyperintense signal at the operative cavity which may be a combination of tumor and or gliosis.  ==01/17/2024: Pt due for cycle 7 adjuvant Temozolomide.  Pt scheduled for MRI brain on Feb 26, 2024.      Plan:  Continue Temodar for 6-12 planned adjuvant cycles  Continue Bactrim DS for PCP prophylaxis  RTC 4 weeks for MD visit with labs: CBC, CMP prior      Total time spent in counseling and discussion about further management options including relevant lab work, treatment,  prognosis, medications and intended side effects was more than 25 minutes. More than 50% of the time was spent on counseling and coordination of care.  I spent a total of 25 minutes on the day of the visit.This includes face to face time and non-face to face time preparing to see the patient (eg, review of tests), Obtaining and/or reviewing separately obtained history, Documenting clinical information in the electronic or other health record, Independently interpreting resultsand communicating results to the patient/family/caregiver, or Care coordination.

## 2024-01-17 NOTE — PROGRESS NOTES
MEDICAL ONCOLOGY FOLLOW UP CONSULTATION NOTE    Patient ID: Marvin Pool is a 48 y.o. male.    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  He also completed chemo XRT and now is currently on adjuvant Temodar.  He would like to establish care here locally in [place] and voices no acute complaints reporting that he is tolerated Temodar without any side effects at this time.  He presents to our clinic today for further evaluation        Imaging:       MRI brain:  01/12/23    Expected immediate postsurgical changes of left frontal craniotomy for frontal mass resection.     Persistent area of T2 FLAIR hyperintense signal surrounding the resection is similar size compared to preoperative MRI. Typical thin peripheral enhancement around the resection cavity.        Past Medical History:   Diagnosis Date    Brain tumor 1/12/2023    Seizures 1/12/2023     Family History   Problem Relation Age of Onset    No Known Problems Mother     No Known Problems Father     No Known Problems Sister     No Known Problems Brother     No Known Problems Maternal Aunt      Social History     Socioeconomic History    Marital status:    Tobacco Use    Smoking status: Never    Smokeless tobacco: Never   Substance and Sexual Activity    Alcohol use: Not Currently     Social Determinants of Health     Financial Resource Strain: Low Risk  (11/16/2023)    Overall Financial Resource Strain (CARDIA)     Difficulty of Paying Living Expenses: Not very hard   Food Insecurity: No Food Insecurity (11/16/2023)    Hunger Vital Sign     Worried About Running Out of Food in the Last Year: Never true     Ran Out of Food in the Last Year: Never true   Transportation Needs: No Transportation Needs (11/16/2023)    PRAPARE - Transportation     Lack of Transportation (Medical): No      Lack of Transportation (Non-Medical): No   Physical Activity: Sufficiently Active (11/16/2023)    Exercise Vital Sign     Days of Exercise per Week: 6 days     Minutes of Exercise per Session: 120 min   Stress: Stress Concern Present (11/16/2023)    [employer] - Occupational Stress Questionnaire     Feeling of Stress : Rather much   Social Connections: Unknown (11/16/2023)    Social Connection and Isolation Panel [NHANES]     Frequency of Communication with Friends and Family: More than three times a week     Frequency of Social Gatherings with Friends and Family: Three times a week     Active Member of Clubs or Organizations: No     Attends Club or Organization Meetings: Never     Marital Status:    Housing Stability: High Risk (11/16/2023)    Housing Stability Vital Sign     Unable to Pay for Housing in the Last Year: Yes     Number of Places Lived in the Last Year: 1     Unstable Housing in the Last Year: No     Past Surgical History:   Procedure Laterality Date    CRANIOTOMY USING FRAMELESS STEREOTAXY Left 1/12/2023    Procedure: CRANIOTOMY, USING FRAMELESS STEREOTAXY;  Surgeon: Wellington Park MD;  Location: [address];  Service: Neurosurgery;  Laterality: Left;         Review of systems:  Review of Systems   Constitutional:  Negative for activity change, appetite change, chills, diaphoresis, fatigue and unexpected weight change.   HENT:  Negative for congestion, facial swelling, hearing loss, mouth sores, trouble swallowing and voice change.    Eyes:  Negative for photophobia, pain, discharge and itching.   Respiratory:  Negative for apnea, cough, choking, chest tightness and shortness of breath.    Cardiovascular:  Negative for chest pain, palpitations and leg swelling.   Gastrointestinal:  Negative for abdominal distention, abdominal pain, anal bleeding and blood in stool.   Endocrine: Negative for cold intolerance, heat intolerance, polydipsia and polyphagia.    Genitourinary:  Negative for difficulty urinating, dysuria, flank pain and hematuria.   Musculoskeletal:  Negative for arthralgias, back pain, joint swelling, myalgias, neck pain and neck stiffness.   Skin:  Negative for color change, pallor and wound.   Allergic/Immunologic: Negative for environmental allergies, food allergies and immunocompromised state.   Neurological:  Positive for speech difficulty and headaches. Negative for dizziness, seizures, facial asymmetry, light-headedness and numbness.   Hematological:  Negative for adenopathy. Does not bruise/bleed easily.   Psychiatric/Behavioral:  Negative for agitation, behavioral problems, confusion, decreased concentration and sleep disturbance.            Physical Exam  Vitals and nursing note reviewed.   Constitutional:       General: He is not in acute distress.     Appearance: Normal appearance. He is not ill-appearing.   HENT:      Head: Normocephalic and atraumatic.      Nose: No congestion or rhinorrhea.   Eyes:      General: No scleral icterus.     Extraocular Movements: Extraocular movements intact.      Pupils: Pupils are equal, round, and reactive to light.   Cardiovascular:      Rate and Rhythm: Normal rate and regular rhythm.      Pulses: Normal pulses.      Heart sounds: Normal heart sounds. No murmur heard.     No gallop.   Pulmonary:      Effort: Pulmonary effort is normal. No respiratory distress.      Breath sounds: Normal breath sounds. No stridor. No wheezing or rhonchi.   Abdominal:      General: Bowel sounds are normal. There is no distension.      Palpations: There is no mass.      Tenderness: There is no abdominal tenderness. There is no guarding.   Musculoskeletal:         General: No swelling, tenderness, deformity or signs of injury. Normal range of motion.      Cervical back: Normal range of motion and neck supple. No rigidity. No muscular tenderness.      Right lower leg: No edema.      Left lower leg: No edema.   Skin:     General:  Skin is warm.      Coloration: Skin is not jaundiced or pale.      Findings: No bruising or lesion.   Neurological:      General: No focal deficit present.      Mental Status: He is alert and oriented to person, place, and time.      Cranial Nerves: No cranial nerve deficit.      Sensory: No sensory deficit.      Motor: No weakness.      Gait: Gait normal.   Psychiatric:         Mood and Affect: Mood normal.         Behavior: Behavior normal.         Thought Content: Thought content normal.               Vitals:    01/17/24 1001   BP: 120/77   Pulse: 60   Resp: 18       Body surface area is 1.96 meters squared.    Assessment/Plan:      ECOG 1    Glioma :    == Grade 2 glioma of the left frontal lobe status post subtotal resection in January of 2023.   == s/p Chemo-XRT completed recently and now on adjuvant Temodar tolerating well.  == I will add Bactrim DS for PCP prohylaxis at this time and see patient prior to C#2  == 7/10/23: Labs reviewed. Tolerating Bactrim DS well with no dose limiting toxicities. Voices no complaints. Next MRI brain in Aug 2023  ==8/07/23: First cycle of adjuvant Temozolomide well tolerated, patient has some mild nausea relieved with phenergan prn. He is not taking ondansetron states phenergan works better.  Temozolomide dosage is 390mg days 1-5 q 28 days.  Reports good compliance with bactrim for pcp prophylaxis. MRI Brain reviewed Next MRI brain is scheduled November 6th 2023.  No headaches, visual changes. Good compliance with Keppra, will check keppra level with labs.  ==09/05/23: Patient is s/p 2 cycles of adjuvant Temozolomide, has intermittent nausea relieved with phenergan.  No s/s infection, is on bactrim for pcp prophylaxis.  Plan to continue adjuvant Temozolomide 6-12 cycles with MRI brain on 11/6/23 has set up per radiation oncology. Keppra level not drawn, will add to orders  ==10/09/23: Patient doing well, recently saw neurosurgery with MRI brain which was noted to have  continued stable residual disease.  Temozolomide well tolerated, reports occasional nausea relieved with ondansetron.  Will continue with Temozolomide, patient is cleared to start cycle 4 with 6-12 planned adjuvant cycles. Next MRI brain scheduled by neurosurgery in 4 months  ==11/6/23: Patient due to start cycle 5 adjuvant Temozolomide.  Tolerating well.  ==12/20/23: MRI brain 12/4/23 shows stable disease with post radiation changes. Stable T2 hyperintense signal at the operative cavity which may be a combination of tumor and or gliosis.  ==01/17/2024: Pt due for cycle 7 adjuvant Temozolomide.  He reports some headaches, mild expressive aphasia and stuttering intermittent for about last month.  He has MRI scheduled on 2/26/2024 scheduled by Dr. Park, patient's neurosurgeon.  I sent a message to Dr. Park regarding MRI to see if he would like to move it up.     ==01/18/2024 Addendum: message received from Dr. Park, neurosurgeon.  He will have his office move up patient's MRI to be completed in [facility].  His office will contact patient    Plan:  Continue Temodar for 6-12 planned adjuvant cycles  Continue Bactrim DS for PCP prophylaxis  RTC 4 weeks for MD visit with labs: CBC, CMP prior      Total time spent in counseling and discussion about further management options including relevant lab work, treatment,  prognosis, medications and intended side effects was more than 25 minutes. More than 50% of the time was spent on counseling and coordination of care.  I spent a total of 25 minutes on the day of the visit.This includes face to face time and non-face to face time preparing to see the patient (eg, review of tests), Obtaining and/or reviewing separately obtained history, Documenting clinical information in the electronic or other health record, Independently interpreting resultsand communicating results to the patient/family/caregiver, or Care coordination.

## 2024-01-17 NOTE — PROGRESS NOTES
MEDICAL ONCOLOGY FOLLOW UP CONSULTATION NOTE    Patient ID: Marvin Pool is a 48 y.o. male.    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  He also completed chemo XRT and now is currently on adjuvant Temodar.  He would like to establish care here locally in [place] and voices no acute complaints reporting that he is tolerated Temodar without any side effects at this time.  He presents to our clinic today for further evaluation        Imaging:       MRI brain:  01/12/23    Expected immediate postsurgical changes of left frontal craniotomy for frontal mass resection.     Persistent area of T2 FLAIR hyperintense signal surrounding the resection is similar size compared to preoperative MRI. Typical thin peripheral enhancement around the resection cavity.        Past Medical History:   Diagnosis Date    Brain tumor 1/12/2023    Seizures 1/12/2023     Family History   Problem Relation Age of Onset    No Known Problems Mother     No Known Problems Father     No Known Problems Sister     No Known Problems Brother     No Known Problems Maternal Aunt      Social History     Socioeconomic History    Marital status:    Tobacco Use    Smoking status: Never    Smokeless tobacco: Never   Substance and Sexual Activity    Alcohol use: Not Currently     Social Determinants of Health     Financial Resource Strain: Low Risk  (11/16/2023)    Overall Financial Resource Strain (CARDIA)     Difficulty of Paying Living Expenses: Not very hard   Food Insecurity: No Food Insecurity (11/16/2023)    Hunger Vital Sign     Worried About Running Out of Food in the Last Year: Never true     Ran Out of Food in the Last Year: Never true   Transportation Needs: No Transportation Needs (11/16/2023)    PRAPARE - Transportation     Lack of Transportation (Medical): No      Lack of Transportation (Non-Medical): No   Physical Activity: Sufficiently Active (11/16/2023)    Exercise Vital Sign     Days of Exercise per Week: 6 days     Minutes of Exercise per Session: 120 min   Stress: Stress Concern Present (11/16/2023)    [place] [facility] [employer] - Occupational Stress Questionnaire     Feeling of Stress : Rather much   Social Connections: Unknown (11/16/2023)    Social Connection and Isolation Panel [NHANES]     Frequency of Communication with Friends and Family: More than three times a week     Frequency of Social Gatherings with Friends and Family: Three times a week     Active Member of Clubs or Organizations: No     Attends Club or Organization Meetings: Never     Marital Status:    Housing Stability: High Risk (11/16/2023)    Housing Stability Vital Sign     Unable to Pay for Housing in the Last Year: Yes     Number of Places Lived in the Last Year: 1     Unstable Housing in the Last Year: No     Past Surgical History:   Procedure Laterality Date    CRANIOTOMY USING FRAMELESS STEREOTAXY Left 1/12/2023    Procedure: CRANIOTOMY, USING FRAMELESS STEREOTAXY;  Surgeon: Wellington Park MD;  Location: [address];  Service: Neurosurgery;  Laterality: Left;         Review of systems:  Review of Systems   Constitutional:  Negative for activity change, appetite change, chills, diaphoresis, fatigue and unexpected weight change.   HENT:  Negative for congestion, facial swelling, hearing loss, mouth sores, trouble swallowing and voice change.    Eyes:  Negative for photophobia, pain, discharge and itching.   Respiratory:  Negative for apnea, cough, choking, chest tightness and shortness of breath.    Cardiovascular:  Negative for chest pain, palpitations and leg swelling.   Gastrointestinal:  Negative for abdominal distention, abdominal pain, anal bleeding and blood in stool.   Endocrine: Negative for cold intolerance, heat intolerance, polydipsia and polyphagia.    Genitourinary:  Negative for difficulty urinating, dysuria, flank pain and hematuria.   Musculoskeletal:  Negative for arthralgias, back pain, joint swelling, myalgias, neck pain and neck stiffness.   Skin:  Negative for color change, pallor and wound.   Allergic/Immunologic: Negative for environmental allergies, food allergies and immunocompromised state.   Neurological:  Negative for dizziness, seizures, facial asymmetry, speech difficulty, light-headedness, numbness and headaches.   Hematological:  Negative for adenopathy. Does not bruise/bleed easily.   Psychiatric/Behavioral:  Negative for agitation, behavioral problems, confusion, decreased concentration and sleep disturbance.            Physical Exam  Vitals and nursing note reviewed.   Constitutional:       General: He is not in acute distress.     Appearance: Normal appearance. He is not ill-appearing.   HENT:      Head: Normocephalic and atraumatic.      Nose: No congestion or rhinorrhea.   Eyes:      General: No scleral icterus.     Extraocular Movements: Extraocular movements intact.      Pupils: Pupils are equal, round, and reactive to light.   Cardiovascular:      Rate and Rhythm: Normal rate and regular rhythm.      Pulses: Normal pulses.      Heart sounds: Normal heart sounds. No murmur heard.     No gallop.   Pulmonary:      Effort: Pulmonary effort is normal. No respiratory distress.      Breath sounds: Normal breath sounds. No stridor. No wheezing or rhonchi.   Abdominal:      General: Bowel sounds are normal. There is no distension.      Palpations: There is no mass.      Tenderness: There is no abdominal tenderness. There is no guarding.   Musculoskeletal:         General: No swelling, tenderness, deformity or signs of injury. Normal range of motion.      Cervical back: Normal range of motion and neck supple. No rigidity. No muscular tenderness.      Right lower leg: No edema.      Left lower leg: No edema.   Skin:     General: Skin is warm.       Coloration: Skin is not jaundiced or pale.      Findings: No bruising or lesion.   Neurological:      General: No focal deficit present.      Mental Status: He is alert and oriented to person, place, and time.      Cranial Nerves: No cranial nerve deficit.      Sensory: No sensory deficit.      Motor: No weakness.      Gait: Gait normal.   Psychiatric:         Mood and Affect: Mood normal.         Behavior: Behavior normal.         Thought Content: Thought content normal.               Vitals:    01/17/24 1001   BP: 120/77   Pulse: 60   Resp: 18         Body surface area is 1.96 meters squared.    Assessment/Plan:      ECOG 1    Glioma :    == Grade 2 glioma of the left frontal lobe status post subtotal resection in January of 2023.   == s/p Chemo-XRT completed recently and now on adjuvant Temodar tolerating well.  == I will add Bactrim DS for PCP prohylaxis at this time and see patient prior to C#2  == 7/10/23: Labs reviewed. Tolerating Bactrim DS well with no dose limiting toxicities. Voices no complaints. Next MRI brain in Aug 2023  ==8/07/23: First cycle of adjuvant Temozolomide well tolerated, patient has some mild nausea relieved with phenergan prn. He is not taking ondansetron states phenergan works better.  Temozolomide dosage is 390mg days 1-5 q 28 days.  Reports good compliance with bactrim for pcp prophylaxis. MRI Brain reviewed Next MRI brain is scheduled November 6th 2023.  No headaches, visual changes. Good compliance with Keppra, will check keppra level with labs.  ==09/05/23: Patient is s/p 2 cycles of adjuvant Temozolomide, has intermittent nausea relieved with phenergan.  No s/s infection, is on bactrim for pcp prophylaxis.  Plan to continue adjuvant Temozolomide 6-12 cycles with MRI brain on 11/6/23 has set up per radiation oncology. Keppra level not drawn, will add to orders  ==10/09/23: Patient doing well, recently saw neurosurgery with MRI brain which was noted to have continued stable  residual disease.  Temozolomide well tolerated, reports occasional nausea relieved with ondansetron.  Will continue with Temozolomide, patient is cleared to start cycle 4 with 6-12 planned adjuvant cycles. Next MRI brain scheduled by neurosurgery in 4 months  ==11/6/23: Patient due to start cycle 5 adjuvant Temozolomide.  Tolerating well.  ==12/20/23: MRI brain 12/4/23 shows stable disease with post radiation changes. Stable T2 hyperintense signal at the operative cavity which may be a combination of tumor and or gliosis.  ==01/17/2024: Pt due for cycle 7 adjuvant Temozolomide.  Pt scheduled for MRI brain on Feb 26, 2024.      Plan:  Continue Temodar for 6-12 planned adjuvant cycles  Continue Bactrim DS for PCP prophylaxis  RTC 4 weeks for MD visit with labs: CBC, CMP prior      Total time spent in counseling and discussion about further management options including relevant lab work, treatment,  prognosis, medications and intended side effects was more than 25 minutes. More than 50% of the time was spent on counseling and coordination of care.  I spent a total of 25 minutes on the day of the visit.This includes face to face time and non-face to face time preparing to see the patient (eg, review of tests), Obtaining and/or reviewing separately obtained history, Documenting clinical information in the electronic or other health record, Independently interpreting resultsand communicating results to the patient/family/caregiver, or Care coordination.

## 2024-01-17 NOTE — PROGRESS NOTES
MEDICAL ONCOLOGY FOLLOW UP CONSULTATION NOTE    Patient ID: Marvin Pool is a 48 y.o. male.    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  He also completed chemo XRT and now is currently on adjuvant Temodar.  He would like to establish care here locally in [place] and voices no acute complaints reporting that he is tolerated Temodar without any side effects at this time.  He presents to our clinic today for further evaluation        Imaging:       MRI brain:  01/12/23    Expected immediate postsurgical changes of left frontal craniotomy for frontal mass resection.     Persistent area of T2 FLAIR hyperintense signal surrounding the resection is similar size compared to preoperative MRI. Typical thin peripheral enhancement around the resection cavity.        Past Medical History:   Diagnosis Date    Brain tumor 1/12/2023    Seizures 1/12/2023     Family History   Problem Relation Age of Onset    No Known Problems Mother     No Known Problems Father     No Known Problems Sister     No Known Problems Brother     No Known Problems Maternal Aunt      Social History     Socioeconomic History    Marital status:    Tobacco Use    Smoking status: Never    Smokeless tobacco: Never   Substance and Sexual Activity    Alcohol use: Not Currently     Social Determinants of Health     Financial Resource Strain: Low Risk  (11/16/2023)    Overall Financial Resource Strain (CARDIA)     Difficulty of Paying Living Expenses: Not very hard   Food Insecurity: No Food Insecurity (11/16/2023)    Hunger Vital Sign     Worried About Running Out of Food in the Last Year: Never true     Ran Out of Food in the Last Year: Never true   Transportation Needs: No Transportation Needs (11/16/2023)    PRAPARE - Transportation     Lack of Transportation (Medical): No      Lack of Transportation (Non-Medical): No   Physical Activity: Sufficiently Active (11/16/2023)    Exercise Vital Sign     Days of Exercise per Week: 6 days     Minutes of Exercise per Session: 120 min   Stress: Stress Concern Present (11/16/2023)    [facility] [employer] - Occupational Stress Questionnaire     Feeling of Stress : Rather much   Social Connections: Unknown (11/16/2023)    Social Connection and Isolation Panel [NHANES]     Frequency of Communication with Friends and Family: More than three times a week     Frequency of Social Gatherings with Friends and Family: Three times a week     Active Member of Clubs or Organizations: No     Attends Club or Organization Meetings: Never     Marital Status:    Housing Stability: High Risk (11/16/2023)    Housing Stability Vital Sign     Unable to Pay for Housing in the Last Year: Yes     Number of Places Lived in the Last Year: 1     Unstable Housing in the Last Year: No     Past Surgical History:   Procedure Laterality Date    CRANIOTOMY USING FRAMELESS STEREOTAXY Left 1/12/2023    Procedure: CRANIOTOMY, USING FRAMELESS STEREOTAXY;  Surgeon: Wellington Park MD;  Location: [address];  Service: Neurosurgery;  Laterality: Left;         Review of systems:  Review of Systems   Constitutional:  Negative for activity change, appetite change, chills, diaphoresis, fatigue and unexpected weight change.   HENT:  Negative for congestion, facial swelling, hearing loss, mouth sores, trouble swallowing and voice change.    Eyes:  Negative for photophobia, pain, discharge and itching.   Respiratory:  Negative for apnea, cough, choking, chest tightness and shortness of breath.    Cardiovascular:  Negative for chest pain, palpitations and leg swelling.   Gastrointestinal:  Negative for abdominal distention, abdominal pain, anal bleeding and blood in stool.   Endocrine: Negative for cold intolerance, heat intolerance, polydipsia and polyphagia.    Genitourinary:  Negative for difficulty urinating, dysuria, flank pain and hematuria.   Musculoskeletal:  Negative for arthralgias, back pain, joint swelling, myalgias, neck pain and neck stiffness.   Skin:  Negative for color change, pallor and wound.   Allergic/Immunologic: Negative for environmental allergies, food allergies and immunocompromised state.   Neurological:  Negative for dizziness, seizures, facial asymmetry, speech difficulty, light-headedness, numbness and headaches.   Hematological:  Negative for adenopathy. Does not bruise/bleed easily.   Psychiatric/Behavioral:  Negative for agitation, behavioral problems, confusion, decreased concentration and sleep disturbance.            Physical Exam  Vitals and nursing note reviewed.   Constitutional:       General: He is not in acute distress.     Appearance: Normal appearance. He is not ill-appearing.   HENT:      Head: Normocephalic and atraumatic.      Nose: No congestion or rhinorrhea.   Eyes:      General: No scleral icterus.     Extraocular Movements: Extraocular movements intact.      Pupils: Pupils are equal, round, and reactive to light.   Cardiovascular:      Rate and Rhythm: Normal rate and regular rhythm.      Pulses: Normal pulses.      Heart sounds: Normal heart sounds. No murmur heard.     No gallop.   Pulmonary:      Effort: Pulmonary effort is normal. No respiratory distress.      Breath sounds: Normal breath sounds. No stridor. No wheezing or rhonchi.   Abdominal:      General: Bowel sounds are normal. There is no distension.      Palpations: There is no mass.      Tenderness: There is no abdominal tenderness. There is no guarding.   Musculoskeletal:         General: No swelling, tenderness, deformity or signs of injury. Normal range of motion.      Cervical back: Normal range of motion and neck supple. No rigidity. No muscular tenderness.      Right lower leg: No edema.      Left lower leg: No edema.   Skin:     General: Skin is warm.       Coloration: Skin is not jaundiced or pale.      Findings: No bruising or lesion.   Neurological:      General: No focal deficit present.      Mental Status: He is alert and oriented to person, place, and time.      Cranial Nerves: No cranial nerve deficit.      Sensory: No sensory deficit.      Motor: No weakness.      Gait: Gait normal.   Psychiatric:         Mood and Affect: Mood normal.         Behavior: Behavior normal.         Thought Content: Thought content normal.               There were no vitals filed for this visit.      There is no height or weight on file to calculate BSA.    Assessment/Plan:      ECOG 1    Glioma :    == Grade 2 glioma of the left frontal lobe status post subtotal resection in January of 2023.   == s/p Chemo-XRT completed recently and now on adjuvant Temodar tolerating well.  == I will add Bactrim DS for PCP prohylaxis at this time and see patient prior to C#2  == 7/10/23: Labs reviewed. Tolerating Bactrim DS well with no dose limiting toxicities. Voices no complaints. Next MRI brain in Aug 2023  ==8/07/23: First cycle of adjuvant Temozolomide well tolerated, patient has some mild nausea relieved with phenergan prn. He is not taking ondansetron states phenergan works better.  Temozolomide dosage is 390mg days 1-5 q 28 days.  Reports good compliance with bactrim for pcp prophylaxis. MRI Brain reviewed Next MRI brain is scheduled November 6th 2023.  No headaches, visual changes. Good compliance with Keppra, will check keppra level with labs.  ==09/05/23: Patient is s/p 2 cycles of adjuvant Temozolomide, has intermittent nausea relieved with phenergan.  No s/s infection, is on bactrim for pcp prophylaxis.  Plan to continue adjuvant Temozolomide 6-12 cycles with MRI brain on 11/6/23 has set up per radiation oncology. Keppra level not drawn, will add to orders  ==10/09/23: Patient doing well, recently saw neurosurgery with MRI brain which was noted to have continued stable residual  disease.  Temozolomide well tolerated, reports occasional nausea relieved with ondansetron.  Will continue with Temozolomide, patient is cleared to start cycle 4 with 6-12 planned adjuvant cycles. Next MRI brain scheduled by neurosurgery in 4 months  ==11/6/23: Patient due to start cycle 5 adjuvant Temozolomide.  Tolerating well.  ==12/20/23: MRI brain 12/4/23 shows stable disease with post radiation changes. Stable T2 hyperintense signal at the operative cavity which may be a combination of tumor and or gliosis.  ==01/17/2024: Pt due for cycle 7 adjuvant Temozolomide.  He reports some headaches, mild expressive aphasia and stuttering intermittent for about last month.  He has MRI scheduled on 2/26/2024 scheduled by Dr. Park, patient's neurosurgeon.  I sent a message to Dr. Park regarding MRI to see if he would like to move it up.      Plan:  Continue Temodar for 6-12 planned adjuvant cycles  Continue Bactrim DS for PCP prophylaxis  RTC 4 weeks for MD visit with labs: CBC, CMP prior      Total time spent in counseling and discussion about further management options including relevant lab work, treatment,  prognosis, medications and intended side effects was more than 25 minutes. More than 50% of the time was spent on counseling and coordination of care.  I spent a total of 25 minutes on the day of the visit.This includes face to face time and non-face to face time preparing to see the patient (eg, review of tests), Obtaining and/or reviewing separately obtained history, Documenting clinical information in the electronic or other health record, Independently interpreting resultsand communicating results to the patient/family/caregiver, or Care coordination.

## 2024-01-18 ENCOUNTER — PATIENT MESSAGE (OUTPATIENT)
Dept: NEUROSURGERY | Facility: CLINIC | Age: 49
End: 2024-01-18
Payer: COMMERCIAL

## 2024-01-30 ENCOUNTER — OFFICE VISIT (OUTPATIENT)
Dept: UROLOGY | Facility: CLINIC | Age: 49
End: 2024-01-30
Payer: COMMERCIAL

## 2024-01-30 VITALS
HEART RATE: 66 BPM | DIASTOLIC BLOOD PRESSURE: 71 MMHG | OXYGEN SATURATION: 96 % | BODY MASS INDEX: 23.62 KG/M2 | WEIGHT: 165 LBS | HEIGHT: 70 IN | SYSTOLIC BLOOD PRESSURE: 120 MMHG

## 2024-01-30 DIAGNOSIS — R39.198 DIFFICULTY VOIDING: Primary | ICD-10-CM

## 2024-01-30 DIAGNOSIS — R33.9 INCOMPLETE BLADDER EMPTYING: ICD-10-CM

## 2024-01-30 DIAGNOSIS — R39.15 URINARY URGENCY: ICD-10-CM

## 2024-01-30 DIAGNOSIS — R35.0 URINARY FREQUENCY: ICD-10-CM

## 2024-01-30 LAB
BILIRUBIN, UA POC OHS: NEGATIVE
BLOOD, UA POC OHS: NEGATIVE
CLARITY, UA POC OHS: CLEAR
COLOR, UA POC OHS: YELLOW
GLUCOSE, UA POC OHS: NEGATIVE
KETONES, UA POC OHS: NEGATIVE
LEUKOCYTES, UA POC OHS: NEGATIVE
NITRITE, UA POC OHS: NEGATIVE
PH, UA POC OHS: 6.5
PROTEIN, UA POC OHS: NEGATIVE
SPECIFIC GRAVITY, UA POC OHS: <=1.005
UROBILINOGEN, UA POC OHS: 0.2

## 2024-01-30 PROCEDURE — 1159F MED LIST DOCD IN RCRD: CPT | Mod: CPTII,S$GLB,, | Performed by: NURSE PRACTITIONER

## 2024-01-30 PROCEDURE — 3008F BODY MASS INDEX DOCD: CPT | Mod: CPTII,S$GLB,, | Performed by: NURSE PRACTITIONER

## 2024-01-30 PROCEDURE — 3078F DIAST BP <80 MM HG: CPT | Mod: CPTII,S$GLB,, | Performed by: NURSE PRACTITIONER

## 2024-01-30 PROCEDURE — 99214 OFFICE O/P EST MOD 30 MIN: CPT | Mod: S$GLB,,, | Performed by: NURSE PRACTITIONER

## 2024-01-30 PROCEDURE — 3074F SYST BP LT 130 MM HG: CPT | Mod: CPTII,S$GLB,, | Performed by: NURSE PRACTITIONER

## 2024-01-30 PROCEDURE — 81003 URINALYSIS AUTO W/O SCOPE: CPT | Mod: QW,S$GLB,, | Performed by: NURSE PRACTITIONER

## 2024-01-30 PROCEDURE — 1160F RVW MEDS BY RX/DR IN RCRD: CPT | Mod: CPTII,S$GLB,, | Performed by: NURSE PRACTITIONER

## 2024-01-30 RX ORDER — FINASTERIDE 5 MG/1
5 TABLET, FILM COATED ORAL DAILY
Qty: 90 TABLET | Refills: 3 | Status: SHIPPED | OUTPATIENT
Start: 2024-01-30 | End: 2025-01-29

## 2024-01-30 NOTE — PROGRESS NOTES
Subjective:       Patient ID: Marvin Pool is a 48 y.o. male.    Chief Complaint: Follow-up      HPI: 48-year-old male, presents for 3 month follow-up.  Patient has history of difficulty voiding frequency and urgency.  He also has some incomplete bladder emptying.    Patient initially presented with complaint of frequency and urgency.  Bladder scan was elevated.    He was started on Flomax.  At that time he admits to drinking a lot of sodas and coffee.  He has since decreased both.  On his initial follow-up his bladder scan was elevated at 371 cc.  We then increased his Flomax.  Patient presents today stating that he still has some frequency and urgency.  He does notice some improvement with Flomax 0.8 mg and decreasing coffee and Cokes.    No other urinary complaints this time.         Past Medical History:   Past Medical History:   Diagnosis Date    Brain tumor 1/12/2023    Seizures 1/12/2023       Past Surgical Historical:   Past Surgical History:   Procedure Laterality Date    CRANIOTOMY USING FRAMELESS STEREOTAXY Left 1/12/2023    Procedure: CRANIOTOMY, USING FRAMELESS STEREOTAXY;  Surgeon: Wellington Park MD;  Location: [facility] OR [address] [place] [address];  Service: Neurosurgery;  Laterality: Left;        Medications:   Medication List with Changes/Refills   New Medications    FINASTERIDE (PROSCAR) 5 MG TABLET    Take 1 tablet (5 mg total) by mouth once daily.   Current Medications    ACETAMINOPHEN (TYLENOL) 500 MG TABLET    Take 1,000 mg by mouth every 6 (six) hours as needed for Pain.    KLONOPIN 0.5 MG TABLET    Take 0.5 mg by mouth daily as needed.    LEVETIRACETAM (KEPPRA) 500 MG TAB    Take 500 mg by mouth 2 (two) times daily.    OMEPRAZOLE (PRILOSEC) 40 MG CAPSULE    Take 40 mg by mouth every morning.    PROMETHAZINE (PHENERGAN) 25 MG TABLET    Take 25 mg by mouth every 6 (six) hours as needed.    SULFAMETHOXAZOLE-TRIMETHOPRIM 800-160MG (BACTRIM DS) 800-160 MG TAB    Take 1 tablet by mouth once daily    TAMSULOSIN  (FLOMAX) 0.4 MG CAP    Take 2 capsules (0.8 mg total) by mouth once daily.    TEMOZOLOMIDE (TEMODAR) 140 MG CAPSULE    TAKE 1 CAPSULE  MG WITH 1 CAPSULE  MG ONCE DAILY ON DAYS 1 THROUGH 5 OF A 28 DAY CYCLE (TOTAL DOSE 390 MG).    TEMOZOLOMIDE (TEMODAR) 250 MG CAPSULE    TAKE 1 CAPSULE  MG WITH 1 CAPSULE  MG (390 MG TOTAL) ONCE DAILY ON DAYS 1 THROUGH 5 EVERY 28 DAYS (TAKE ON AN EMPTY STOMACH).    ZOLPIDEM (AMBIEN) 10 MG TAB    Take 10 mg by mouth nightly as needed.        Past Social History:   Social History     Socioeconomic History    Marital status:    Tobacco Use    Smoking status: Never    Smokeless tobacco: Never   Substance and Sexual Activity    Alcohol use: Not Currently     Social Determinants of Health     Financial Resource Strain: Low Risk  (11/16/2023)    Overall Financial Resource Strain (CARDIA)     Difficulty of Paying Living Expenses: Not very hard   Food Insecurity: No Food Insecurity (11/16/2023)    Hunger Vital Sign     Worried About Running Out of Food in the Last Year: Never true     Ran Out of Food in the Last Year: Never true   Transportation Needs: No Transportation Needs (11/16/2023)    PRAPARE - Transportation     Lack of Transportation (Medical): No     Lack of Transportation (Non-Medical): No   Physical Activity: Sufficiently Active (11/16/2023)    Exercise Vital Sign     Days of Exercise per Week: 6 days     Minutes of Exercise per Session: 120 min   Stress: Stress Concern Present (11/16/2023)    [employer] - Occupational Stress Questionnaire     Feeling of Stress : Rather much   Social Connections: Unknown (11/16/2023)    Social Connection and Isolation Panel [NHANES]     Frequency of Communication with Friends and Family: More than three times a week     Frequency of Social Gatherings with Friends and Family: Three times a week     Active Member of Clubs or Organizations: No     Attends Club or Organization Meetings: Never      Marital Status:    Housing Stability: High Risk (11/16/2023)    Housing Stability Vital Sign     Unable to Pay for Housing in the Last Year: Yes     Number of Places Lived in the Last Year: 1     Unstable Housing in the Last Year: No       Allergies: Review of patient's allergies indicates:  No Known Allergies     Family History:   Family History   Problem Relation Age of Onset    No Known Problems Mother     No Known Problems Father     No Known Problems Sister     No Known Problems Brother     No Known Problems Maternal Aunt         Review of Systems:  Review of Systems   Constitutional:  Negative for activity change and appetite change.   HENT:  Negative for congestion and dental problem.    Eyes:  Negative for visual disturbance.   Respiratory:  Negative for chest tightness and shortness of breath.    Cardiovascular:  Negative for chest pain.   Gastrointestinal:  Negative for abdominal distention and abdominal pain.   Genitourinary:  Positive for difficulty urinating, frequency and urgency. Negative for decreased urine volume, dysuria, enuresis, flank pain, genital sores, hematuria, penile discharge, penile pain, penile swelling, scrotal swelling and testicular pain.   Musculoskeletal:  Negative for back pain and neck pain.   Skin:  Negative for color change.   Neurological:  Negative for dizziness.   Hematological:  Negative for adenopathy.   Psychiatric/Behavioral:  Negative for agitation, behavioral problems and confusion.        Physical Exam:  Physical Exam  Vitals and nursing note reviewed.   Constitutional:       Appearance: He is well-developed.   HENT:      Head: Normocephalic.   Eyes:      Pupils: Pupils are equal, round, and reactive to light.   Cardiovascular:      Rate and Rhythm: Normal rate and regular rhythm.      Heart sounds: Normal heart sounds.   Pulmonary:      Effort: Pulmonary effort is normal.      Breath sounds: Normal breath sounds.   Abdominal:      General: Bowel sounds are  normal.      Palpations: Abdomen is soft.   Musculoskeletal:         General: Normal range of motion.      Cervical back: Normal range of motion and neck supple.   Skin:     General: Skin is warm and dry.   Neurological:      Mental Status: He is alert and oriented to person, place, and time.   Psychiatric:         Behavior: Behavior normal.       Urinalysis:  Normal   Bladder scan:  365 cc    Assessment/Plan:   Difficulty voiding/incomplete bladder emptying/frequency/urgency:  Patient's bladder scan is still elevated despite increasing Flomax.    Will start patient on Proscar.  Will schedule patient for cysto for further evaluation.      Follow-up to be arranged.  Problem List Items Addressed This Visit    None  Visit Diagnoses       Difficulty voiding    -  Primary    Relevant Orders    POCT Urinalysis(Instrument) (Completed)    POCT Bladder Scan    Cystoscopy    Incomplete bladder emptying        Relevant Medications    finasteride (PROSCAR) 5 mg tablet    Other Relevant Orders    POCT Bladder Scan    Cystoscopy    Urinary frequency        Urinary urgency

## 2024-02-03 ENCOUNTER — HOSPITAL ENCOUNTER (OUTPATIENT)
Dept: RADIOLOGY | Facility: HOSPITAL | Age: 49
Discharge: HOME | End: 2024-02-03
Attending: NEUROLOGICAL SURGERY
Payer: COMMERCIAL

## 2024-02-03 DIAGNOSIS — D49.6 BRAIN TUMOR: ICD-10-CM

## 2024-02-03 DIAGNOSIS — C71.9 OLIGODENDROGLIOMA: ICD-10-CM

## 2024-02-03 PROCEDURE — 25500020 PHARM REV CODE 255: Performed by: NEUROLOGICAL SURGERY

## 2024-02-03 PROCEDURE — 70553 MRI BRAIN STEM W/O & W/DYE: CPT | Mod: 26,,, | Performed by: RADIOLOGY

## 2024-02-03 PROCEDURE — A9585 GADOBUTROL INJECTION: HCPCS | Performed by: NEUROLOGICAL SURGERY

## 2024-02-03 PROCEDURE — 70553 MRI BRAIN STEM W/O & W/DYE: CPT | Mod: TC

## 2024-02-03 RX ORDER — GADOBUTROL 604.72 MG/ML
10 INJECTION INTRAVENOUS
Status: COMPLETED | OUTPATIENT
Start: 2024-02-03 | End: 2024-02-03

## 2024-02-03 RX ADMIN — GADOBUTROL 10 ML: 604.72 INJECTION INTRAVENOUS at 08:02

## 2024-02-06 ENCOUNTER — OFFICE VISIT (OUTPATIENT)
Dept: NEUROSURGERY | Facility: CLINIC | Age: 49
End: 2024-02-06
Payer: COMMERCIAL

## 2024-02-06 DIAGNOSIS — D49.6 BRAIN TUMOR: ICD-10-CM

## 2024-02-06 DIAGNOSIS — C71.9 OLIGODENDROGLIOMA: Primary | ICD-10-CM

## 2024-02-06 PROCEDURE — 99214 OFFICE O/P EST MOD 30 MIN: CPT | Mod: 95,,, | Performed by: NEUROLOGICAL SURGERY

## 2024-02-06 PROCEDURE — 1159F MED LIST DOCD IN RCRD: CPT | Mod: CPTII,95,, | Performed by: NEUROLOGICAL SURGERY

## 2024-02-06 PROCEDURE — 1160F RVW MEDS BY RX/DR IN RCRD: CPT | Mod: CPTII,95,, | Performed by: NEUROLOGICAL SURGERY

## 2024-02-06 NOTE — PATIENT INSTRUCTIONS
I have personally reviewed the MRI brain (tumor with perfusion) with the pt which shows evolving operative change status post left posterior frontal lesion resection.  Evolving edema signal abnormality along the margin the resection cavity overall slightly reduced from prior. Please note there are small regions of nodular enhancement along the margin resection cavity likely represents evolving postoperative hemorrhage and subacute aged infarction.  No new edema signal abnormality or elevated relative cerebral blood volume to suggest definite worsening residual or high-grade neoplasm.    I will schedule the patient for 2 month follow up with MRI brain with perfusion.

## 2024-02-06 NOTE — PROGRESS NOTES
The patient location is: [place]  The chief complaint leading to consultation is: follow up    Visit type: audiovisual    Face to Face time with patient: 10 minutes  20 minutes of total time spent on the encounter, which includes face to face time and non-face to face time preparing to see the patient (eg, review of tests), Obtaining and/or reviewing separately obtained history, Documenting clinical information in the electronic or other health record, Independently interpreting results (not separately reported) and communicating results to the patient/family/caregiver, or Care coordination (not separately reported).         Each patient to whom he or she provides medical services by telemedicine is:  (1) informed of the relationship between the physician and patient and the respective role of any other health care provider with respect to management of the patient; and (2) notified that he or she may decline to receive medical services by telemedicine and may withdraw from such care at any time.    Notes:    Subjective:   Bonnie TRAN, attest that this documentation has been prepared under the direction and in the presence of Wellington Park MD.     Patient ID: Marvin Pool is a 48 y.o. male     Chief Complaint: No chief complaint on file.      HPI  Mr. Marvin Pool is a 48 y.o. 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 At the time of our last visit on 1/16/2024, the pt reports an acute speech deficit. Endorses intermittent word finding difficulty. Symptoms concerning for disease progression, therefore MRI perfusion protocol was ordered. He is here today for imaging review, which shows decreased cerebral blood volume to suggest definite worsening residual or high-grade neoplasm. Pt denies headaches, seizures, extremity weakness, and all other neurologic complaints.    Review of  Systems   Constitutional:  Negative for activity change, appetite change, fatigue, fever and unexpected weight change.   HENT:  Negative for facial swelling.    Eyes: Negative.    Respiratory: Negative.     Cardiovascular: Negative.    Gastrointestinal:  Negative for diarrhea, nausea and vomiting.   Endocrine: Negative.    Genitourinary: Negative.    Musculoskeletal:  Negative for back pain, joint swelling, myalgias and neck pain.   Neurological:  Negative for dizziness, seizures, weakness, numbness and headaches.   Psychiatric/Behavioral: Negative.          Past Medical History:   Diagnosis Date    Brain tumor 1/12/2023    Seizures 1/12/2023       Objective:    There were no vitals filed for this visit.   Physical Exam  Constitutional:       General: He is not in acute distress.     Appearance: Normal appearance.   HENT:      Head: Normocephalic and atraumatic.   Neurological:      Mental Status: He is alert and oriented to person, place, and time.          IMAGING:  MRI Brain (Tumor with Perfusion) (2/3/2024):  Evolving operative change status post left posterior frontal lesion resection.  Evolving edema signal abnormality along the margin the resection cavity overall slightly reduced from prior. Please note there are small regions of nodular enhancement along the margin resection cavity likely represents evolving postoperative hemorrhage and subacute aged infarction.  No new edema signal abnormality or elevated relative cerebral blood volume to suggest definite worsening residual or high-grade neoplasm.      I have personally reviewed the images with the pt.      I, Dr. Wellington Park, personally performed the services described in this documentation. All medical record entries made by the scribe, Bonnie Ly, were at my direction and in my presence.  I have reviewed the chart and agree that the record reflects my personal performance and is accurate and complete. Wellington Park MD. 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  Evolving edema signal abnormality along the margin the resection cavity overall slightly reduced from prior. Please note there are small regions of nodular enhancement along the margin resection cavity likely represents evolving postoperative hemorrhage and subacute aged infarction.  No new edema signal abnormality or elevated relative cerebral blood volume to suggest definite worsening residual or high-grade neoplasm.    I will schedule the patient for 2 month follow up with MRI brain with perfusion.

## 2024-02-07 ENCOUNTER — TELEPHONE (OUTPATIENT)
Dept: UROLOGY | Facility: CLINIC | Age: 49
End: 2024-02-07
Payer: COMMERCIAL

## 2024-02-07 DIAGNOSIS — C71.1 MALIGNANT NEOPLASM OF FRONTAL LOBE: Chronic | ICD-10-CM

## 2024-02-07 NOTE — PATIENT INSTRUCTIONS
Patient Education       Cystoscopy Discharge Instructions   About this topic   Your kidneys make urine. It is stored in your bladder. The urethra is a tube at the bottom of the bladder. Urine flows out of this tube. Sometimes, there is a blockage and urine is not able to leave the body.  A cystoscopy is a procedure that lets the doctor see the inside of your bladder and urethra. The doctor does it to:  Look for stones or tumors blocking the bladder and urethra  Look for changes or injury inside the bladder  Take a tissue sample from the inside of your bladder  Look for reasons for blood in the urine, pain with urination, or why you are passing urine often  Look for prostate problems     What care is needed at home?   Ask your doctor what you need to do when you go home. Make sure you ask questions if you do not understand what the doctor says. This way you will know what you need to do.  Take a warm bath or use a warm wet washcloth over the opening to the urethra. This will help to ease any pain. Do this as needed.  Drink 6 to 8 glasses of water a day and 3 to 4 glasses in the first few hours after the procedure to flush out your bladder and reduce irritation.  You may see some blood in your urine for a few days. This is normal.  Empty your bladder as soon as you feel the need to. Don't delay going to the bathroom. It stretches and weakens the bladder.  What follow-up care is needed?   Your doctor may ask you to make visits to the office to check on your progress. Be sure to keep these visits.  If you had a biopsy, talk with your doctor about the results.  What drugs may be needed?   The doctor may order drugs to:  Help with pain  Fight an infection  Help with bladder spasms  Will physical activity be limited?   Talk to your doctor about when you may go back to your normal activities like work, driving, or sex.  What problems could happen?   Bleeding  Infection  Injury to the bladder and urethra  Discomfort in the  urethra area  Burning sensation for a short time  Upset stomach  When do I need to call the doctor?   Signs of infection. These include a fever of 100.4°F (38°C) or higher, chills, pain with passing urine.  Pain that does not go away even with drugs or that lasts longer than 2 days  Too much blood in your urine  Passing large dime-sized clots  Cloudy urine  Little or no urine or not able to pass urine  Abdominal pain and nausea  Teach Back: Helping You Understand   The Teach Back Method helps you understand the information we are giving you. After you talk with the staff, tell them in your own words what you learned. This helps to make sure the staff has described each thing clearly. It also helps to explain things that may have been confusing. Before going home, make sure you can do these:  I can tell you about my procedure.  I can tell you what may help ease my pain.  I can tell you what I will do if I have a fever, chills, or am not able to pass urine.  Where can I learn more?   [employer] Society  https://www.cancer.org/treatment/understanding-your-diagnosis/tests/endoscopy/cystoscopy.html   [employer]  https://www.cancerresearchuk.org/about-cancer/bladder-cancer/getting-diagnosed/tests-diagnose/cystoscopy   NHS Choices  http://www.nhs.uk/conditions/Cystoscopy/Pages/Introduction.aspx   Last Reviewed Date   2021-04-22  Consumer Information Use and Disclaimer   This information is not specific medical advice and does not replace information you receive from your health care provider. This is only a brief summary of general information. It does NOT include all information about conditions, illnesses, injuries, tests, procedures, treatments, therapies, discharge instructions or life-style choices that may apply to you. You must talk with your health care provider for complete information about your health and treatment options. This information should not be used to decide whether or not to accept your  health care providers advice, instructions or recommendations. Only your health care provider has the knowledge and training to provide advice that is right for you.  Copyright   Copyright © 2021 [employer] and its affiliates and/or licensors. All rights reserved.

## 2024-02-08 ENCOUNTER — PROCEDURE VISIT (OUTPATIENT)
Dept: UROLOGY | Facility: CLINIC | Age: 49
End: 2024-02-08
Payer: COMMERCIAL

## 2024-02-08 VITALS
DIASTOLIC BLOOD PRESSURE: 66 MMHG | HEART RATE: 71 BPM | SYSTOLIC BLOOD PRESSURE: 121 MMHG | RESPIRATION RATE: 17 BRPM | BODY MASS INDEX: 24.51 KG/M2 | WEIGHT: 170.81 LBS | OXYGEN SATURATION: 97 %

## 2024-02-08 DIAGNOSIS — R39.198 DIFFICULTY VOIDING: ICD-10-CM

## 2024-02-08 DIAGNOSIS — R33.9 INCOMPLETE BLADDER EMPTYING: ICD-10-CM

## 2024-02-08 PROCEDURE — 52000 CYSTOURETHROSCOPY: CPT | Mod: S$GLB,,, | Performed by: UROLOGY

## 2024-02-08 RX ORDER — SULFAMETHOXAZOLE AND TRIMETHOPRIM 800; 160 MG/1; MG/1
1 TABLET ORAL DAILY
Qty: 30 TABLET | Refills: 0 | Status: SHIPPED | OUTPATIENT
Start: 2024-02-08 | End: 2024-03-14

## 2024-02-08 NOTE — PROCEDURES
Cystoscopy    Date/Time: 2/8/2024 10:30 AM    Performed by: Jaspreet Garay MD  Authorized by: Miguel Villalta NP    Timeout: prior to procedure the correct patient, procedure, and site was verified    Prep: patient was prepped and draped in usual sterile fashion    Anesthesia:  Intraurethral instillation  Indications: hematuria    Position:  Supine  Anesthesia:  Intraurethral instillation  Patient sedated?: No    Preparation: Patient was prepped and draped in usual sterile fashion    Scope type:  Flexible cystoscope  External exam normal: Yes    Urethra normal: Yes    Prostate normal: Yes    Comments:      Patient was brought to the procedure room placed on the table padded prepped and draped in usual sterile fashion in supine position. The cystoscope was inserted into the urethra and advanced the urethra was normal prostate showed expected enlargement for patient's age, the bladder is entered and inspected is found to be free of tumor stone or foreign body.  Bilateral ureteral orifices were identified and noted to be normal in appearance with clear efflux of urine at this point the scope was removed the patient tolerated the procedure well there were no complications

## 2024-02-19 ENCOUNTER — PATIENT MESSAGE (OUTPATIENT)
Dept: HEMATOLOGY/ONCOLOGY | Facility: CLINIC | Age: 49
End: 2024-02-19

## 2024-02-19 DIAGNOSIS — C71.9 GLIOMA: Primary | ICD-10-CM

## 2024-02-19 LAB
ALBUMIN SERPL BCP-MCNC: 4.2 G/DL (ref 3.4–5)
ALBUMIN/GLOBULIN RATIO: 1.24 RATIO (ref 1.1–1.8)
ALP SERPL-CCNC: 75 U/L (ref 46–116)
ALT SERPL W P-5'-P-CCNC: 31 U/L (ref 12–78)
ANION GAP SERPL CALC-SCNC: 9 MMOL/L (ref 3–11)
AST SERPL-CCNC: 18 U/L (ref 15–37)
BANDS: 1 % (ref 0–5)
BILIRUB SERPL-MCNC: 0.5 MG/DL (ref 0–1)
BUN SERPL-MCNC: 11 MG/DL (ref 7–18)
BUN/CREAT SERPL: 9.16 RATIO (ref 7–18)
CALCIUM SERPL-MCNC: 9 MG/DL (ref 8.8–10.5)
CELLS COUNTED: 100
CHLORIDE SERPL-SCNC: 100 MMOL/L (ref 100–108)
CO2 SERPL-SCNC: 28 MMOL/L (ref 21–32)
CREAT SERPL-MCNC: 1.2 MG/DL (ref 0.7–1.3)
EOSINOPHIL NFR BLD: 4 % (ref 1–3)
ERYTHROCYTE [DISTWIDTH] IN BLOOD BY AUTOMATED COUNT: 12.9 % (ref 12.5–18)
GFR ESTIMATION: > 60
GLOBULIN: 3.4 G/DL (ref 2.3–3.5)
GLUCOSE SERPL-MCNC: 121 MG/DL (ref 70–110)
HCT VFR BLD AUTO: 38.7 % (ref 42–52)
HGB BLD-MCNC: 13.8 G/DL (ref 14–18)
LYMPHOCYTES NFR BLD: 4 % (ref 25–40)
MCH RBC QN AUTO: 29.1 PG (ref 27–31.2)
MCHC RBC AUTO-ENTMCNC: 35.7 G/DL (ref 31.8–35.4)
MCV RBC AUTO: 81.6 FL (ref 80–97)
MONOCYTES NFR BLD: 10 % (ref 1–15)
NEUTROPHILS # BLD AUTO: 6.7 10*3/UL (ref 1.8–7.7)
NEUTROPHILS NFR BLD: 81 % (ref 37–80)
NUCLEATED RED BLOOD CELLS: 0 %
PLATELETS: 291 10*3/UL (ref 142–424)
POTASSIUM SERPL-SCNC: 3.5 MMOL/L (ref 3.6–5.2)
PROT SERPL-MCNC: 7.6 G/DL (ref 6.4–8.2)
RBC # BLD AUTO: 4.74 10*6/UL (ref 4.7–6.1)
RBC MORPH BLD: ABNORMAL
SMALL PLATELETS BLD QL SMEAR: ADEQUATE
SODIUM BLD-SCNC: 137 MMOL/L (ref 135–145)
WBC # BLD: 8.2 10*3/UL (ref 4.6–10.2)

## 2024-02-20 ENCOUNTER — OFFICE VISIT (OUTPATIENT)
Dept: HEMATOLOGY/ONCOLOGY | Facility: CLINIC | Age: 49
End: 2024-02-20
Payer: COMMERCIAL

## 2024-02-20 VITALS
SYSTOLIC BLOOD PRESSURE: 117 MMHG | BODY MASS INDEX: 24.37 KG/M2 | DIASTOLIC BLOOD PRESSURE: 74 MMHG | RESPIRATION RATE: 18 BRPM | HEART RATE: 96 BPM | OXYGEN SATURATION: 97 % | WEIGHT: 170.19 LBS | HEIGHT: 70 IN

## 2024-02-20 DIAGNOSIS — C71.9 GLIOMA: Primary | ICD-10-CM

## 2024-02-20 DIAGNOSIS — D63.0 ANEMIA IN NEOPLASTIC DISEASE: Primary | ICD-10-CM

## 2024-02-20 DIAGNOSIS — C71.9 GLIOMA: ICD-10-CM

## 2024-02-20 DIAGNOSIS — R09.81 NASAL CONGESTION: ICD-10-CM

## 2024-02-20 PROCEDURE — 3008F BODY MASS INDEX DOCD: CPT | Mod: CPTII,S$GLB,, | Performed by: NURSE PRACTITIONER

## 2024-02-20 PROCEDURE — 99214 OFFICE O/P EST MOD 30 MIN: CPT | Mod: S$GLB,,, | Performed by: NURSE PRACTITIONER

## 2024-02-20 PROCEDURE — 3074F SYST BP LT 130 MM HG: CPT | Mod: CPTII,S$GLB,, | Performed by: NURSE PRACTITIONER

## 2024-02-20 PROCEDURE — 1159F MED LIST DOCD IN RCRD: CPT | Mod: CPTII,S$GLB,, | Performed by: NURSE PRACTITIONER

## 2024-02-20 PROCEDURE — 3078F DIAST BP <80 MM HG: CPT | Mod: CPTII,S$GLB,, | Performed by: NURSE PRACTITIONER

## 2024-02-20 RX ORDER — LEVETIRACETAM 500 MG/1
500 TABLET ORAL 2 TIMES DAILY
Qty: 60 TABLET | Refills: 11 | Status: SHIPPED | OUTPATIENT
Start: 2024-02-20 | End: 2025-02-19

## 2024-02-20 RX ORDER — TEMOZOLOMIDE 250 MG/1
CAPSULE ORAL
Qty: 5 CAPSULE | Refills: 6 | Status: SHIPPED | OUTPATIENT
Start: 2024-02-20 | End: 2024-03-06 | Stop reason: SDUPTHER

## 2024-02-20 RX ORDER — TEMOZOLOMIDE 140 MG/1
CAPSULE ORAL
Qty: 5 CAPSULE | Refills: 6 | Status: SHIPPED | OUTPATIENT
Start: 2024-02-20 | End: 2024-03-06 | Stop reason: SDUPTHER

## 2024-02-20 NOTE — PROGRESS NOTES
MEDICAL ONCOLOGY FOLLOW UP CONSULTATION NOTE    Patient ID: Marvin Pool is a 48 y.o. male.    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  He also completed chemo XRT and now is currently on adjuvant Temodar.  He would like to establish care here locally in [place] and voices no acute complaints reporting that he is tolerated Temodar without any side effects at this time.  He presents to our clinic today for further evaluation        Imaging:       MRI brain:  01/12/23    Expected immediate postsurgical changes of left frontal craniotomy for frontal mass resection.     Persistent area of T2 FLAIR hyperintense signal surrounding the resection is similar size compared to preoperative MRI. Typical thin peripheral enhancement around the resection cavity.        Past Medical History:   Diagnosis Date    Brain tumor 1/12/2023    Seizures 1/12/2023     Family History   Problem Relation Age of Onset    No Known Problems Mother     No Known Problems Father     No Known Problems Sister     No Known Problems Brother     No Known Problems Maternal Aunt      Social History     Socioeconomic History    Marital status:    Tobacco Use    Smoking status: Never    Smokeless tobacco: Never   Substance and Sexual Activity    Alcohol use: Not Currently     Social Determinants of Health     Financial Resource Strain: Low Risk  (11/16/2023)    Overall Financial Resource Strain (CARDIA)     Difficulty of Paying Living Expenses: Not very hard   Food Insecurity: No Food Insecurity (11/16/2023)    Hunger Vital Sign     Worried About Running Out of Food in the Last Year: Never true     Ran Out of Food in the Last Year: Never true   Transportation Needs: No Transportation Needs (11/16/2023)    PRAPARE - Transportation     Lack of Transportation (Medical): No      Lack of Transportation (Non-Medical): No   Physical Activity: Sufficiently Active (11/16/2023)    Exercise Vital Sign     Days of Exercise per Week: 6 days     Minutes of Exercise per Session: 120 min   Stress: Stress Concern Present (11/16/2023)    [employer] - Occupational Stress Questionnaire     Feeling of Stress : Rather much   Social Connections: Unknown (11/16/2023)    Social Connection and Isolation Panel [NHANES]     Frequency of Communication with Friends and Family: More than three times a week     Frequency of Social Gatherings with Friends and Family: Three times a week     Active Member of Clubs or Organizations: No     Attends Club or Organization Meetings: Never     Marital Status:    Housing Stability: High Risk (11/16/2023)    Housing Stability Vital Sign     Unable to Pay for Housing in the Last Year: Yes     Number of Places Lived in the Last Year: 1     Unstable Housing in the Last Year: No     Past Surgical History:   Procedure Laterality Date    CRANIOTOMY USING FRAMELESS STEREOTAXY Left 1/12/2023    Procedure: CRANIOTOMY, USING FRAMELESS STEREOTAXY;  Surgeon: Wellington Park MD;  Location: [address];  Service: Neurosurgery;  Laterality: Left;         Review of systems:  Review of Systems   Constitutional:  Negative for activity change, appetite change, chills, diaphoresis, fatigue and unexpected weight change.   HENT:  Negative for congestion, facial swelling, hearing loss, mouth sores, trouble swallowing and voice change.    Eyes:  Negative for photophobia, pain, discharge and itching.   Respiratory:  Negative for apnea, cough, choking, chest tightness and shortness of breath.    Cardiovascular:  Negative for chest pain, palpitations and leg swelling.   Gastrointestinal:  Negative for abdominal distention, abdominal pain, anal bleeding and blood in stool.   Endocrine: Negative for cold intolerance, heat intolerance, polydipsia and polyphagia.    Genitourinary:  Negative for difficulty urinating, dysuria, flank pain and hematuria.   Musculoskeletal:  Negative for arthralgias, back pain, joint swelling, myalgias, neck pain and neck stiffness.   Skin:  Negative for color change, pallor and wound.   Allergic/Immunologic: Negative for environmental allergies, food allergies and immunocompromised state.   Neurological:  Positive for speech difficulty and headaches. Negative for dizziness, seizures, facial asymmetry, light-headedness and numbness.   Hematological:  Negative for adenopathy. Does not bruise/bleed easily.   Psychiatric/Behavioral:  Negative for agitation, behavioral problems, confusion, decreased concentration and sleep disturbance.            Physical Exam  Vitals and nursing note reviewed.   Constitutional:       General: He is not in acute distress.     Appearance: Normal appearance. He is not ill-appearing.   HENT:      Head: Normocephalic and atraumatic.      Nose: No congestion or rhinorrhea.   Eyes:      General: No scleral icterus.     Extraocular Movements: Extraocular movements intact.      Pupils: Pupils are equal, round, and reactive to light.   Cardiovascular:      Rate and Rhythm: Normal rate and regular rhythm.      Pulses: Normal pulses.      Heart sounds: Normal heart sounds. No murmur heard.     No gallop.   Pulmonary:      Effort: Pulmonary effort is normal. No respiratory distress.      Breath sounds: Normal breath sounds. No stridor. No wheezing or rhonchi.   Abdominal:      General: Bowel sounds are normal. There is no distension.      Palpations: There is no mass.      Tenderness: There is no abdominal tenderness. There is no guarding.   Musculoskeletal:         General: No swelling, tenderness, deformity or signs of injury. Normal range of motion.      Cervical back: Normal range of motion and neck supple. No rigidity. No muscular tenderness.      Right lower leg: No edema.      Left lower leg: No edema.   Skin:     General:  Skin is warm.      Coloration: Skin is not jaundiced or pale.      Findings: No bruising or lesion.   Neurological:      General: No focal deficit present.      Mental Status: He is alert and oriented to person, place, and time.      Cranial Nerves: No cranial nerve deficit.      Sensory: No sensory deficit.      Motor: No weakness.      Gait: Gait normal.   Psychiatric:         Mood and Affect: Mood normal.         Behavior: Behavior normal.         Thought Content: Thought content normal.               There were no vitals filed for this visit.      There is no height or weight on file to calculate BSA.    Assessment/Plan:      ECOG 1    Glioma :    == Grade 2 glioma of the left frontal lobe status post subtotal resection in January of 2023.   == s/p Chemo-XRT completed recently and now on adjuvant Temodar tolerating well.  == I will add Bactrim DS for PCP prohylaxis at this time and see patient prior to C#2  == 7/10/23: Labs reviewed. Tolerating Bactrim DS well with no dose limiting toxicities. Voices no complaints. Next MRI brain in Aug 2023  ==8/07/23: First cycle of adjuvant Temozolomide well tolerated, patient has some mild nausea relieved with phenergan prn. He is not taking ondansetron states phenergan works better.  Temozolomide dosage is 390mg days 1-5 q 28 days.  Reports good compliance with bactrim for pcp prophylaxis. MRI Brain reviewed Next MRI brain is scheduled November 6th 2023.  No headaches, visual changes. Good compliance with Keppra, will check keppra level with labs.  ==09/05/23: Patient is s/p 2 cycles of adjuvant Temozolomide, has intermittent nausea relieved with phenergan.  No s/s infection, is on bactrim for pcp prophylaxis.  Plan to continue adjuvant Temozolomide 6-12 cycles with MRI brain on 11/6/23 has set up per radiation oncology. Keppra level not drawn, will add to orders  ==10/09/23: Patient doing well, recently saw neurosurgery with MRI brain which was noted to have continued  stable residual disease.  Temozolomide well tolerated, reports occasional nausea relieved with ondansetron.  Will continue with Temozolomide, patient is cleared to start cycle 4 with 6-12 planned adjuvant cycles. Next MRI brain scheduled by neurosurgery in 4 months  ==11/6/23: Patient due to start cycle 5 adjuvant Temozolomide.  Tolerating well.  ==12/20/23: MRI brain 12/4/23 shows stable disease with post radiation changes. Stable T2 hyperintense signal at the operative cavity which may be a combination of tumor and or gliosis.  ==01/17/2024: Pt due for cycle 7 adjuvant Temozolomide.  He reports some headaches, mild expressive aphasia and stuttering intermittent for about last month.  He has MRI scheduled on 2/26/2024 scheduled by Dr. Park, patient's neurosurgeon.  I sent a message to Dr. Park regarding MRI to see if he would like to move it up.   ==01/18/2024 Addendum: message received from Dr. Park, neurosurgeon.  He will have his office move up patient's MRI to be completed in [facility].  His office will contact patient  ==02/20/2024: Pt due for cycle 8 adjuvant temozolomide. He recently had MRI and appt with Dr. Park, neurosurgeon which showed no evidence of recurrence or progression.  He was also having some difficulty voiding and urinary retention, saw Dr. Garay who performed a cystoscope which was normal.      2. Anemia  ==mild, asymptomatic hgb 13.8    3. Nasal congestion  ==Intermittent, no sinus pain/pressure or postnasal drip  ==may use otc antihistamines, Flonase, simple saline nasal spray prn    Plan:  Continue Temodar for 6-12 planned adjuvant cycles  Continue Bactrim DS for PCP prophylaxis  RTC 4 weeks for MD visit with labs: CBC, CMP prior      Total time spent in counseling and discussion about further management options including relevant lab work, treatment,  prognosis, medications and intended side effects was more than 30 minutes. More than 50% of the time was spent on counseling and coordination  of care.  I spent a total of 30 minutes on the day of the visit.This includes face to face time and non-face to face time preparing to see the patient (eg, review of tests), Obtaining and/or reviewing separately obtained history, Documenting clinical information in the electronic or other health record, Independently interpreting resultsand communicating results to the patient/family/caregiver, or Care coordination.

## 2024-02-29 ENCOUNTER — PATIENT MESSAGE (OUTPATIENT)
Dept: HEMATOLOGY/ONCOLOGY | Facility: CLINIC | Age: 49
End: 2024-02-29
Payer: COMMERCIAL

## 2024-03-05 ENCOUNTER — PATIENT MESSAGE (OUTPATIENT)
Dept: HEMATOLOGY/ONCOLOGY | Facility: CLINIC | Age: 49
End: 2024-03-05
Payer: COMMERCIAL

## 2024-03-06 DIAGNOSIS — C71.9 GLIOMA: ICD-10-CM

## 2024-03-06 RX ORDER — TEMOZOLOMIDE 140 MG/1
CAPSULE ORAL
Qty: 5 CAPSULE | Refills: 6 | Status: SHIPPED | OUTPATIENT
Start: 2024-03-06 | End: 2024-03-07 | Stop reason: SDUPTHER

## 2024-03-06 RX ORDER — TEMOZOLOMIDE 250 MG/1
CAPSULE ORAL
Qty: 5 CAPSULE | Refills: 6 | Status: SHIPPED | OUTPATIENT
Start: 2024-03-06 | End: 2024-03-07 | Stop reason: SDUPTHER

## 2024-03-07 ENCOUNTER — TELEPHONE (OUTPATIENT)
Dept: HEMATOLOGY/ONCOLOGY | Facility: CLINIC | Age: 49
End: 2024-03-07
Payer: COMMERCIAL

## 2024-03-07 DIAGNOSIS — C71.9 GLIOMA: ICD-10-CM

## 2024-03-07 RX ORDER — TEMOZOLOMIDE 140 MG/1
CAPSULE ORAL
Qty: 5 CAPSULE | Refills: 6 | Status: SHIPPED | OUTPATIENT
Start: 2024-03-07 | End: 2024-03-18 | Stop reason: SDUPTHER

## 2024-03-07 RX ORDER — TEMOZOLOMIDE 250 MG/1
CAPSULE ORAL
Qty: 5 CAPSULE | Refills: 6 | Status: SHIPPED | OUTPATIENT
Start: 2024-03-07 | End: 2024-03-18 | Stop reason: SDUPTHER

## 2024-03-14 DIAGNOSIS — C71.1 MALIGNANT NEOPLASM OF FRONTAL LOBE: Chronic | ICD-10-CM

## 2024-03-14 RX ORDER — SULFAMETHOXAZOLE AND TRIMETHOPRIM 800; 160 MG/1; MG/1
1 TABLET ORAL DAILY
Qty: 30 TABLET | Refills: 0 | Status: SHIPPED | OUTPATIENT
Start: 2024-03-14 | End: 2024-04-18

## 2024-03-18 DIAGNOSIS — C71.9 GLIOMA: ICD-10-CM

## 2024-03-18 LAB
ALBUMIN SERPL BCP-MCNC: 3.8 G/DL (ref 3.4–5)
ALBUMIN/GLOBULIN RATIO: 1.15 RATIO (ref 1.1–1.8)
ALP SERPL-CCNC: 72 U/L (ref 46–116)
ALT SERPL W P-5'-P-CCNC: 33 U/L (ref 12–78)
ANION GAP SERPL CALC-SCNC: 10 MMOL/L (ref 3–11)
AST SERPL-CCNC: 18 U/L (ref 15–37)
BASOPHILS NFR BLD: 1.3 % (ref 0–3)
BILIRUB SERPL-MCNC: 0.4 MG/DL (ref 0–1)
BUN SERPL-MCNC: 13 MG/DL (ref 7–18)
BUN/CREAT SERPL: 11.5 RATIO (ref 7–18)
CALCIUM SERPL-MCNC: 8.6 MG/DL (ref 8.8–10.5)
CHLORIDE SERPL-SCNC: 103 MMOL/L (ref 100–108)
CO2 SERPL-SCNC: 25 MMOL/L (ref 21–32)
CREAT SERPL-MCNC: 1.13 MG/DL (ref 0.7–1.3)
EOSINOPHIL NFR BLD: 3.5 % (ref 1–3)
ERYTHROCYTE [DISTWIDTH] IN BLOOD BY AUTOMATED COUNT: 13.1 % (ref 12.5–18)
GFR ESTIMATION: > 60
GLOBULIN: 3.3 G/DL (ref 2.3–3.5)
GLUCOSE SERPL-MCNC: 99 MG/DL (ref 70–110)
HCT VFR BLD AUTO: 38.2 % (ref 42–52)
HGB BLD-MCNC: 13.2 G/DL (ref 14–18)
LYMPHOCYTES NFR BLD: 14.1 % (ref 25–40)
MCH RBC QN AUTO: 28.4 PG (ref 27–31.2)
MCHC RBC AUTO-ENTMCNC: 34.6 G/DL (ref 31.8–35.4)
MCV RBC AUTO: 82.2 FL (ref 80–97)
MONOCYTES NFR BLD: 12.1 % (ref 1–15)
NEUTROPHILS # BLD AUTO: 3.17 10*3/UL (ref 1.8–7.7)
NEUTROPHILS NFR BLD: 68.8 % (ref 37–80)
NUCLEATED RED BLOOD CELLS: 0 %
PLATELETS: 294 10*3/UL (ref 142–424)
POTASSIUM SERPL-SCNC: 4 MMOL/L (ref 3.6–5.2)
PROT SERPL-MCNC: 7.1 G/DL (ref 6.4–8.2)
RBC # BLD AUTO: 4.65 10*6/UL (ref 4.7–6.1)
SODIUM BLD-SCNC: 138 MMOL/L (ref 135–145)
WBC # BLD: 4.6 10*3/UL (ref 4.6–10.2)

## 2024-03-18 RX ORDER — TEMOZOLOMIDE 140 MG/1
CAPSULE ORAL
Qty: 5 CAPSULE | Refills: 6 | Status: SHIPPED | OUTPATIENT
Start: 2024-03-18 | End: 2024-03-18 | Stop reason: SDUPTHER

## 2024-03-18 RX ORDER — TEMOZOLOMIDE 250 MG/1
CAPSULE ORAL
Qty: 5 CAPSULE | Refills: 6 | Status: SHIPPED | OUTPATIENT
Start: 2024-03-18 | End: 2024-03-18 | Stop reason: SDUPTHER

## 2024-03-18 RX ORDER — TEMOZOLOMIDE 250 MG/1
CAPSULE ORAL
Qty: 5 CAPSULE | Refills: 6 | Status: SHIPPED | OUTPATIENT
Start: 2024-03-18

## 2024-03-18 RX ORDER — TEMOZOLOMIDE 140 MG/1
CAPSULE ORAL
Qty: 5 CAPSULE | Refills: 6 | Status: SHIPPED | OUTPATIENT
Start: 2024-03-18

## 2024-03-21 ENCOUNTER — OFFICE VISIT (OUTPATIENT)
Dept: HEMATOLOGY/ONCOLOGY | Facility: CLINIC | Age: 49
End: 2024-03-21
Payer: COMMERCIAL

## 2024-03-21 VITALS
DIASTOLIC BLOOD PRESSURE: 79 MMHG | RESPIRATION RATE: 18 BRPM | HEIGHT: 70 IN | BODY MASS INDEX: 24.46 KG/M2 | OXYGEN SATURATION: 97 % | WEIGHT: 170.88 LBS | HEART RATE: 78 BPM | SYSTOLIC BLOOD PRESSURE: 115 MMHG

## 2024-03-21 DIAGNOSIS — C71.9 GLIOMA: Primary | ICD-10-CM

## 2024-03-21 PROCEDURE — 1159F MED LIST DOCD IN RCRD: CPT | Mod: CPTII,S$GLB,, | Performed by: INTERNAL MEDICINE

## 2024-03-21 PROCEDURE — 99214 OFFICE O/P EST MOD 30 MIN: CPT | Mod: S$GLB,,, | Performed by: INTERNAL MEDICINE

## 2024-03-21 PROCEDURE — 3008F BODY MASS INDEX DOCD: CPT | Mod: CPTII,S$GLB,, | Performed by: INTERNAL MEDICINE

## 2024-03-21 PROCEDURE — 3074F SYST BP LT 130 MM HG: CPT | Mod: CPTII,S$GLB,, | Performed by: INTERNAL MEDICINE

## 2024-03-21 PROCEDURE — 3078F DIAST BP <80 MM HG: CPT | Mod: CPTII,S$GLB,, | Performed by: INTERNAL MEDICINE

## 2024-03-21 NOTE — PROGRESS NOTES
MEDICAL ONCOLOGY FOLLOW UP CONSULTATION NOTE    Patient ID: Marvin Pool is a 48 y.o. male.    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  He also completed chemo XRT and now is currently on adjuvant Temodar.  He would like to establish care here locally in [place] and voices no acute complaints reporting that he is tolerated Temodar without any side effects at this time.  He presents to our clinic today for further evaluation        Imaging:       MRI brain:  01/12/23    Expected immediate postsurgical changes of left frontal craniotomy for frontal mass resection.     Persistent area of T2 FLAIR hyperintense signal surrounding the resection is similar size compared to preoperative MRI. Typical thin peripheral enhancement around the resection cavity.        Past Medical History:   Diagnosis Date    Brain tumor 1/12/2023    Seizures 1/12/2023     Family History   Problem Relation Age of Onset    No Known Problems Mother     No Known Problems Father     No Known Problems Sister     No Known Problems Brother     No Known Problems Maternal Aunt      Social History     Socioeconomic History    Marital status:    Tobacco Use    Smoking status: Never    Smokeless tobacco: Never   Substance and Sexual Activity    Alcohol use: Not Currently     Social Determinants of Health     Financial Resource Strain: Low Risk  (11/16/2023)    Overall Financial Resource Strain (CARDIA)     Difficulty of Paying Living Expenses: Not very hard   Food Insecurity: No Food Insecurity (11/16/2023)    Hunger Vital Sign     Worried About Running Out of Food in the Last Year: Never true     Ran Out of Food in the Last Year: Never true   Transportation Needs: No Transportation Needs (11/16/2023)    PRAPARE - Transportation     Lack of Transportation (Medical): No      Lack of Transportation (Non-Medical): No   Physical Activity: Sufficiently Active (11/16/2023)    Exercise Vital Sign     Days of Exercise per Week: 6 days     Minutes of Exercise per Session: 120 min   Stress: Stress Concern Present (11/16/2023)    [employer] [employer] - Occupational Stress Questionnaire     Feeling of Stress : Rather much   Social Connections: Unknown (11/16/2023)    Social Connection and Isolation Panel [NHANES]     Frequency of Communication with Friends and Family: More than three times a week     Frequency of Social Gatherings with Friends and Family: Three times a week     Active Member of Clubs or Organizations: No     Attends Club or Organization Meetings: Never     Marital Status:    Housing Stability: High Risk (11/16/2023)    Housing Stability Vital Sign     Unable to Pay for Housing in the Last Year: Yes     Number of Places Lived in the Last Year: 1     Unstable Housing in the Last Year: No     Past Surgical History:   Procedure Laterality Date    CRANIOTOMY USING FRAMELESS STEREOTAXY Left 1/12/2023    Procedure: CRANIOTOMY, USING FRAMELESS STEREOTAXY;  Surgeon: Wellington Park MD;  Location: [address];  Service: Neurosurgery;  Laterality: Left;         Review of systems:  Review of Systems   Constitutional:  Negative for activity change, appetite change, chills, diaphoresis, fatigue and unexpected weight change.   HENT:  Negative for congestion, facial swelling, hearing loss, mouth sores, trouble swallowing and voice change.    Eyes:  Negative for photophobia, pain, discharge and itching.   Respiratory:  Negative for apnea, cough, choking, chest tightness and shortness of breath.    Cardiovascular:  Negative for chest pain, palpitations and leg swelling.   Gastrointestinal:  Negative for abdominal distention, abdominal pain, anal bleeding and blood in stool.   Endocrine: Negative for cold intolerance, heat intolerance, polydipsia and polyphagia.    Genitourinary:  Negative for difficulty urinating, dysuria, flank pain and hematuria.   Musculoskeletal:  Negative for arthralgias, back pain, joint swelling, myalgias, neck pain and neck stiffness.   Skin:  Negative for color change, pallor and wound.   Allergic/Immunologic: Negative for environmental allergies, food allergies and immunocompromised state.   Neurological:  Positive for speech difficulty and headaches. Negative for dizziness, seizures, facial asymmetry, light-headedness and numbness.   Hematological:  Negative for adenopathy. Does not bruise/bleed easily.   Psychiatric/Behavioral:  Negative for agitation, behavioral problems, confusion, decreased concentration and sleep disturbance.            Physical Exam  Vitals and nursing note reviewed.   Constitutional:       General: He is not in acute distress.     Appearance: Normal appearance. He is not ill-appearing.   HENT:      Head: Normocephalic and atraumatic.      Nose: No congestion or rhinorrhea.   Eyes:      General: No scleral icterus.     Extraocular Movements: Extraocular movements intact.      Pupils: Pupils are equal, round, and reactive to light.   Cardiovascular:      Rate and Rhythm: Normal rate and regular rhythm.      Pulses: Normal pulses.      Heart sounds: Normal heart sounds. No murmur heard.     No gallop.   Pulmonary:      Effort: Pulmonary effort is normal. No respiratory distress.      Breath sounds: Normal breath sounds. No stridor. No wheezing or rhonchi.   Abdominal:      General: Bowel sounds are normal. There is no distension.      Palpations: There is no mass.      Tenderness: There is no abdominal tenderness. There is no guarding.   Musculoskeletal:         General: No swelling, tenderness, deformity or signs of injury. Normal range of motion.      Cervical back: Normal range of motion and neck supple. No rigidity. No muscular tenderness.      Right lower leg: No edema.      Left lower leg: No edema.   Skin:     General:  Skin is warm.      Coloration: Skin is not jaundiced or pale.      Findings: No bruising or lesion.   Neurological:      General: No focal deficit present.      Mental Status: He is alert and oriented to person, place, and time.      Cranial Nerves: No cranial nerve deficit.      Sensory: No sensory deficit.      Motor: No weakness.      Gait: Gait normal.   Psychiatric:         Mood and Affect: Mood normal.         Behavior: Behavior normal.         Thought Content: Thought content normal.                 Vitals:    03/21/24 1105   BP: 115/79   Pulse: 78   Resp: 18         Body surface area is 1.96 meters squared.    Assessment/Plan:      ECOG 1    Glioma :    == Grade 2 glioma of the left frontal lobe status post subtotal resection in January of 2023.   == s/p Chemo-XRT completed recently and now on adjuvant Temodar tolerating well.  == I will add Bactrim DS for PCP prohylaxis at this time and see patient prior to C#2  == 7/10/23: Labs reviewed. Tolerating Bactrim DS well with no dose limiting toxicities. Voices no complaints. Next MRI brain in Aug 2023  ==8/07/23: First cycle of adjuvant Temozolomide well tolerated, patient has some mild nausea relieved with phenergan prn. He is not taking ondansetron states phenergan works better.  Temozolomide dosage is 390mg days 1-5 q 28 days.  Reports good compliance with bactrim for pcp prophylaxis. MRI Brain reviewed Next MRI brain is scheduled November 6th 2023.  No headaches, visual changes. Good compliance with Keppra, will check keppra level with labs.  ==09/05/23: Patient is s/p 2 cycles of adjuvant Temozolomide, has intermittent nausea relieved with phenergan.  No s/s infection, is on bactrim for pcp prophylaxis.  Plan to continue adjuvant Temozolomide 6-12 cycles with MRI brain on 11/6/23 has set up per radiation oncology. Keppra level not drawn, will add to orders  ==10/09/23: Patient doing well, recently saw neurosurgery with MRI brain which was noted to have  continued stable residual disease.  Temozolomide well tolerated, reports occasional nausea relieved with ondansetron.  Will continue with Temozolomide, patient is cleared to start cycle 4 with 6-12 planned adjuvant cycles. Next MRI brain scheduled by neurosurgery in 4 months  ==11/6/23: Patient due to start cycle 5 adjuvant Temozolomide.  Tolerating well.  ==12/20/23: MRI brain 12/4/23 shows stable disease with post radiation changes. Stable T2 hyperintense signal at the operative cavity which may be a combination of tumor and or gliosis.  ==01/17/2024: Pt due for cycle 7 adjuvant Temozolomide.  He reports some headaches, mild expressive aphasia and stuttering intermittent for about last month.  He has MRI scheduled on 2/26/2024 scheduled by Dr. Park, patient's neurosurgeon.  I sent a message to Dr. Park regarding MRI to see if he would like to move it up.   ==01/18/2024 Addendum: message received from Dr. Park, neurosurgeon.  He will have his office move up patient's MRI to be completed in [facility].  His office will contact patient  ==03/21/24: Pt is s/p cycle 8 adjuvant temozolomide. He recently had MRI and appt with Dr. Park, neurosurgeon which showed no evidence of recurrence or progression.  He was also having some difficulty voiding and urinary retention, saw Dr. Garay who performed a cystoscope which was normal. He has been unable to get Temodar for the last 2-3 weeks citing insurance and logistics issue. I will have my staff help him out in this regard.    2. Anemia  ==mild, asymptomatic hgb 13.8    3. Nasal congestion  ==Intermittent, no sinus pain/pressure or postnasal drip  ==may use otc antihistamines, Flonase, simple saline nasal spray prn    Plan:  Continue Temodar for 6-12 planned adjuvant cycles  Continue Bactrim DS for PCP prophylaxis  RTC 4 weeks for MD visit with labs: CBC, CMP prior      Total time spent in counseling and discussion about further management options including relevant lab work,  treatment,  prognosis, medications and intended side effects was more than 30 minutes. More than 50% of the time was spent on counseling and coordination of care.  I spent a total of 30 minutes on the day of the visit.This includes face to face time and non-face to face time preparing to see the patient (eg, review of tests), Obtaining and/or reviewing separately obtained history, Documenting clinical information in the electronic or other health record, Independently interpreting resultsand communicating results to the patient/family/caregiver, or Care coordination.

## 2024-04-13 ENCOUNTER — HOSPITAL ENCOUNTER (OUTPATIENT)
Dept: RADIOLOGY | Facility: HOSPITAL | Age: 49
Discharge: HOME | End: 2024-04-13
Attending: NEUROLOGICAL SURGERY
Payer: COMMERCIAL

## 2024-04-13 DIAGNOSIS — C71.9 OLIGODENDROGLIOMA: ICD-10-CM

## 2024-04-13 DIAGNOSIS — D49.6 BRAIN TUMOR: ICD-10-CM

## 2024-04-13 PROCEDURE — 25500020 PHARM REV CODE 255: Performed by: NEUROLOGICAL SURGERY

## 2024-04-13 PROCEDURE — 70553 MRI BRAIN STEM W/O & W/DYE: CPT | Mod: 26,,, | Performed by: RADIOLOGY

## 2024-04-13 PROCEDURE — A9585 GADOBUTROL INJECTION: HCPCS | Performed by: NEUROLOGICAL SURGERY

## 2024-04-13 PROCEDURE — 70553 MRI BRAIN STEM W/O & W/DYE: CPT | Mod: TC

## 2024-04-13 RX ORDER — GADOBUTROL 604.72 MG/ML
10 INJECTION INTRAVENOUS
Status: COMPLETED | OUTPATIENT
Start: 2024-04-13 | End: 2024-04-13

## 2024-04-13 RX ADMIN — GADOBUTROL 10 ML: 604.72 INJECTION INTRAVENOUS at 09:04

## 2024-04-16 ENCOUNTER — OFFICE VISIT (OUTPATIENT)
Dept: NEUROSURGERY | Facility: CLINIC | Age: 49
End: 2024-04-16
Payer: COMMERCIAL

## 2024-04-16 DIAGNOSIS — C71.9 OLIGODENDROGLIOMA: Primary | ICD-10-CM

## 2024-04-16 DIAGNOSIS — C71.1 MALIGNANT NEOPLASM OF FRONTAL LOBE: ICD-10-CM

## 2024-04-16 DIAGNOSIS — D49.6 BRAIN TUMOR: ICD-10-CM

## 2024-04-16 PROCEDURE — 1160F RVW MEDS BY RX/DR IN RCRD: CPT | Mod: CPTII,95,, | Performed by: NEUROLOGICAL SURGERY

## 2024-04-16 PROCEDURE — 99214 OFFICE O/P EST MOD 30 MIN: CPT | Mod: 95,,, | Performed by: NEUROLOGICAL SURGERY

## 2024-04-16 PROCEDURE — 1159F MED LIST DOCD IN RCRD: CPT | Mod: CPTII,95,, | Performed by: NEUROLOGICAL SURGERY

## 2024-04-16 NOTE — PROGRESS NOTES
The patient location is: [place]  The chief complaint leading to consultation is: follow up    Visit type: audiovisual    Face to Face time with patient: 10 minutes  30 minutes of total time spent on the encounter, which includes face to face time and non-face to face time preparing to see the patient (eg, review of tests), Obtaining and/or reviewing separately obtained history, Documenting clinical information in the electronic or other health record, Independently interpreting results (not separately reported) and communicating results to the patient/family/caregiver, or Care coordination (not separately reported).         Each patient to whom he or she provides medical services by telemedicine is:  (1) informed of the relationship between the physician and patient and the respective role of any other health care provider with respect to management of the patient; and (2) notified that he or she may decline to receive medical services by telemedicine and may withdraw from such care at any time.    Notes:    Subjective:   Bonnie TRAN, attest that this documentation has been prepared under the direction and in the presence of Wellington Park MD.     Patient ID: Marvin Pool is a 48 y.o. male     Chief Complaint: No chief complaint on file.      HPI  Mr. Marvin Pool is a 48 y.o. 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 Pt presents today for follow up of MRI perfusion. Today the pt reports he is doing well overall. He is primarily concerned in regards to his chronic dehydration. Counseled the patient on adequate hydration. Denies any neurologic complaints otherwise.     Review of Systems   Constitutional:  Negative for activity change, appetite change, fatigue, fever and unexpected weight change.   HENT:  Negative for facial swelling.    Eyes: Negative.    Respiratory: Negative.     Cardiovascular: Negative.     Gastrointestinal:  Negative for diarrhea, nausea and vomiting.   Endocrine: Negative.    Genitourinary: Negative.    Musculoskeletal:  Negative for back pain, joint swelling, myalgias and neck pain.   Neurological:  Negative for dizziness, seizures, weakness, numbness and headaches.   Psychiatric/Behavioral: Negative.          Past Medical History:   Diagnosis Date    Brain tumor 1/12/2023    Seizures 1/12/2023       Objective:    There were no vitals filed for this visit.   Physical Exam  Constitutional:       General: He is not in acute distress.     Appearance: Normal appearance.   HENT:      Head: Normocephalic and atraumatic.   Neurological:      Mental Status: He is alert and oriented to person, place, and time.            IMAGING:  MRI Brain (Tumor with Perfusion) (4/13/2024):  Operative change with left frontal parietal craniotomy with relatively stable left frontal resection cavity.  There is continued T2 FLAIR signal hyperintensity about the resection cavity with few satellite small regions of nodular enhancement relatively unchanged from most recent prior.  No evidence for new signal abnormality or enhancement to suggest worsening residual/recurrent lesion. No elevated relative cerebral blood volume to suggest high-grade neoplasm.      I have personally reviewed the images with the pt.      I, Dr. Wellington Park, personally performed the services described in this documentation. All medical record entries made by the scribe, Bonnie Ly, were at my direction and in my presence.  I have reviewed the chart and agree that the record reflects my personal performance and is accurate and complete. Wellington Park MD. 04/16/2024    Assessment:       Low grade glioma     Plan:   I have personally reviewed the MRI brain with the pt which shows operative change with left frontal parietal craniotomy with relatively stable left frontal resection cavity.  There is continued T2 FLAIR signal hyperintensity about the  resection cavity with few satellite small regions of nodular enhancement relatively unchanged from most recent prior.  No evidence for new signal abnormality or enhancement to suggest worsening residual/recurrent lesion. No elevated relative cerebral blood volume to suggest high-grade neoplasm.    I will schedule the patient for 3 month follow up with MRI brain.

## 2024-04-16 NOTE — PATIENT INSTRUCTIONS
I have personally reviewed the MRI brain with the pt which shows operative change with left frontal parietal craniotomy with relatively stable left frontal resection cavity.  There is continued T2 FLAIR signal hyperintensity about the resection cavity with few satellite small regions of nodular enhancement relatively unchanged from most recent prior.  No evidence for new signal abnormality or enhancement to suggest worsening residual/recurrent lesion. No elevated relative cerebral blood volume to suggest high-grade neoplasm.    I will schedule the patient for 3 month follow up with MRI brain.

## 2024-04-18 DIAGNOSIS — C71.1 MALIGNANT NEOPLASM OF FRONTAL LOBE: Chronic | ICD-10-CM

## 2024-04-18 RX ORDER — SULFAMETHOXAZOLE AND TRIMETHOPRIM 800; 160 MG/1; MG/1
1 TABLET ORAL DAILY
Qty: 30 TABLET | Refills: 0 | Status: SHIPPED | OUTPATIENT
Start: 2024-04-18 | End: 2024-05-20

## 2024-05-17 DIAGNOSIS — C71.1 MALIGNANT NEOPLASM OF FRONTAL LOBE: Primary | ICD-10-CM

## 2024-05-17 DIAGNOSIS — C71.1 MALIGNANT NEOPLASM OF FRONTAL LOBE: ICD-10-CM

## 2024-05-17 DIAGNOSIS — R11.2 CHEMOTHERAPY-INDUCED NAUSEA AND VOMITING: ICD-10-CM

## 2024-05-17 DIAGNOSIS — D49.6 BRAIN TUMOR: ICD-10-CM

## 2024-05-17 DIAGNOSIS — D63.0 ANEMIA IN NEOPLASTIC DISEASE: ICD-10-CM

## 2024-05-17 DIAGNOSIS — T45.1X5A CHEMOTHERAPY-INDUCED NAUSEA AND VOMITING: ICD-10-CM

## 2024-05-17 DIAGNOSIS — D63.0 ANEMIA IN NEOPLASTIC DISEASE: Primary | ICD-10-CM

## 2024-05-19 DIAGNOSIS — C71.1 MALIGNANT NEOPLASM OF FRONTAL LOBE: Chronic | ICD-10-CM

## 2024-05-20 LAB
ALBUMIN SERPL BCP-MCNC: 3.9 G/DL (ref 3.4–5)
ALBUMIN/GLOBULIN RATIO: 1.05 RATIO (ref 1.1–1.8)
ALP SERPL-CCNC: 89 U/L (ref 46–116)
ALT SERPL W P-5'-P-CCNC: 35 U/L (ref 12–78)
ANION GAP SERPL CALC-SCNC: 9 MMOL/L (ref 3–11)
AST SERPL-CCNC: 18 U/L (ref 15–37)
BASOPHILS NFR BLD: 0.8 % (ref 0–3)
BILIRUB SERPL-MCNC: 0.4 MG/DL (ref 0–1)
BUN SERPL-MCNC: 16 MG/DL (ref 7–18)
BUN/CREAT SERPL: 15.38 RATIO (ref 7–18)
CALCIUM SERPL-MCNC: 8.8 MG/DL (ref 8.8–10.5)
CHLORIDE SERPL-SCNC: 101 MMOL/L (ref 100–108)
CO2 SERPL-SCNC: 27 MMOL/L (ref 21–32)
CREAT SERPL-MCNC: 1.04 MG/DL (ref 0.7–1.3)
EOSINOPHIL NFR BLD: 1.8 % (ref 1–3)
ERYTHROCYTE [DISTWIDTH] IN BLOOD BY AUTOMATED COUNT: 12.6 % (ref 12.5–18)
GFR ESTIMATION: > 60
GLOBULIN: 3.7 G/DL (ref 2.3–3.5)
GLUCOSE SERPL-MCNC: 109 MG/DL (ref 70–110)
HCT VFR BLD AUTO: 43 % (ref 42–52)
HGB BLD-MCNC: 14.6 G/DL (ref 14–18)
LYMPHOCYTES NFR BLD: 12.9 % (ref 25–40)
MCH RBC QN AUTO: 28.3 PG (ref 27–31.2)
MCHC RBC AUTO-ENTMCNC: 34 G/DL (ref 31.8–35.4)
MCV RBC AUTO: 83.5 FL (ref 80–97)
MONOCYTES NFR BLD: 11.9 % (ref 1–15)
NEUTROPHILS # BLD AUTO: 5.08 10*3/UL (ref 1.8–7.7)
NEUTROPHILS NFR BLD: 72 % (ref 37–80)
NUCLEATED RED BLOOD CELLS: 0 %
PLATELETS: 403 10*3/UL (ref 142–424)
POTASSIUM SERPL-SCNC: 4.4 MMOL/L (ref 3.6–5.2)
PROT SERPL-MCNC: 7.6 G/DL (ref 6.4–8.2)
RBC # BLD AUTO: 5.15 10*6/UL (ref 4.7–6.1)
SODIUM BLD-SCNC: 137 MMOL/L (ref 135–145)
WBC # BLD: 7.1 10*3/UL (ref 4.6–10.2)

## 2024-05-20 RX ORDER — SULFAMETHOXAZOLE AND TRIMETHOPRIM 800; 160 MG/1; MG/1
1 TABLET ORAL DAILY
Qty: 30 TABLET | Refills: 0 | Status: SHIPPED | OUTPATIENT
Start: 2024-05-20

## 2024-05-21 ENCOUNTER — OFFICE VISIT (OUTPATIENT)
Dept: HEMATOLOGY/ONCOLOGY | Facility: CLINIC | Age: 49
End: 2024-05-21
Payer: COMMERCIAL

## 2024-05-21 VITALS
BODY MASS INDEX: 24.34 KG/M2 | SYSTOLIC BLOOD PRESSURE: 118 MMHG | OXYGEN SATURATION: 96 % | WEIGHT: 170 LBS | HEART RATE: 70 BPM | RESPIRATION RATE: 16 BRPM | DIASTOLIC BLOOD PRESSURE: 72 MMHG | HEIGHT: 70 IN

## 2024-05-21 DIAGNOSIS — C71.1 MALIGNANT NEOPLASM OF FRONTAL LOBE: Primary | ICD-10-CM

## 2024-05-21 PROCEDURE — 3074F SYST BP LT 130 MM HG: CPT | Mod: CPTII,S$GLB,, | Performed by: INTERNAL MEDICINE

## 2024-05-21 PROCEDURE — 1159F MED LIST DOCD IN RCRD: CPT | Mod: CPTII,S$GLB,, | Performed by: INTERNAL MEDICINE

## 2024-05-21 PROCEDURE — 3078F DIAST BP <80 MM HG: CPT | Mod: CPTII,S$GLB,, | Performed by: INTERNAL MEDICINE

## 2024-05-21 PROCEDURE — 99214 OFFICE O/P EST MOD 30 MIN: CPT | Mod: S$GLB,,, | Performed by: INTERNAL MEDICINE

## 2024-05-21 PROCEDURE — 3008F BODY MASS INDEX DOCD: CPT | Mod: CPTII,S$GLB,, | Performed by: INTERNAL MEDICINE

## 2024-05-21 NOTE — PROGRESS NOTES
MEDICAL ONCOLOGY FOLLOW UP CONSULTATION NOTE    Patient ID: Marvin Pool is a 48 y.o. male.    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  He also completed chemo XRT and now is currently on adjuvant Temodar.  He would like to establish care here locally in [place] and voices no acute complaints reporting that he is tolerated Temodar without any side effects at this time.  He presents to our clinic today for further evaluation        Imaging:       MRI brain:  01/12/23    Expected immediate postsurgical changes of left frontal craniotomy for frontal mass resection.     Persistent area of T2 FLAIR hyperintense signal surrounding the resection is similar size compared to preoperative MRI. Typical thin peripheral enhancement around the resection cavity.        Past Medical History:   Diagnosis Date    Brain tumor 1/12/2023    Seizures 1/12/2023     Family History   Problem Relation Name Age of Onset    No Known Problems Mother      No Known Problems Father      No Known Problems Sister      No Known Problems Brother      No Known Problems Maternal Aunt       Social History     Socioeconomic History    Marital status:    Tobacco Use    Smoking status: Never    Smokeless tobacco: Never   Substance and Sexual Activity    Alcohol use: Not Currently     Social Determinants of Health     Financial Resource Strain: Low Risk  (11/16/2023)    Overall Financial Resource Strain (CARDIA)     Difficulty of Paying Living Expenses: Not very hard   Food Insecurity: No Food Insecurity (11/16/2023)    Hunger Vital Sign     Worried About Running Out of Food in the Last Year: Never true     Ran Out of Food in the Last Year: Never true   Transportation Needs: No Transportation Needs (11/16/2023)    PRAPARE - Transportation     Lack of Transportation  (Medical): No     Lack of Transportation (Non-Medical): No   Physical Activity: Sufficiently Active (11/16/2023)    Exercise Vital Sign     Days of Exercise per Week: 6 days     Minutes of Exercise per Session: 120 min   Stress: Stress Concern Present (11/16/2023)    [facility] - Occupational Stress Questionnaire     Feeling of Stress : Rather much   Housing Stability: High Risk (11/16/2023)    Housing Stability Vital Sign     Unable to Pay for Housing in the Last Year: Yes     Number of Places Lived in the Last Year: 1     Unstable Housing in the Last Year: No     Past Surgical History:   Procedure Laterality Date    CRANIOTOMY USING FRAMELESS STEREOTAXY Left 1/12/2023    Procedure: CRANIOTOMY, USING FRAMELESS STEREOTAXY;  Surgeon: Wellington Park MD;  Location: [facility] OR [address] [place] [address];  Service: Neurosurgery;  Laterality: Left;         Review of systems:  Review of Systems   Constitutional:  Negative for activity change, appetite change, chills, diaphoresis, fatigue and unexpected weight change.   HENT:  Negative for congestion, facial swelling, hearing loss, mouth sores, trouble swallowing and voice change.    Eyes:  Negative for photophobia, pain, discharge and itching.   Respiratory:  Negative for apnea, cough, choking, chest tightness and shortness of breath.    Cardiovascular:  Negative for chest pain, palpitations and leg swelling.   Gastrointestinal:  Negative for abdominal distention, abdominal pain, anal bleeding and blood in stool.   Endocrine: Negative for cold intolerance, heat intolerance, polydipsia and polyphagia.   Genitourinary:  Negative for difficulty urinating, dysuria, flank pain and hematuria.   Musculoskeletal:  Negative for arthralgias, back pain, joint swelling, myalgias, neck pain and neck stiffness.   Skin:  Negative for color change, pallor and wound.   Allergic/Immunologic: Negative for environmental allergies, food allergies and immunocompromised state.    Neurological:  Positive for speech difficulty and headaches. Negative for dizziness, seizures, facial asymmetry, light-headedness and numbness.   Hematological:  Negative for adenopathy. Does not bruise/bleed easily.   Psychiatric/Behavioral:  Negative for agitation, behavioral problems, confusion, decreased concentration and sleep disturbance.            Physical Exam  Vitals and nursing note reviewed.   Constitutional:       General: He is not in acute distress.     Appearance: Normal appearance. He is not ill-appearing.   HENT:      Head: Normocephalic and atraumatic.      Nose: No congestion or rhinorrhea.   Eyes:      General: No scleral icterus.     Extraocular Movements: Extraocular movements intact.      Pupils: Pupils are equal, round, and reactive to light.   Cardiovascular:      Rate and Rhythm: Normal rate and regular rhythm.      Pulses: Normal pulses.      Heart sounds: Normal heart sounds. No murmur heard.     No gallop.   Pulmonary:      Effort: Pulmonary effort is normal. No respiratory distress.      Breath sounds: Normal breath sounds. No stridor. No wheezing or rhonchi.   Abdominal:      General: Bowel sounds are normal. There is no distension.      Palpations: There is no mass.      Tenderness: There is no abdominal tenderness. There is no guarding.   Musculoskeletal:         General: No swelling, tenderness, deformity or signs of injury. Normal range of motion.      Cervical back: Normal range of motion and neck supple. No rigidity. No muscular tenderness.      Right lower leg: No edema.      Left lower leg: No edema.   Skin:     General: Skin is warm.      Coloration: Skin is not jaundiced or pale.      Findings: No bruising or lesion.   Neurological:      General: No focal deficit present.      Mental Status: He is alert and oriented to person, place, and time.      Cranial Nerves: No cranial nerve deficit.      Sensory: No sensory deficit.      Motor: No weakness.      Gait: Gait normal.    Psychiatric:         Mood and Affect: Mood normal.         Behavior: Behavior normal.         Thought Content: Thought content normal.                 Vitals:    05/21/24 0906   BP: 118/72   Pulse: 70   Resp: 16         Body surface area is 1.95 meters squared.    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   == s/p Chemo-XRT completed recently and now on adjuvant Temodar tolerating well. I will add Bactrim DS for PCP prohylaxis at this time and see patient prior to C#2  == 7/10/23: Labs reviewed. Tolerating Bactrim DS well with no dose limiting toxicities. Voices no complaints. Next MRI brain in Aug 2023  ==8/07/23: First cycle of adjuvant Temozolomide well tolerated, patient has some mild nausea relieved with phenergan prn. He is not taking ondansetron states phenergan works better.  Temozolomide dosage is 390mg days 1-5 q 28 days.  Reports good compliance with bactrim for pcp prophylaxis. MRI Brain reviewed Next MRI brain is scheduled November 6th 2023.  No headaches, visual changes. Good compliance with Keppra, will check keppra level with labs.  ==09/05/23: Patient is s/p 2 cycles of adjuvant Temozolomide, has intermittent nausea relieved with phenergan.  No s/s infection, is on bactrim for pcp prophylaxis.  Plan to continue adjuvant Temozolomide 6-12 cycles with MRI brain on 11/6/23 has set up per radiation oncology. Keppra level not drawn, will add to orders  ==10/09/23: Patient doing well, recently saw neurosurgery with MRI brain which was noted to have continued stable residual disease.  Temozolomide well tolerated, reports occasional nausea relieved with ondansetron.  Will continue with Temozolomide, patient is cleared to start cycle 4 with 6-12 planned adjuvant cycles. Next MRI brain scheduled by neurosurgery in 4 months  ==11/6/23: Patient due to start cycle  5 adjuvant Temozolomide.  Tolerating well.  ==12/20/23: MRI brain 12/4/23 shows stable disease with post radiation changes. Stable T2 hyperintense signal at the operative cavity which may be a combination of tumor and or gliosis.  ==01/17/2024: Pt due for cycle 7 adjuvant Temozolomide.  He reports some headaches, mild expressive aphasia and stuttering intermittent for about last month.  He has MRI scheduled on 2/26/2024 scheduled by Dr. Park, patient's neurosurgeon.  I sent a message to Dr. Park regarding MRI to see if he would like to move it up.   ==01/18/2024 Addendum: message received from Dr. Park, neurosurgeon.  He will have his office move up patient's MRI to be completed in [facility].  His office will contact patient  ==03/21/24: Pt is s/p cycle 8 adjuvant temozolomide. He recently had MRI and appt with Dr. Park, neurosurgeon which showed no evidence of recurrence or progression.  He was also having some difficulty voiding and urinary retention, saw Dr. Garay who performed a cystoscope which was normal. He has been unable to get Temodar for the last 2-3 weeks citing insurance and logistics issue. I will have my staff help him out in this regard.  == 5/27/24: Continuing with Temodar. Voices no acute issues. Last MRI brain April 2024 shows completely stable postop changes and no post radiation change with no evidence of tumor progression. Will continue with 12 cycles.    2. Anemia  ==mild, asymptomatic hgb 13.8    3. Nasal congestion  ==Intermittent, no sinus pain/pressure or postnasal drip  ==may use otc antihistamines, Flonase, simple saline nasal spray prn    Plan:  Continue Temodar for 12 planned adjuvant cycles  Continue Bactrim DS for PCP prophylaxis  RTC 4 weeks for MD visit with labs: CBC, CMP prior      Future Appointments   Date Time Provider Department Center   6/24/2024  8:50 AM LAB TESTING, [facility] HEMON LC [address]   6/25/2024  9:40 AM John Graves MD [facility] [address]   7/12/2024  10:00 AM [facility] MRI1 450 LB LIMIT [facility] MRI [place]   7/16/2024  9:30 AM Wellington Park MD [facility] NEUROSC [address]           Total time spent in counseling and discussion about further management options including relevant lab work, treatment,  prognosis, medications and intended side effects was more than 30 minutes. More than 50% of the time was spent on counseling and coordination of care.  I spent a total of 30 minutes on the day of the visit.This includes face to face time and non-face to face time preparing to see the patient (eg, review of tests), Obtaining and/or reviewing separately obtained history, Documenting clinical information in the electronic or other health record, Independently interpreting resultsand communicating results to the patient/family/caregiver, or Care coordination.

## 2024-06-20 DIAGNOSIS — C71.1 MALIGNANT NEOPLASM OF FRONTAL LOBE: Chronic | ICD-10-CM

## 2024-06-20 RX ORDER — SULFAMETHOXAZOLE AND TRIMETHOPRIM 800; 160 MG/1; MG/1
1 TABLET ORAL DAILY
Qty: 30 TABLET | Refills: 0 | Status: SHIPPED | OUTPATIENT
Start: 2024-06-20

## 2024-06-24 LAB
ALBUMIN SERPL BCP-MCNC: 3.8 G/DL (ref 3.4–5)
ALBUMIN/GLOBULIN RATIO: 1.23 RATIO (ref 1.1–1.8)
ALP SERPL-CCNC: 65 U/L (ref 46–116)
ALT SERPL W P-5'-P-CCNC: 36 U/L (ref 12–78)
ANION GAP SERPL CALC-SCNC: 10 MMOL/L (ref 3–11)
AST SERPL-CCNC: 18 U/L (ref 15–37)
BANDS: 1 % (ref 0–5)
BILIRUB SERPL-MCNC: 0.4 MG/DL (ref 0–1)
BUN SERPL-MCNC: 15 MG/DL (ref 7–18)
BUN/CREAT SERPL: 14.28 RATIO (ref 7–18)
CALCIUM SERPL-MCNC: 8.7 MG/DL (ref 8.8–10.5)
CELLS COUNTED: 100
CHLORIDE SERPL-SCNC: 102 MMOL/L (ref 100–108)
CO2 SERPL-SCNC: 25 MMOL/L (ref 21–32)
CREAT SERPL-MCNC: 1.05 MG/DL (ref 0.7–1.3)
EOSINOPHIL NFR BLD: 3 % (ref 1–3)
ERYTHROCYTE [DISTWIDTH] IN BLOOD BY AUTOMATED COUNT: 13 % (ref 12.5–18)
GFR ESTIMATION: > 60
GLOBULIN: 3.1 G/DL (ref 2.3–3.5)
GLUCOSE SERPL-MCNC: 79 MG/DL (ref 70–110)
HCT VFR BLD AUTO: 40.8 % (ref 42–52)
HGB BLD-MCNC: 14.1 G/DL (ref 14–18)
LYMPHOCYTES NFR BLD: 10 % (ref 25–40)
MCH RBC QN AUTO: 28.7 PG (ref 27–31.2)
MCHC RBC AUTO-ENTMCNC: 34.6 G/DL (ref 31.8–35.4)
MCV RBC AUTO: 82.9 FL (ref 80–97)
MONOCYTES NFR BLD: 12 % (ref 1–15)
NEUTROPHILS # BLD AUTO: 4.4 10*3/UL (ref 1.8–7.7)
NEUTROPHILS NFR BLD: 74 % (ref 37–80)
NUCLEATED RED BLOOD CELLS: 0 %
PLATELETS: 357 10*3/UL (ref 142–424)
POTASSIUM SERPL-SCNC: 4.2 MMOL/L (ref 3.6–5.2)
PROT SERPL-MCNC: 6.9 G/DL (ref 6.4–8.2)
RBC # BLD AUTO: 4.92 10*6/UL (ref 4.7–6.1)
RBC MORPH BLD: ABNORMAL
SMALL PLATELETS BLD QL SMEAR: ADEQUATE
SODIUM BLD-SCNC: 137 MMOL/L (ref 135–145)
WBC # BLD: 5.8 10*3/UL (ref 4.6–10.2)

## 2024-06-25 ENCOUNTER — OFFICE VISIT (OUTPATIENT)
Dept: HEMATOLOGY/ONCOLOGY | Facility: CLINIC | Age: 49
End: 2024-06-25
Payer: COMMERCIAL

## 2024-06-25 VITALS
HEART RATE: 57 BPM | HEIGHT: 70 IN | RESPIRATION RATE: 16 BRPM | OXYGEN SATURATION: 97 % | DIASTOLIC BLOOD PRESSURE: 63 MMHG | WEIGHT: 167.5 LBS | SYSTOLIC BLOOD PRESSURE: 117 MMHG | BODY MASS INDEX: 23.98 KG/M2

## 2024-06-25 DIAGNOSIS — T45.1X5A CHEMOTHERAPY-INDUCED NAUSEA AND VOMITING: ICD-10-CM

## 2024-06-25 DIAGNOSIS — C71.1 MALIGNANT NEOPLASM OF FRONTAL LOBE: Primary | ICD-10-CM

## 2024-06-25 DIAGNOSIS — R11.2 CHEMOTHERAPY-INDUCED NAUSEA AND VOMITING: ICD-10-CM

## 2024-06-25 PROCEDURE — 3008F BODY MASS INDEX DOCD: CPT | Mod: CPTII,S$GLB,, | Performed by: NURSE PRACTITIONER

## 2024-06-25 PROCEDURE — 1159F MED LIST DOCD IN RCRD: CPT | Mod: CPTII,S$GLB,, | Performed by: NURSE PRACTITIONER

## 2024-06-25 PROCEDURE — 99215 OFFICE O/P EST HI 40 MIN: CPT | Mod: S$GLB,,, | Performed by: NURSE PRACTITIONER

## 2024-06-25 PROCEDURE — 3074F SYST BP LT 130 MM HG: CPT | Mod: CPTII,S$GLB,, | Performed by: NURSE PRACTITIONER

## 2024-06-25 PROCEDURE — G2211 COMPLEX E/M VISIT ADD ON: HCPCS | Mod: S$GLB,,, | Performed by: NURSE PRACTITIONER

## 2024-06-25 PROCEDURE — 3078F DIAST BP <80 MM HG: CPT | Mod: CPTII,S$GLB,, | Performed by: NURSE PRACTITIONER

## 2024-06-25 NOTE — PROGRESS NOTES
MEDICAL ONCOLOGY FOLLOW UP CONSULTATION NOTE    Patient ID: Marvin Pool is a 48 y.o. male.    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  He also completed chemo XRT and now is currently on adjuvant Temodar.  He would like to establish care here locally in [place] and voices no acute complaints reporting that he is tolerated Temodar without any side effects at this time.  He presents to our clinic today for further evaluation        Imaging:       MRI brain:  01/12/23    Expected immediate postsurgical changes of left frontal craniotomy for frontal mass resection.     Persistent area of T2 FLAIR hyperintense signal surrounding the resection is similar size compared to preoperative MRI. Typical thin peripheral enhancement around the resection cavity.        Past Medical History:   Diagnosis Date    Brain tumor 1/12/2023    Seizures 1/12/2023     Family History   Problem Relation Name Age of Onset    No Known Problems Mother      No Known Problems Father      No Known Problems Sister      No Known Problems Brother      No Known Problems Maternal Aunt       Social History     Socioeconomic History    Marital status:    Tobacco Use    Smoking status: Never    Smokeless tobacco: Never   Substance and Sexual Activity    Alcohol use: Not Currently     Social Determinants of Health     Financial Resource Strain: Low Risk  (11/16/2023)    Overall Financial Resource Strain (CARDIA)     Difficulty of Paying Living Expenses: Not very hard   Food Insecurity: No Food Insecurity (11/16/2023)    Hunger Vital Sign     Worried About Running Out of Food in the Last Year: Never true     Ran Out of Food in the Last Year: Never true   Transportation Needs: No Transportation Needs (11/16/2023)    PRAPARE - Transportation     Lack of Transportation  (Medical): No     Lack of Transportation (Non-Medical): No   Physical Activity: Sufficiently Active (11/16/2023)    Exercise Vital Sign     Days of Exercise per Week: 6 days     Minutes of Exercise per Session: 120 min   Stress: Stress Concern Present (11/16/2023)    [facility]ational [facility] - Occupational Stress Questionnaire     Feeling of Stress : Rather much   Housing Stability: High Risk (11/16/2023)    Housing Stability Vital Sign     Unable to Pay for Housing in the Last Year: Yes     Number of Places Lived in the Last Year: 1     Unstable Housing in the Last Year: No     Past Surgical History:   Procedure Laterality Date    CRANIOTOMY USING FRAMELESS STEREOTAXY Left 1/12/2023    Procedure: CRANIOTOMY, USING FRAMELESS STEREOTAXY;  Surgeon: Wellington Park MD;  Location: [facility] OR [address] [place] [address];  Service: Neurosurgery;  Laterality: Left;         Review of systems:  Review of Systems   Constitutional:  Negative for activity change, appetite change, chills, diaphoresis, fatigue and unexpected weight change.   HENT:  Negative for congestion, facial swelling, hearing loss, mouth sores, trouble swallowing and voice change.    Eyes:  Negative for photophobia, pain, discharge and itching.   Respiratory:  Negative for apnea, cough, choking, chest tightness and shortness of breath.    Cardiovascular:  Negative for chest pain, palpitations and leg swelling.   Gastrointestinal:  Negative for abdominal distention, abdominal pain, anal bleeding and blood in stool.   Endocrine: Negative for cold intolerance, heat intolerance, polydipsia and polyphagia.   Genitourinary:  Negative for difficulty urinating, dysuria, flank pain and hematuria.   Musculoskeletal:  Negative for arthralgias, back pain, joint swelling, myalgias, neck pain and neck stiffness.   Skin:  Negative for color change, pallor and wound.   Allergic/Immunologic: Negative for environmental allergies, food allergies and immunocompromised state.    Neurological:  Positive for speech difficulty and headaches. Negative for dizziness, seizures, facial asymmetry, light-headedness and numbness.   Hematological:  Negative for adenopathy. Does not bruise/bleed easily.   Psychiatric/Behavioral:  Negative for agitation, behavioral problems, confusion, decreased concentration and sleep disturbance.            Physical Exam  Vitals and nursing note reviewed.   Constitutional:       General: He is not in acute distress.     Appearance: Normal appearance. He is not ill-appearing.   HENT:      Head: Normocephalic and atraumatic.      Nose: No congestion or rhinorrhea.   Eyes:      General: No scleral icterus.     Extraocular Movements: Extraocular movements intact.      Pupils: Pupils are equal, round, and reactive to light.   Cardiovascular:      Rate and Rhythm: Normal rate and regular rhythm.      Pulses: Normal pulses.      Heart sounds: Normal heart sounds. No murmur heard.     No gallop.   Pulmonary:      Effort: Pulmonary effort is normal. No respiratory distress.      Breath sounds: Normal breath sounds. No stridor. No wheezing or rhonchi.   Abdominal:      General: Bowel sounds are normal. There is no distension.      Palpations: There is no mass.      Tenderness: There is no abdominal tenderness. There is no guarding.   Musculoskeletal:         General: No swelling, tenderness, deformity or signs of injury. Normal range of motion.      Cervical back: Normal range of motion and neck supple. No rigidity. No muscular tenderness.      Right lower leg: No edema.      Left lower leg: No edema.   Skin:     General: Skin is warm.      Coloration: Skin is not jaundiced or pale.      Findings: No bruising or lesion.   Neurological:      General: No focal deficit present.      Mental Status: He is alert and oriented to person, place, and time.      Cranial Nerves: No cranial nerve deficit.      Sensory: No sensory deficit.      Motor: No weakness.      Gait: Gait normal.    Psychiatric:         Mood and Affect: Mood normal.         Behavior: Behavior normal.         Thought Content: Thought content normal.                 There were no vitals filed for this visit.        There is no height or weight on file to calculate BSA.    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   == s/p Chemo-XRT completed recently and now on adjuvant Temodar tolerating well. I will add Bactrim DS for PCP prohylaxis at this time and see patient prior to C#2  == 7/10/23: Labs reviewed. Tolerating Bactrim DS well with no dose limiting toxicities. Voices no complaints. Next MRI brain in Aug 2023  ==8/07/23: First cycle of adjuvant Temozolomide well tolerated, patient has some mild nausea relieved with phenergan prn. He is not taking ondansetron states phenergan works better.  Temozolomide dosage is 390mg days 1-5 q 28 days.  Reports good compliance with bactrim for pcp prophylaxis. MRI Brain reviewed Next MRI brain is scheduled November 6th 2023.  No headaches, visual changes. Good compliance with Keppra, will check keppra level with labs.  ==09/05/23: Patient is s/p 2 cycles of adjuvant Temozolomide, has intermittent nausea relieved with phenergan.  No s/s infection, is on bactrim for pcp prophylaxis.  Plan to continue adjuvant Temozolomide 6-12 cycles with MRI brain on 11/6/23 has set up per radiation oncology. Keppra level not drawn, will add to orders  ==10/09/23: Patient doing well, recently saw neurosurgery with MRI brain which was noted to have continued stable residual disease.  Temozolomide well tolerated, reports occasional nausea relieved with ondansetron.  Will continue with Temozolomide, patient is cleared to start cycle 4 with 6-12 planned adjuvant cycles. Next MRI brain scheduled by neurosurgery in 4 months  ==11/6/23: Patient due to start cycle 5  adjuvant Temozolomide.  Tolerating well.  ==12/20/23: MRI brain 12/4/23 shows stable disease with post radiation changes. Stable T2 hyperintense signal at the operative cavity which may be a combination of tumor and or gliosis.  ==01/17/2024: Pt due for cycle 7 adjuvant Temozolomide.  He reports some headaches, mild expressive aphasia and stuttering intermittent for about last month.  He has MRI scheduled on 2/26/2024 scheduled by Dr. Park, patient's neurosurgeon.  I sent a message to Dr. Park regarding MRI to see if he would like to move it up.   ==01/18/2024 Addendum: message received from Dr. Park, neurosurgeon.  He will have his office move up patient's MRI to be completed in [facility].  His office will contact patient  ==03/21/24: Pt is s/p cycle 8 adjuvant temozolomide. He recently had MRI and appt with Dr. Park, neurosurgeon which showed no evidence of recurrence or progression.  He was also having some difficulty voiding and urinary retention, saw Dr. Garay who performed a cystoscope which was normal. He has been unable to get Temodar for the last 2-3 weeks citing insurance and logistics issue. I will have my staff help him out in this regard.  == 5/27/24: Continuing with Temodar. Voices no acute issues. Last MRI brain April 2024 shows completely stable postop changes and no post radiation change with no evidence of tumor progression. Will continue with 12 cycles.  ==06/25/2024: Patient due for 12th of 12 planned Temodar cycles.  Next MRI brain scheduled on 7/17/2024 per Dr. Park.  Tolerating temodar well, will proceed with 12th cycle    2. Anemia  ==resolved    3. Nasal congestion  ==Resolved    Plan:  Continue Temodar for 12 planned adjuvant cycles  Continue Bactrim DS for PCP prophylaxis  RTC 4 weeks for MD visit with labs: CBC, CMP prior - pt will have MRI results and will have completed 12 planned cycles of Temodar      Future Appointments   Date Time Provider Department Center   6/25/2024  9:40 AM [place]  [facility] BROTHERS [facility] [address]   7/12/2024 10:00 AM [facility] MRI1 450 LB LIMIT OA MRI [facility]   7/16/2024  9:30 AM Wellington Park MD [facility] NEUROSC [facility]           Total time spent in counseling and discussion about further management options including relevant lab work, treatment,  prognosis, medications and intended side effects was more than 30 minutes. More than 50% of the time was spent on counseling and coordination of care.  I spent a total of 30 minutes on the day of the visit.This includes face to face time and non-face to face time preparing to see the patient (eg, review of tests), Obtaining and/or reviewing separately obtained history, Documenting clinical information in the electronic or other health record, Independently interpreting resultsand communicating results to the patient/family/caregiver, or Care coordination.

## 2024-07-12 ENCOUNTER — HOSPITAL ENCOUNTER (OUTPATIENT)
Dept: RADIOLOGY | Facility: HOSPITAL | Age: 49
Discharge: HOME | End: 2024-07-12
Attending: NEUROLOGICAL SURGERY
Payer: COMMERCIAL

## 2024-07-12 DIAGNOSIS — D49.6 BRAIN TUMOR: ICD-10-CM

## 2024-07-12 DIAGNOSIS — C71.9 OLIGODENDROGLIOMA: ICD-10-CM

## 2024-07-12 PROCEDURE — 70553 MRI BRAIN STEM W/O & W/DYE: CPT | Mod: TC

## 2024-07-12 PROCEDURE — 25500020 PHARM REV CODE 255: Performed by: NEUROLOGICAL SURGERY

## 2024-07-12 PROCEDURE — A9577 INJ MULTIHANCE: HCPCS | Performed by: NEUROLOGICAL SURGERY

## 2024-07-12 RX ADMIN — GADOBENATE DIMEGLUMINE 17 ML: 529 INJECTION, SOLUTION INTRAVENOUS at 10:07

## 2024-07-16 ENCOUNTER — OFFICE VISIT (OUTPATIENT)
Dept: NEUROSURGERY | Facility: CLINIC | Age: 49
End: 2024-07-16
Payer: COMMERCIAL

## 2024-07-16 DIAGNOSIS — C71.9 OLIGODENDROGLIOMA: Primary | ICD-10-CM

## 2024-07-16 DIAGNOSIS — D49.6 BRAIN TUMOR: Primary | ICD-10-CM

## 2024-07-16 DIAGNOSIS — C71.9 OLIGODENDROGLIOMA: ICD-10-CM

## 2024-07-16 PROCEDURE — 99214 OFFICE O/P EST MOD 30 MIN: CPT | Mod: 95,,, | Performed by: NEUROLOGICAL SURGERY

## 2024-07-16 PROCEDURE — 1159F MED LIST DOCD IN RCRD: CPT | Mod: CPTII,95,, | Performed by: NEUROLOGICAL SURGERY

## 2024-07-16 PROCEDURE — 1160F RVW MEDS BY RX/DR IN RCRD: CPT | Mod: CPTII,95,, | Performed by: NEUROLOGICAL SURGERY

## 2024-07-16 NOTE — PATIENT INSTRUCTIONS
I have personally reviewed the MRI BRAIN W WO CONTRAST with the pt which shows:  1. 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  The amount of abnormal T2 signal is similar.  2. Similar appearance of several subcentimeter non enhancing abnormal signal foci separate from the resection cavity in the left frontal lobe and also in the left parietal and right frontal lobes that are nonspecific but are statistically most likely white matter microvascular ischemic changes are focal gliosis given the patient's age.    I will schedule the patient for f/u visit in 3mo with MRI brain W WO contrast.

## 2024-07-16 NOTE — PROGRESS NOTES
The patient location is: [place]  The chief complaint leading to consultation is: 3mo f/u glioma w/ MRI     Visit type: audiovisual    Face to Face time with patient: 15  35 minutes of total time spent on the encounter, which includes face to face time and non-face to face time preparing to see the patient (eg, review of tests), Obtaining and/or reviewing separately obtained history, Documenting clinical information in the electronic or other health record, Independently interpreting results (not separately reported) and communicating results to the patient/family/caregiver, or Care coordination (not separately reported).     Each patient to whom he or she provides medical services by telemedicine is:  (1) informed of the relationship between the physician and patient and the respective role of any other health care provider with respect to management of the patient; and (2) notified that he or she may decline to receive medical services by telemedicine and may withdraw from such care at any time.    Notes:    Subjective:   I, Alex Spencer, attest that this documentation has been prepared under the direction and in the presence of Wellington Park MD.     Patient ID: Marvin Pool is a 48 y.o. male     Chief Complaint: No chief complaint on file.      HPI  Mr. Marvin Pool is a 48 y.o. 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 Pt presents today for his 3mo follow up visit with an MRI perfusion. At the time of our last clinic visit on 4/16/24,  the pt reported that he is doing well overall. He is primarily concerned in regards to his chronic dehydration. He was counseled on adequate hydration. Denies any neurologic complaints otherwise.     Today the pt reports he is feeling well with good energy levels and no new neurological complaints or concerns.      Review of Systems   Constitutional:  Negative for activity change, appetite  change, fatigue, fever and unexpected weight change.   HENT:  Negative for facial swelling.    Eyes: Negative.    Respiratory: Negative.     Cardiovascular: Negative.    Gastrointestinal:  Negative for diarrhea, nausea and vomiting.   Endocrine: Negative.    Genitourinary: Negative.    Musculoskeletal:  Negative for back pain, joint swelling, myalgias and neck pain.   Neurological:  Negative for dizziness, seizures, weakness, numbness and headaches.   Psychiatric/Behavioral: Negative.          Past Medical History:   Diagnosis Date    Brain tumor 1/12/2023    Seizures 1/12/2023       Objective:    There were no vitals filed for this visit.   Physical Exam  Constitutional:       General: He is not in acute distress.     Appearance: Normal appearance.   HENT:      Head: Normocephalic and atraumatic.   Pulmonary:      Effort: Pulmonary effort is normal.   Musculoskeletal:      Cervical back: Neck supple.   Neurological:      Mental Status: He is alert and oriented to person, place, and time.      GCS: GCS eye subscore is 4. GCS verbal subscore is 5. GCS motor subscore is 6.      Cranial Nerves: No cranial nerve deficit.       IMAGING:  MRI BRAIN W WO CONTRAST 7/12/24:  1. 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  The amount of abnormal T2 signal is similar.  2. Similar appearance of several subcentimeter non enhancing abnormal signal foci separate from the resection cavity in the left frontal lobe and also in the left parietal and right frontal lobes that are nonspecific but are statistically most likely white matter microvascular ischemic changes are focal gliosis given the patient's age.    I have personally reviewed the images with the pt.      I, Dr. Wellington Park, personally performed the services  described in this documentation. All medical record entries made by the scribe, Alex Spencer, were at my direction and in my presence.  I have reviewed the chart and agree that the record reflects my personal performance and is accurate and complete. Wellington Park MD. 07/16/2024    Assessment:       Astrocytoma.      Plan:   I have personally reviewed the MRI BRAIN W WO CONTRAST with the pt which shows:  1. 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  The amount of abnormal T2 signal is similar.  2. Similar appearance of several subcentimeter non enhancing abnormal signal foci separate from the resection cavity in the left frontal lobe and also in the left parietal and right frontal lobes that are nonspecific but are statistically most likely white matter microvascular ischemic changes are focal gliosis given the patient's age.    I will schedule the patient for f/u visit in 3mo with MRI brain W WO contrast.

## 2024-07-22 LAB
ALBUMIN SERPL BCP-MCNC: 3.8 G/DL (ref 3.4–5)
ALBUMIN/GLOBULIN RATIO: 1.08 RATIO (ref 1.1–1.8)
ALP SERPL-CCNC: 73 U/L (ref 46–116)
ALT SERPL W P-5'-P-CCNC: 31 U/L (ref 12–78)
ANION GAP SERPL CALC-SCNC: 8 MMOL/L (ref 3–11)
AST SERPL-CCNC: 15 U/L (ref 15–37)
BANDS: 1 % (ref 0–5)
BILIRUB SERPL-MCNC: 0.3 MG/DL (ref 0–1)
BUN SERPL-MCNC: 17 MG/DL (ref 7–18)
BUN/CREAT SERPL: 15.88 RATIO (ref 7–18)
CALCIUM SERPL-MCNC: 8.6 MG/DL (ref 8.8–10.5)
CELLS COUNTED: 100
CHLORIDE SERPL-SCNC: 101 MMOL/L (ref 100–108)
CO2 SERPL-SCNC: 26 MMOL/L (ref 21–32)
CREAT SERPL-MCNC: 1.07 MG/DL (ref 0.7–1.3)
EOSINOPHIL NFR BLD: 1 % (ref 1–3)
ERYTHROCYTE [DISTWIDTH] IN BLOOD BY AUTOMATED COUNT: 13.4 % (ref 12.5–18)
GFR ESTIMATION: > 60
GLOBULIN: 3.5 G/DL (ref 2.3–3.5)
GLUCOSE SERPL-MCNC: 106 MG/DL (ref 70–110)
HCT VFR BLD AUTO: 41.5 % (ref 42–52)
HGB BLD-MCNC: 14.2 G/DL (ref 14–18)
LYMPHOCYTES NFR BLD: 13 % (ref 25–40)
MCH RBC QN AUTO: 28.3 PG (ref 27–31.2)
MCHC RBC AUTO-ENTMCNC: 34.2 G/DL (ref 31.8–35.4)
MCV RBC AUTO: 82.7 FL (ref 80–97)
MONOCYTES NFR BLD: 10 % (ref 1–15)
NEUTROPHILS # BLD AUTO: 4.3 10*3/UL (ref 1.8–7.7)
NEUTROPHILS NFR BLD: 75 % (ref 37–80)
NUCLEATED RED BLOOD CELLS: 0 %
PLATELETS: 320 10*3/UL (ref 142–424)
POTASSIUM SERPL-SCNC: 4.1 MMOL/L (ref 3.6–5.2)
PROT SERPL-MCNC: 7.3 G/DL (ref 6.4–8.2)
RBC # BLD AUTO: 5.02 10*6/UL (ref 4.7–6.1)
RBC MORPH BLD: ABNORMAL
SMALL PLATELETS BLD QL SMEAR: ADEQUATE
SODIUM BLD-SCNC: 135 MMOL/L (ref 135–145)
WBC # BLD: 5.7 10*3/UL (ref 4.6–10.2)

## 2024-07-23 ENCOUNTER — OFFICE VISIT (OUTPATIENT)
Dept: HEMATOLOGY/ONCOLOGY | Facility: CLINIC | Age: 49
End: 2024-07-23
Payer: COMMERCIAL

## 2024-07-23 VITALS
DIASTOLIC BLOOD PRESSURE: 70 MMHG | SYSTOLIC BLOOD PRESSURE: 109 MMHG | WEIGHT: 167.31 LBS | OXYGEN SATURATION: 98 % | HEIGHT: 70 IN | HEART RATE: 58 BPM | RESPIRATION RATE: 16 BRPM | BODY MASS INDEX: 23.95 KG/M2

## 2024-07-23 DIAGNOSIS — R11.2 CHEMOTHERAPY-INDUCED NAUSEA AND VOMITING: ICD-10-CM

## 2024-07-23 DIAGNOSIS — D63.0 ANEMIA IN NEOPLASTIC DISEASE: ICD-10-CM

## 2024-07-23 DIAGNOSIS — T45.1X5A CHEMOTHERAPY-INDUCED NAUSEA AND VOMITING: ICD-10-CM

## 2024-07-23 DIAGNOSIS — C71.1 MALIGNANT NEOPLASM OF FRONTAL LOBE: Primary | ICD-10-CM

## 2024-07-23 PROCEDURE — 3074F SYST BP LT 130 MM HG: CPT | Mod: CPTII,S$GLB,, | Performed by: INTERNAL MEDICINE

## 2024-07-23 PROCEDURE — 1159F MED LIST DOCD IN RCRD: CPT | Mod: CPTII,S$GLB,, | Performed by: INTERNAL MEDICINE

## 2024-07-23 PROCEDURE — 3078F DIAST BP <80 MM HG: CPT | Mod: CPTII,S$GLB,, | Performed by: INTERNAL MEDICINE

## 2024-07-23 PROCEDURE — 3008F BODY MASS INDEX DOCD: CPT | Mod: CPTII,S$GLB,, | Performed by: INTERNAL MEDICINE

## 2024-07-23 PROCEDURE — 99214 OFFICE O/P EST MOD 30 MIN: CPT | Mod: S$GLB,,, | Performed by: INTERNAL MEDICINE

## 2024-07-23 NOTE — PROGRESS NOTES
MEDICAL ONCOLOGY FOLLOW UP CONSULTATION NOTE    Patient ID: Marvin Pool is a 48 y.o. male.    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  He also completed chemo XRT and now is currently on adjuvant Temodar.  He would like to establish care here locally in [place] and voices no acute complaints reporting that he is tolerated Temodar without any side effects at this time.  He presents to our clinic today for further evaluation        Imaging:       MRI brain:  01/12/23    Expected immediate postsurgical changes of left frontal craniotomy for frontal mass resection.     Persistent area of T2 FLAIR hyperintense signal surrounding the resection is similar size compared to preoperative MRI. Typical thin peripheral enhancement around the resection cavity.        Past Medical History:   Diagnosis Date    Brain tumor 1/12/2023    Seizures 1/12/2023     Family History   Problem Relation Name Age of Onset    No Known Problems Mother      No Known Problems Father      No Known Problems Sister      No Known Problems Brother      No Known Problems Maternal Aunt       Social History     Socioeconomic History    Marital status:    Tobacco Use    Smoking status: Never    Smokeless tobacco: Never   Substance and Sexual Activity    Alcohol use: Not Currently     Social Determinants of Health     Financial Resource Strain: Low Risk  (11/16/2023)    Overall Financial Resource Strain (CARDIA)     Difficulty of Paying Living Expenses: Not very hard   Food Insecurity: No Food Insecurity (11/16/2023)    Hunger Vital Sign     Worried About Running Out of Food in the Last Year: Never true     Ran Out of Food in the Last Year: Never true   Transportation Needs: No Transportation Needs (11/16/2023)    PRAPARE - Transportation     Lack of Transportation  (Medical): No     Lack of Transportation (Non-Medical): No   Physical Activity: Sufficiently Active (11/16/2023)    Exercise Vital Sign     Days of Exercise per Week: 6 days     Minutes of Exercise per Session: 120 min   Stress: Stress Concern Present (11/16/2023)    [facility] of Occupational Health - Occupational Stress Questionnaire     Feeling of Stress : Rather much   Housing Stability: High Risk (11/16/2023)    Housing Stability Vital Sign     Unable to Pay for Housing in the Last Year: Yes     Number of Places Lived in the Last Year: 1     Unstable Housing in the Last Year: No     Past Surgical History:   Procedure Laterality Date    CRANIOTOMY USING FRAMELESS STEREOTAXY Left 1/12/2023    Procedure: CRANIOTOMY, USING FRAMELESS STEREOTAXY;  Surgeon: Wellington Park MD;  Location: [facility] OR [address] [place] [address];  Service: Neurosurgery;  Laterality: Left;         Review of systems:  Review of Systems   Constitutional:  Negative for activity change, appetite change, chills, diaphoresis, fatigue and unexpected weight change.   HENT:  Negative for congestion, facial swelling, hearing loss, mouth sores, trouble swallowing and voice change.    Eyes:  Negative for photophobia, pain, discharge and itching.   Respiratory:  Negative for apnea, cough, choking, chest tightness and shortness of breath.    Cardiovascular:  Negative for chest pain, palpitations and leg swelling.   Gastrointestinal:  Negative for abdominal distention, abdominal pain, anal bleeding and blood in stool.   Endocrine: Negative for cold intolerance, heat intolerance, polydipsia and polyphagia.   Genitourinary:  Negative for difficulty urinating, dysuria, flank pain and hematuria.   Musculoskeletal:  Negative for arthralgias, back pain, joint swelling, myalgias, neck pain and neck stiffness.   Skin:  Negative for color change, pallor and wound.   Allergic/Immunologic: Negative for environmental allergies, food allergies and immunocompromised state.    Neurological:  Negative for dizziness, seizures, facial asymmetry, speech difficulty, light-headedness, numbness and headaches.   Hematological:  Negative for adenopathy. Does not bruise/bleed easily.   Psychiatric/Behavioral:  Negative for agitation, behavioral problems, confusion, decreased concentration and sleep disturbance.            Physical Exam  Vitals and nursing note reviewed.   Constitutional:       General: He is not in acute distress.     Appearance: Normal appearance. He is not ill-appearing.   HENT:      Head: Normocephalic and atraumatic.      Nose: No congestion or rhinorrhea.   Eyes:      General: No scleral icterus.     Extraocular Movements: Extraocular movements intact.      Pupils: Pupils are equal, round, and reactive to light.   Cardiovascular:      Rate and Rhythm: Normal rate and regular rhythm.      Pulses: Normal pulses.      Heart sounds: Normal heart sounds. No murmur heard.     No gallop.   Pulmonary:      Effort: Pulmonary effort is normal. No respiratory distress.      Breath sounds: Normal breath sounds. No stridor. No wheezing or rhonchi.   Abdominal:      General: Bowel sounds are normal. There is no distension.      Palpations: There is no mass.      Tenderness: There is no abdominal tenderness. There is no guarding.   Musculoskeletal:         General: No swelling, tenderness, deformity or signs of injury. Normal range of motion.      Cervical back: Normal range of motion and neck supple. No rigidity. No muscular tenderness.      Right lower leg: No edema.      Left lower leg: No edema.   Skin:     General: Skin is warm.      Coloration: Skin is not jaundiced or pale.      Findings: No bruising or lesion.   Neurological:      General: No focal deficit present.      Mental Status: He is alert and oriented to person, place, and time.      Cranial Nerves: No cranial nerve deficit.      Sensory: No sensory deficit.      Motor: No weakness.      Gait: Gait normal.   Psychiatric:          Mood and Affect: Mood normal.         Behavior: Behavior normal.         Thought Content: Thought content normal.                 Vitals:    07/23/24 0930   BP: 109/70   Pulse: (!) 58   Resp: 16           Body surface area is 1.94 meters squared.    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   == s/p Chemo-XRT completed recently and now on adjuvant Temodar tolerating well. I will add Bactrim DS for PCP prohylaxis at this time and see patient prior to C#2  == 7/10/23: Labs reviewed. Tolerating Bactrim DS well with no dose limiting toxicities. Voices no complaints. Next MRI brain in Aug 2023  ==8/07/23: First cycle of adjuvant Temozolomide well tolerated, patient has some mild nausea relieved with phenergan prn. He is not taking ondansetron states phenergan works better.  Temozolomide dosage is 390mg days 1-5 q 28 days.  Reports good compliance with bactrim for pcp prophylaxis. MRI Brain reviewed Next MRI brain is scheduled November 6th 2023.  No headaches, visual changes. Good compliance with Keppra, will check keppra level with labs.  ==09/05/23: Patient is s/p 2 cycles of adjuvant Temozolomide, has intermittent nausea relieved with phenergan.  No s/s infection, is on bactrim for pcp prophylaxis.  Plan to continue adjuvant Temozolomide 6-12 cycles with MRI brain on 11/6/23 has set up per radiation oncology. Keppra level not drawn, will add to orders  ==10/09/23: Patient doing well, recently saw neurosurgery with MRI brain which was noted to have continued stable residual disease.  Temozolomide well tolerated, reports occasional nausea relieved with ondansetron.  Will continue with Temozolomide, patient is cleared to start cycle 4 with 6-12 planned adjuvant cycles. Next MRI brain scheduled by neurosurgery in 4 months  ==11/6/23: Patient due to start cycle 5 adjuvant  Temozolomide.  Tolerating well.  ==12/20/23: MRI brain 12/4/23 shows stable disease with post radiation changes. Stable T2 hyperintense signal at the operative cavity which may be a combination of tumor and or gliosis.  ==01/17/2024: Pt due for cycle 7 adjuvant Temozolomide.  He reports some headaches, mild expressive aphasia and stuttering intermittent for about last month.  He has MRI scheduled on 2/26/2024 scheduled by Dr. Park, patient's neurosurgeon.  I sent a message to Dr. Park regarding MRI to see if he would like to move it up.   ==01/18/2024 Addendum: message received from Dr. Park, neurosurgeon.  He will have his office move up patient's MRI to be completed in [facility].  His office will contact patient  ==03/21/24: Pt is s/p cycle 8 adjuvant temozolomide. He recently had MRI and appt with Dr. Park, neurosurgeon which showed no evidence of recurrence or progression.  He was also having some difficulty voiding and urinary retention, saw Dr. Garay who performed a cystoscope which was normal. He has been unable to get Temodar for the last 2-3 weeks citing insurance and logistics issue. I will have my staff help him out in this regard.  == 5/27/24: Continuing with Temodar. Voices no acute issues. Last MRI brain April 2024 shows completely stable postop changes and no post radiation change with no evidence of tumor progression. Will continue with 12 cycles.  ==07/23/24: Tolerating temodar well. MRI brain for restaging done by Dr Park shows some response to Temodar. Plan to continue with Temodar for another 3 months. Restaging scans being ordered by Dr Park.    2. Anemia  ==resolved    3. Nasal congestion  ==Resolved    Plan:  Continue Temodar for 3 additional months per recommendation from Neurosurgery  Continue Bactrim DS for PCP prophylaxis  RTC  in 4 weeks for MD visit with labs: CBC, CMP prior     Future Appointments   Date Time Provider Department Center   7/23/2024  9:40 AM John Graves MD [facility]   [facility]   10/21/2024 10:00 AM [facility] MRI1 450 LB LIMIT [facility] MRI [facility]   10/22/2024  9:00 AM Wellington Park MD [facility] NEUROS [facility]ah Soto           Total time spent in counseling and discussion about further management options including relevant lab work, treatment,  prognosis, medications and intended side effects was more than 30 minutes. More than 50% of the time was spent on counseling and coordination of care.  I spent a total of 30 minutes on the day of the visit.This includes face to face time and non-face to face time preparing to see the patient (eg, review of tests), Obtaining and/or reviewing separately obtained history, Documenting clinical information in the electronic or other health record, Independently interpreting resultsand communicating results to the patient/family/caregiver, or Care coordination.

## 2024-07-25 DIAGNOSIS — C71.1 MALIGNANT NEOPLASM OF FRONTAL LOBE: Chronic | ICD-10-CM

## 2024-07-26 RX ORDER — SULFAMETHOXAZOLE AND TRIMETHOPRIM 800; 160 MG/1; MG/1
1 TABLET ORAL DAILY
Qty: 30 TABLET | Refills: 0 | Status: SHIPPED | OUTPATIENT
Start: 2024-07-26

## 2024-08-12 DIAGNOSIS — C71.1 MALIGNANT NEOPLASM OF FRONTAL LOBE: Chronic | ICD-10-CM

## 2024-08-12 RX ORDER — SULFAMETHOXAZOLE AND TRIMETHOPRIM 800; 160 MG/1; MG/1
1 TABLET ORAL DAILY
Qty: 30 TABLET | Refills: 0 | Status: SHIPPED | OUTPATIENT
Start: 2024-08-12

## 2024-08-13 ENCOUNTER — PATIENT MESSAGE (OUTPATIENT)
Dept: HEMATOLOGY/ONCOLOGY | Facility: CLINIC | Age: 49
End: 2024-08-13
Payer: COMMERCIAL

## 2024-08-19 LAB
ALBUMIN SERPL BCP-MCNC: 3.9 G/DL (ref 3.4–5)
ALBUMIN/GLOBULIN RATIO: 1 RATIO (ref 1.1–1.8)
ALP SERPL-CCNC: 78 U/L (ref 46–116)
ALT SERPL W P-5'-P-CCNC: 24 U/L (ref 12–78)
ANION GAP SERPL CALC-SCNC: 11 MMOL/L (ref 3–11)
AST SERPL-CCNC: 21 U/L (ref 15–37)
BASOPHILS NFR BLD: 0.5 % (ref 0–3)
BILIRUB SERPL-MCNC: 0.3 MG/DL (ref 0–1)
BUN SERPL-MCNC: 14 MG/DL (ref 7–18)
BUN/CREAT SERPL: 16.09 RATIO (ref 7–18)
CALCIUM SERPL-MCNC: 8.7 MG/DL (ref 8.8–10.5)
CHLORIDE SERPL-SCNC: 102 MMOL/L (ref 100–108)
CO2 SERPL-SCNC: 24 MMOL/L (ref 21–32)
CREAT SERPL-MCNC: 0.87 MG/DL (ref 0.7–1.3)
EOSINOPHIL NFR BLD: 2 % (ref 1–3)
ERYTHROCYTE [DISTWIDTH] IN BLOOD BY AUTOMATED COUNT: 13.5 % (ref 12.5–18)
GFR ESTIMATION: > 60
GLOBULIN: 3.9 G/DL (ref 2.3–3.5)
GLUCOSE SERPL-MCNC: 84 MG/DL (ref 70–110)
HCT VFR BLD AUTO: 45.7 % (ref 42–52)
HGB BLD-MCNC: 15 G/DL (ref 14–18)
LYMPHOCYTES NFR BLD: 10.5 % (ref 25–40)
MCH RBC QN AUTO: 28.5 PG (ref 27–31.2)
MCHC RBC AUTO-ENTMCNC: 32.8 G/DL (ref 31.8–35.4)
MCV RBC AUTO: 86.9 FL (ref 80–97)
MONOCYTES NFR BLD: 7.6 % (ref 1–15)
NEUTROPHILS # BLD AUTO: 4.76 10*3/UL (ref 1.8–7.7)
NEUTROPHILS NFR BLD: 79.1 % (ref 37–80)
NUCLEATED RED BLOOD CELLS: 0 %
PLATELETS: 392 10*3/UL (ref 142–424)
POTASSIUM SERPL-SCNC: 4.1 MMOL/L (ref 3.6–5.2)
PROT SERPL-MCNC: 7.8 G/DL (ref 6.4–8.2)
RBC # BLD AUTO: 5.26 10*6/UL (ref 4.7–6.1)
SODIUM BLD-SCNC: 137 MMOL/L (ref 135–145)
WBC # BLD: 6 10*3/UL (ref 4.6–10.2)

## 2024-08-20 ENCOUNTER — OFFICE VISIT (OUTPATIENT)
Dept: HEMATOLOGY/ONCOLOGY | Facility: CLINIC | Age: 49
End: 2024-08-20
Payer: COMMERCIAL

## 2024-08-20 VITALS
BODY MASS INDEX: 24.07 KG/M2 | OXYGEN SATURATION: 94 % | HEIGHT: 70 IN | HEART RATE: 75 BPM | DIASTOLIC BLOOD PRESSURE: 72 MMHG | WEIGHT: 168.13 LBS | SYSTOLIC BLOOD PRESSURE: 114 MMHG | RESPIRATION RATE: 16 BRPM

## 2024-08-20 DIAGNOSIS — R11.2 CHEMOTHERAPY-INDUCED NAUSEA AND VOMITING: ICD-10-CM

## 2024-08-20 DIAGNOSIS — T45.1X5A CHEMOTHERAPY-INDUCED NAUSEA AND VOMITING: ICD-10-CM

## 2024-08-20 DIAGNOSIS — C71.9 GLIOMA: ICD-10-CM

## 2024-08-20 DIAGNOSIS — C71.1 MALIGNANT NEOPLASM OF FRONTAL LOBE: Primary | ICD-10-CM

## 2024-08-20 NOTE — PROGRESS NOTES
MEDICAL ONCOLOGY FOLLOW UP CONSULTATION NOTE    Patient ID: Marvin Pool is a 48 y.o. male.    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  He also completed chemo XRT and now is currently on adjuvant Temodar.  He would like to establish care here locally in [place] and voices no acute complaints reporting that he is tolerated Temodar without any side effects at this time.  He presents to our clinic today for further evaluation        Imaging:       MRI brain:  01/12/23    Expected immediate postsurgical changes of left frontal craniotomy for frontal mass resection.     Persistent area of T2 FLAIR hyperintense signal surrounding the resection is similar size compared to preoperative MRI. Typical thin peripheral enhancement around the resection cavity.        Past Medical History:   Diagnosis Date    Brain tumor 1/12/2023    Seizures 1/12/2023     Family History   Problem Relation Name Age of Onset    No Known Problems Mother      No Known Problems Father      No Known Problems Sister      No Known Problems Brother      No Known Problems Maternal Aunt       Social History     Socioeconomic History    Marital status:    Tobacco Use    Smoking status: Never    Smokeless tobacco: Never   Substance and Sexual Activity    Alcohol use: Not Currently     Social Determinants of Health     Financial Resource Strain: Low Risk  (11/16/2023)    Overall Financial Resource Strain (CARDIA)     Difficulty of Paying Living Expenses: Not very hard   Food Insecurity: No Food Insecurity (11/16/2023)    Hunger Vital Sign     Worried About Running Out of Food in the Last Year: Never true     Ran Out of Food in the Last Year: Never true   Transportation Needs: No Transportation Needs (11/16/2023)    PRAPARE - Transportation     Lack of Transportation  (Medical): No     Lack of Transportation (Non-Medical): No   Physical Activity: Sufficiently Active (11/16/2023)    Exercise Vital Sign     Days of Exercise per Week: 6 days     Minutes of Exercise per Session: 120 min   Stress: Stress Concern Present (11/16/2023)    [facility] of Occupational Health - Occupational Stress Questionnaire     Feeling of Stress : Rather much   Housing Stability: High Risk (11/16/2023)    Housing Stability Vital Sign     Unable to Pay for Housing in the Last Year: Yes     Number of Places Lived in the Last Year: 1     Unstable Housing in the Last Year: No     Past Surgical History:   Procedure Laterality Date    CRANIOTOMY USING FRAMELESS STEREOTAXY Left 1/12/2023    Procedure: CRANIOTOMY, USING FRAMELESS STEREOTAXY;  Surgeon: Wellington Park MD;  Location: [facility] OR [address] [place] [address];  Service: Neurosurgery;  Laterality: Left;         Review of systems:  Review of Systems   Constitutional:  Negative for activity change, appetite change, chills, diaphoresis, fatigue and unexpected weight change.   HENT:  Negative for congestion, facial swelling, hearing loss, mouth sores, trouble swallowing and voice change.    Eyes:  Negative for photophobia, pain, discharge and itching.   Respiratory:  Negative for apnea, cough, choking, chest tightness and shortness of breath.    Cardiovascular:  Negative for chest pain, palpitations and leg swelling.   Gastrointestinal:  Negative for abdominal distention, abdominal pain, anal bleeding and blood in stool.   Endocrine: Negative for cold intolerance, heat intolerance, polydipsia and polyphagia.   Genitourinary:  Negative for difficulty urinating, dysuria, flank pain and hematuria.   Musculoskeletal:  Negative for arthralgias, back pain, joint swelling, myalgias, neck pain and neck stiffness.   Skin:  Negative for color change, pallor and wound.   Allergic/Immunologic: Negative for environmental allergies, food allergies and immunocompromised state.    Neurological:  Negative for dizziness, seizures, facial asymmetry, speech difficulty, light-headedness, numbness and headaches.   Hematological:  Negative for adenopathy. Does not bruise/bleed easily.   Psychiatric/Behavioral:  Negative for agitation, behavioral problems, confusion, decreased concentration and sleep disturbance.            Physical Exam  Vitals and nursing note reviewed.   Constitutional:       General: He is not in acute distress.     Appearance: Normal appearance. He is not ill-appearing.   HENT:      Head: Normocephalic and atraumatic.      Nose: No congestion or rhinorrhea.   Eyes:      General: No scleral icterus.     Extraocular Movements: Extraocular movements intact.      Pupils: Pupils are equal, round, and reactive to light.   Cardiovascular:      Rate and Rhythm: Normal rate and regular rhythm.      Pulses: Normal pulses.      Heart sounds: Normal heart sounds. No murmur heard.     No gallop.   Pulmonary:      Effort: Pulmonary effort is normal. No respiratory distress.      Breath sounds: Normal breath sounds. No stridor. No wheezing or rhonchi.   Abdominal:      General: Bowel sounds are normal. There is no distension.      Palpations: There is no mass.      Tenderness: There is no abdominal tenderness. There is no guarding.   Musculoskeletal:         General: No swelling, tenderness, deformity or signs of injury. Normal range of motion.      Cervical back: Normal range of motion and neck supple. No rigidity. No muscular tenderness.      Right lower leg: No edema.      Left lower leg: No edema.   Skin:     General: Skin is warm.      Coloration: Skin is not jaundiced or pale.      Findings: No bruising or lesion.   Neurological:      General: No focal deficit present.      Mental Status: He is alert and oriented to person, place, and time.      Cranial Nerves: No cranial nerve deficit.      Sensory: No sensory deficit.      Motor: No weakness.      Gait: Gait normal.   Psychiatric:          Mood and Affect: Mood normal.         Behavior: Behavior normal.         Thought Content: Thought content normal.                 Vitals:    08/20/24 1035   BP: 114/72   Pulse: 75   Resp: 16           Body surface area is 1.94 meters squared.    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   == s/p Chemo-XRT completed recently and now on adjuvant Temodar tolerating well. I will add Bactrim DS for PCP prohylaxis at this time and see patient prior to C#2  == 7/10/23: Labs reviewed. Tolerating Bactrim DS well with no dose limiting toxicities. Voices no complaints. Next MRI brain in Aug 2023  ==8/07/23: First cycle of adjuvant Temozolomide well tolerated, patient has some mild nausea relieved with phenergan prn. He is not taking ondansetron states phenergan works better.  Temozolomide dosage is 390mg days 1-5 q 28 days.  Reports good compliance with bactrim for pcp prophylaxis. MRI Brain reviewed Next MRI brain is scheduled November 6th 2023.  No headaches, visual changes. Good compliance with Keppra, will check keppra level with labs.  ==09/05/23: Patient is s/p 2 cycles of adjuvant Temozolomide, has intermittent nausea relieved with phenergan.  No s/s infection, is on bactrim for pcp prophylaxis.  Plan to continue adjuvant Temozolomide 6-12 cycles with MRI brain on 11/6/23 has set up per radiation oncology. Keppra level not drawn, will add to orders  ==10/09/23: Patient doing well, recently saw neurosurgery with MRI brain which was noted to have continued stable residual disease.  Temozolomide well tolerated, reports occasional nausea relieved with ondansetron.  Will continue with Temozolomide, patient is cleared to start cycle 4 with 6-12 planned adjuvant cycles. Next MRI brain scheduled by neurosurgery in 4 months  ==11/6/23: Patient due to start cycle 5 adjuvant  Temozolomide.  Tolerating well.  ==12/20/23: MRI brain 12/4/23 shows stable disease with post radiation changes. Stable T2 hyperintense signal at the operative cavity which may be a combination of tumor and or gliosis.  ==01/17/2024: Pt due for cycle 7 adjuvant Temozolomide.  He reports some headaches, mild expressive aphasia and stuttering intermittent for about last month.  He has MRI scheduled on 2/26/2024 scheduled by Dr. Park, patient's neurosurgeon.  I sent a message to Dr. Park regarding MRI to see if he would like to move it up.   ==01/18/2024 Addendum: message received from Dr. Park, neurosurgeon.  He will have his office move up patient's MRI to be completed in [facility].  His office will contact patient  ==03/21/24: Pt is s/p cycle 8 adjuvant temozolomide. He recently had MRI and appt with Dr. Park, neurosurgeon which showed no evidence of recurrence or progression.  He was also having some difficulty voiding and urinary retention, saw Dr. Garay who performed a cystoscope which was normal. He has been unable to get Temodar for the last 2-3 weeks citing insurance and logistics issue. I will have my staff help him out in this regard.  == 5/27/24: Continuing with Temodar. Voices no acute issues. Last MRI brain April 2024 shows completely stable postop changes and no post radiation change with no evidence of tumor progression. Will continue with 12 cycles.  ==08/22/24: Tolerating temodar well. MRI brain for restaging done by Dr Park shows some response to Temodar. Plan to continue with Temodar for another 2 months. Restaging scans being ordered by Dr Park.    2. Anemia  ==resolved    3. Nasal congestion  ==Resolved    Plan:  Continue Temodar for 2 additional months per recommendation from Neurosurgery  Continue Bactrim DS for PCP prophylaxis    RTC  in 4 weeks for MD visit with labs: CBC, CMP prior     Future Appointments   Date Time Provider Department Center   10/21/2024 10:00 AM [facility] MRI1 450 LB LIMIT [facility]  MRI [facility]   10/22/2024  9:00 AM Wellington Park MD [facility] NEUROS Jonah Soto           Total time spent in counseling and discussion about further management options including relevant lab work, treatment,  prognosis, medications and intended side effects was more than 30 minutes. More than 50% of the time was spent on counseling and coordination of care.  I spent a total of 30 minutes on the day of the visit.This includes face to face time and non-face to face time preparing to see the patient (eg, review of tests), Obtaining and/or reviewing separately obtained history, Documenting clinical information in the electronic or other health record, Independently interpreting resultsand communicating results to the patient/family/caregiver, or Care coordination.

## 2024-09-10 ENCOUNTER — OFFICE VISIT (OUTPATIENT)
Dept: UROLOGY | Facility: CLINIC | Age: 49
End: 2024-09-10
Payer: COMMERCIAL

## 2024-09-10 VITALS
WEIGHT: 165 LBS | DIASTOLIC BLOOD PRESSURE: 66 MMHG | SYSTOLIC BLOOD PRESSURE: 124 MMHG | HEART RATE: 73 BPM | BODY MASS INDEX: 23.62 KG/M2 | HEIGHT: 70 IN

## 2024-09-10 DIAGNOSIS — N52.9 ERECTILE DYSFUNCTION, UNSPECIFIED ERECTILE DYSFUNCTION TYPE: Primary | ICD-10-CM

## 2024-09-10 PROCEDURE — 3008F BODY MASS INDEX DOCD: CPT | Mod: CPTII,S$GLB,, | Performed by: UROLOGY

## 2024-09-10 PROCEDURE — 1159F MED LIST DOCD IN RCRD: CPT | Mod: CPTII,S$GLB,, | Performed by: UROLOGY

## 2024-09-10 PROCEDURE — 1160F RVW MEDS BY RX/DR IN RCRD: CPT | Mod: CPTII,S$GLB,, | Performed by: UROLOGY

## 2024-09-10 PROCEDURE — 99214 OFFICE O/P EST MOD 30 MIN: CPT | Mod: S$GLB,,, | Performed by: UROLOGY

## 2024-09-10 PROCEDURE — 3074F SYST BP LT 130 MM HG: CPT | Mod: CPTII,S$GLB,, | Performed by: UROLOGY

## 2024-09-10 PROCEDURE — 3078F DIAST BP <80 MM HG: CPT | Mod: CPTII,S$GLB,, | Performed by: UROLOGY

## 2024-09-10 RX ORDER — TRAZODONE HYDROCHLORIDE 50 MG/1
TABLET ORAL
COMMUNITY

## 2024-09-10 RX ORDER — BUPROPION HYDROCHLORIDE 300 MG/1
300 TABLET ORAL EVERY MORNING
COMMUNITY
Start: 2024-08-08

## 2024-09-10 RX ORDER — TERBINAFINE HYDROCHLORIDE 250 MG/1
250 TABLET ORAL
COMMUNITY
Start: 2024-08-14

## 2024-09-10 RX ORDER — SILDENAFIL 100 MG/1
100 TABLET, FILM COATED ORAL DAILY PRN
Qty: 30 TABLET | Refills: 11 | Status: SHIPPED | OUTPATIENT
Start: 2024-09-10 | End: 2025-09-10

## 2024-09-10 NOTE — PROGRESS NOTES
Subjective:       Patient ID: Marvin Pool is a 48 y.o. male.    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  He has not tried any intervention for any of this    Past Medical History:   Past Medical History:   Diagnosis Date    Brain tumor 1/12/2023    Seizures 1/12/2023       Past Surgical Historical:   Past Surgical History:   Procedure Laterality Date    CRANIOTOMY USING FRAMELESS STEREOTAXY Left 1/12/2023    Procedure: CRANIOTOMY, USING FRAMELESS STEREOTAXY;  Surgeon: Wellington Park MD;  Location: [facility] OR [address] [place] [address];  Service: Neurosurgery;  Laterality: Left;        Medications:   Medication List with Changes/Refills   New Medications    SILDENAFIL (VIAGRA) 100 MG TABLET    Take 1 tablet (100 mg total) by mouth daily as needed for Erectile Dysfunction.   Current Medications    ACETAMINOPHEN (TYLENOL) 500 MG TABLET    Take 1,000 mg by mouth every 6 (six) hours as needed for Pain.    BUPROPION (WELLBUTRIN XL) 300 MG 24 HR TABLET    Take 300 mg by mouth every morning.    FINASTERIDE (PROSCAR) 5 MG TABLET    Take 1 tablet (5 mg total) by mouth once daily.    KLONOPIN 0.5 MG TABLET    Take 0.5 mg by mouth daily as needed.    LEVETIRACETAM (KEPPRA) 500 MG TAB    Take 1 tablet (500 mg total) by mouth 2 (two) times daily.    OMEPRAZOLE (PRILOSEC) 40 MG CAPSULE    Take 40 mg by mouth every morning.    PROMETHAZINE (PHENERGAN) 25 MG TABLET    Take 25 mg by mouth every 6 (six) hours as needed.    SULFAMETHOXAZOLE-TRIMETHOPRIM 800-160MG (BACTRIM DS) 800-160 MG TAB    Take 1 tablet by mouth once daily    TAMSULOSIN (FLOMAX) 0.4 MG CAP    Take 2 capsules (0.8 mg total) by mouth once daily.    TEMOZOLOMIDE (TEMODAR) 140 MG CAPSULE    TAKE 1 CAPSULE  MG WITH 1 CAPSULE  MG ONCE DAILY ON DAYS 1 THROUGH 5 OF A 28 DAY CYCLE (TOTAL DOSE 390 MG).    TEMOZOLOMIDE  (TEMODAR) 250 MG CAPSULE    TAKE 1 CAPSULE  MG WITH 1 CAPSULE  MG (390 MG TOTAL) ONCE DAILY ON DAYS 1 THROUGH 5 EVERY 28 DAYS (TAKE ON AN EMPTY STOMACH).    TERBINAFINE HCL (LAMISIL) 250 MG TABLET    Take 250 mg by mouth.    TRAZODONE (DESYREL) 50 MG TABLET    TAKE 1/2 TO 1 (ONE-HALF TO ONE) TABLET BY MOUTH AT BEDTIME    ZOLPIDEM (AMBIEN) 10 MG TAB    Take 10 mg by mouth nightly as needed.        Past Social History:   Social History     Socioeconomic History    Marital status:    Tobacco Use    Smoking status: Never    Smokeless tobacco: Never   Substance and Sexual Activity    Alcohol use: Not Currently     Social Determinants of Health     Financial Resource Strain: Low Risk  (11/16/2023)    Overall Financial Resource Strain (CARDIA)     Difficulty of Paying Living Expenses: Not very hard   Food Insecurity: No Food Insecurity (11/16/2023)    Hunger Vital Sign     Worried About Running Out of Food in the Last Year: Never true     Ran Out of Food in the Last Year: Never true   Transportation Needs: No Transportation Needs (11/16/2023)    PRAPARE - Transportation     Lack of Transportation (Medical): No     Lack of Transportation (Non-Medical): No   Physical Activity: Sufficiently Active (11/16/2023)    Exercise Vital Sign     Days of Exercise per Week: 6 days     Minutes of Exercise per Session: 120 min   Stress: Stress Concern Present (11/16/2023)    [employer] [place] [employer] - Occupational Stress Questionnaire     Feeling of Stress : Rather much   Housing Stability: High Risk (11/16/2023)    Housing Stability Vital Sign     Unable to Pay for Housing in the Last Year: Yes     Number of Places Lived in the Last Year: 1     Unstable Housing in the Last Year: No       Allergies: Review of patient's allergies indicates:  No Known Allergies     Family History:   Family History   Problem Relation Name Age of Onset    No Known Problems Mother      No Known Problems Father      No Known  Problems Sister      No Known Problems Brother      No Known Problems Maternal Aunt          Review of Systems:  Review of Systems   Constitutional:  Negative for activity change and appetite change.   HENT:  Negative for congestion and dental problem.    Eyes:  Negative for visual disturbance.   Respiratory:  Negative for chest tightness and shortness of breath.    Cardiovascular:  Negative for chest pain.   Gastrointestinal:  Negative for abdominal distention and abdominal pain.   Genitourinary:  Negative for decreased urine volume, difficulty urinating, dysuria, enuresis, flank pain, frequency, genital sores, hematuria, penile discharge, penile pain, penile swelling, scrotal swelling, testicular pain and urgency.   Musculoskeletal:  Negative for back pain and neck pain.   Skin:  Negative for color change.   Neurological:  Negative for dizziness.   Hematological:  Negative for adenopathy.   Psychiatric/Behavioral:  Negative for agitation, behavioral problems and confusion.        Physical Exam:  Physical Exam  Constitutional:       General: He is not in acute distress.     Appearance: He is well-developed.   HENT:      Head: Normocephalic and atraumatic.      Nose: Nose normal.   Eyes:      General: No scleral icterus.     Conjunctiva/sclera: Conjunctivae normal.      Pupils: Pupils are equal, round, and reactive to light.   Neck:      Thyroid: No thyromegaly.      Trachea: No tracheal deviation.   Cardiovascular:      Rate and Rhythm: Normal rate and regular rhythm.      Heart sounds: Normal heart sounds.   Pulmonary:      Effort: Pulmonary effort is normal. No respiratory distress.      Breath sounds: Normal breath sounds. No wheezing or rales.   Abdominal:      General: Bowel sounds are normal. There is no distension.      Palpations: Abdomen is soft.      Tenderness: There is no abdominal tenderness. There is no guarding or rebound.   Genitourinary:     Penis: Normal. No tenderness.       Prostate: Normal.    Musculoskeletal:         General: No deformity. Normal range of motion.      Cervical back: Neck supple.   Lymphadenopathy:      Cervical: No cervical adenopathy.   Skin:     General: Skin is warm and dry.      Findings: No erythema or rash.   Neurological:      Mental Status: He is alert and oriented to person, place, and time.      Cranial Nerves: No cranial nerve deficit.   Psychiatric:         Behavior: Behavior normal.         Assessment/Plan:       Problem List Items Addressed This Visit    None  Visit Diagnoses       Erectile dysfunction, unspecified erectile dysfunction type    -  Primary               25 degree ventral curvature downward.  We had a long discussion about Peyronie's disease why it happens how it happens in the treatment for it unfortunately he has a ventral curvature and this is contraindicated for Xiaflex due to the proximity of the urethra to the lesion I also I am unable to palpate his plaque there was no identifiable no identifiable plaque this point I encouraged patient to attempt to live with what he has got until it becomes over burden some which point really the treatment unfortunately would be a penile implant also offered to refer him to another physician for 2nd opinion

## 2024-09-16 LAB
ALBUMIN SERPL BCP-MCNC: 3.9 G/DL (ref 3.4–5)
ALBUMIN/GLOBULIN RATIO: 1.08 RATIO (ref 1.1–1.8)
ALP SERPL-CCNC: 70 U/L (ref 46–116)
ALT SERPL W P-5'-P-CCNC: 27 U/L (ref 12–78)
ANION GAP SERPL CALC-SCNC: 7 MMOL/L (ref 3–11)
AST SERPL-CCNC: 19 U/L (ref 15–37)
BASOPHILS NFR BLD: 0.6 % (ref 0–3)
BILIRUB SERPL-MCNC: 0.4 MG/DL (ref 0–1)
BUN SERPL-MCNC: 15 MG/DL (ref 7–18)
BUN/CREAT SERPL: 14.15 RATIO (ref 7–18)
CALCIUM SERPL-MCNC: 8.7 MG/DL (ref 8.8–10.5)
CHLORIDE SERPL-SCNC: 103 MMOL/L (ref 100–108)
CO2 SERPL-SCNC: 28 MMOL/L (ref 21–32)
CREAT SERPL-MCNC: 1.06 MG/DL (ref 0.7–1.3)
EOSINOPHIL NFR BLD: 2.4 % (ref 1–3)
ERYTHROCYTE [DISTWIDTH] IN BLOOD BY AUTOMATED COUNT: 13.2 % (ref 12.5–18)
GFR ESTIMATION: > 60
GLOBULIN: 3.6 G/DL (ref 2.3–3.5)
GLUCOSE SERPL-MCNC: 129 MG/DL (ref 70–110)
HCT VFR BLD AUTO: 40.2 % (ref 42–52)
HGB BLD-MCNC: 13.8 G/DL (ref 14–18)
LYMPHOCYTES NFR BLD: 10.2 % (ref 25–40)
MCH RBC QN AUTO: 29 PG (ref 27–31.2)
MCHC RBC AUTO-ENTMCNC: 34.3 G/DL (ref 31.8–35.4)
MCV RBC AUTO: 84.5 FL (ref 80–97)
MONOCYTES NFR BLD: 9.8 % (ref 1–15)
NEUTROPHILS # BLD AUTO: 3.82 10*3/UL (ref 1.8–7.7)
NEUTROPHILS NFR BLD: 76.6 % (ref 37–80)
NUCLEATED RED BLOOD CELLS: 0 %
PLATELETS: 322 10*3/UL (ref 142–424)
POTASSIUM SERPL-SCNC: 3.8 MMOL/L (ref 3.6–5.2)
PROT SERPL-MCNC: 7.5 G/DL (ref 6.4–8.2)
RBC # BLD AUTO: 4.76 10*6/UL (ref 4.7–6.1)
SODIUM BLD-SCNC: 138 MMOL/L (ref 135–145)
WBC # BLD: 5 10*3/UL (ref 4.6–10.2)

## 2024-09-17 ENCOUNTER — OFFICE VISIT (OUTPATIENT)
Dept: HEMATOLOGY/ONCOLOGY | Facility: CLINIC | Age: 49
End: 2024-09-17
Payer: COMMERCIAL

## 2024-09-17 VITALS
DIASTOLIC BLOOD PRESSURE: 68 MMHG | HEART RATE: 79 BPM | HEIGHT: 70 IN | SYSTOLIC BLOOD PRESSURE: 114 MMHG | OXYGEN SATURATION: 96 % | RESPIRATION RATE: 16 BRPM | WEIGHT: 169.19 LBS | BODY MASS INDEX: 24.22 KG/M2

## 2024-09-17 DIAGNOSIS — C71.1 MALIGNANT NEOPLASM OF FRONTAL LOBE: Primary | ICD-10-CM

## 2024-09-17 NOTE — PROGRESS NOTES
MEDICAL ONCOLOGY FOLLOW UP CONSULTATION NOTE    Patient ID: Marvin Pool is a 48 y.o. male.    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  He also completed chemo XRT and now is currently on adjuvant Temodar.  He would like to establish care here locally in [place] and voices no acute complaints reporting that he is tolerated Temodar without any side effects at this time.  He presents to our clinic today for further evaluation        Imaging:       MRI brain:  01/12/23    Expected immediate postsurgical changes of left frontal craniotomy for frontal mass resection.     Persistent area of T2 FLAIR hyperintense signal surrounding the resection is similar size compared to preoperative MRI. Typical thin peripheral enhancement around the resection cavity.        Past Medical History:   Diagnosis Date    Brain tumor 1/12/2023    Seizures 1/12/2023     Family History   Problem Relation Name Age of Onset    No Known Problems Mother      No Known Problems Father      No Known Problems Sister      No Known Problems Brother      No Known Problems Maternal Aunt       Social History     Socioeconomic History    Marital status:    Tobacco Use    Smoking status: Never    Smokeless tobacco: Never   Substance and Sexual Activity    Alcohol use: Not Currently     Social Determinants of Health     Financial Resource Strain: Low Risk  (11/16/2023)    Overall Financial Resource Strain (CARDIA)     Difficulty of Paying Living Expenses: Not very hard   Food Insecurity: No Food Insecurity (11/16/2023)    Hunger Vital Sign     Worried About Running Out of Food in the Last Year: Never true     Ran Out of Food in the Last Year: Never true   Transportation Needs: No Transportation Needs (11/16/2023)    PRAPARE - Transportation     Lack of Transportation  (Medical): No     Lack of Transportation (Non-Medical): No   Physical Activity: Sufficiently Active (11/16/2023)    Exercise Vital Sign     Days of Exercise per Week: 6 days     Minutes of Exercise per Session: 120 min   Stress: Stress Concern Present (11/16/2023)    [facility] - Occupational Stress Questionnaire     Feeling of Stress : Rather much   Housing Stability: High Risk (11/16/2023)    Housing Stability Vital Sign     Unable to Pay for Housing in the Last Year: Yes     Number of Places Lived in the Last Year: 1     Unstable Housing in the Last Year: No     Past Surgical History:   Procedure Laterality Date    CRANIOTOMY USING FRAMELESS STEREOTAXY Left 1/12/2023    Procedure: CRANIOTOMY, USING FRAMELESS STEREOTAXY;  Surgeon: Wellington Park MD;  Location: [facility] OR [address] [place] [address];  Service: Neurosurgery;  Laterality: Left;         Review of systems:  Review of Systems   Constitutional:  Negative for activity change, appetite change, chills, diaphoresis, fatigue and unexpected weight change.   HENT:  Negative for congestion, facial swelling, hearing loss, mouth sores, trouble swallowing and voice change.    Eyes:  Negative for photophobia, pain, discharge and itching.   Respiratory:  Negative for apnea, cough, choking, chest tightness and shortness of breath.    Cardiovascular:  Negative for chest pain, palpitations and leg swelling.   Gastrointestinal:  Negative for abdominal distention, abdominal pain, anal bleeding and blood in stool.   Endocrine: Negative for cold intolerance, heat intolerance, polydipsia and polyphagia.   Genitourinary:  Negative for difficulty urinating, dysuria, flank pain and hematuria.   Musculoskeletal:  Negative for arthralgias, back pain, joint swelling, myalgias, neck pain and neck stiffness.   Skin:  Negative for color change, pallor and wound.   Allergic/Immunologic: Negative for environmental allergies, food allergies and immunocompromised state.    Neurological:  Negative for dizziness, seizures, facial asymmetry, speech difficulty, light-headedness, numbness and headaches.   Hematological:  Negative for adenopathy. Does not bruise/bleed easily.   Psychiatric/Behavioral:  Negative for agitation, behavioral problems, confusion, decreased concentration and sleep disturbance.            Physical Exam  Vitals and nursing note reviewed.   Constitutional:       General: He is not in acute distress.     Appearance: Normal appearance. He is not ill-appearing.   HENT:      Head: Normocephalic and atraumatic.      Nose: No congestion or rhinorrhea.   Eyes:      General: No scleral icterus.     Extraocular Movements: Extraocular movements intact.      Pupils: Pupils are equal, round, and reactive to light.   Cardiovascular:      Rate and Rhythm: Normal rate and regular rhythm.      Pulses: Normal pulses.      Heart sounds: Normal heart sounds. No murmur heard.     No gallop.   Pulmonary:      Effort: Pulmonary effort is normal. No respiratory distress.      Breath sounds: Normal breath sounds. No stridor. No wheezing or rhonchi.   Abdominal:      General: Bowel sounds are normal. There is no distension.      Palpations: There is no mass.      Tenderness: There is no abdominal tenderness. There is no guarding.   Musculoskeletal:         General: No swelling, tenderness, deformity or signs of injury. Normal range of motion.      Cervical back: Normal range of motion and neck supple. No rigidity. No muscular tenderness.      Right lower leg: No edema.      Left lower leg: No edema.   Skin:     General: Skin is warm.      Coloration: Skin is not jaundiced or pale.      Findings: No bruising or lesion.   Neurological:      General: No focal deficit present.      Mental Status: He is alert and oriented to person, place, and time.      Cranial Nerves: No cranial nerve deficit.      Sensory: No sensory deficit.      Motor: No weakness.      Gait: Gait normal.   Psychiatric:          Mood and Affect: Mood normal.         Behavior: Behavior normal.         Thought Content: Thought content normal.                 Vitals:    09/17/24 1310   BP: 114/68   Pulse: 79   Resp: 16           Body surface area is 1.95 meters squared.    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   == s/p Chemo-XRT completed recently and now on adjuvant Temodar tolerating well. I will add Bactrim DS for PCP prohylaxis at this time and see patient prior to C#2  == 7/10/23: Labs reviewed. Tolerating Bactrim DS well with no dose limiting toxicities. Voices no complaints. Next MRI brain in Aug 2023  ==8/07/23: First cycle of adjuvant Temozolomide well tolerated, patient has some mild nausea relieved with phenergan prn. He is not taking ondansetron states phenergan works better.  Temozolomide dosage is 390mg days 1-5 q 28 days.  Reports good compliance with bactrim for pcp prophylaxis. MRI Brain reviewed Next MRI brain is scheduled November 6th 2023.  No headaches, visual changes. Good compliance with Keppra, will check keppra level with labs.  ==09/05/23: Patient is s/p 2 cycles of adjuvant Temozolomide, has intermittent nausea relieved with phenergan.  No s/s infection, is on bactrim for pcp prophylaxis.  Plan to continue adjuvant Temozolomide 6-12 cycles with MRI brain on 11/6/23 has set up per radiation oncology. Keppra level not drawn, will add to orders  ==10/09/23: Patient doing well, recently saw neurosurgery with MRI brain which was noted to have continued stable residual disease.  Temozolomide well tolerated, reports occasional nausea relieved with ondansetron.  Will continue with Temozolomide, patient is cleared to start cycle 4 with 6-12 planned adjuvant cycles. Next MRI brain scheduled by neurosurgery in 4 months  ==11/6/23: Patient due to start cycle 5 adjuvant  Temozolomide.  Tolerating well.  ==12/20/23: MRI brain 12/4/23 shows stable disease with post radiation changes. Stable T2 hyperintense signal at the operative cavity which may be a combination of tumor and or gliosis.  ==01/17/24: Pt due for cycle 7 adjuvant Temozolomide.  He reports some headaches, mild expressive aphasia and stuttering intermittent for about last month.  He has MRI scheduled on 2/26/2024 scheduled by Dr. Park, patient's neurosurgeon.  I sent a message to Dr. Park regarding MRI to see if he would like to move it up.   ==01/18/24 Addendum: message received from Dr. Park, neurosurgeon.  He will have his office move up patient's MRI to be completed in [facility].  His office will contact patient  ==03/21/24: Pt is s/p cycle 8 adjuvant temozolomide. He recently had MRI and appt with Dr. Park, neurosurgeon which showed no evidence of recurrence or progression.  He was also having some difficulty voiding and urinary retention, saw Dr. Garay who performed a cystoscope which was normal. He has been unable to get Temodar for the last 2-3 weeks citing insurance and logistics issue. I will have my staff help him out in this regard.  == 5/27/24: Continuing with Temodar. Voices no acute issues. Last MRI brain April 2024 shows completely stable postop changes and no post radiation change with no evidence of tumor progression. Will continue with 12 cycles.  ==09/17/24: Tolerating temodar well. Last MRI brain for restaging done by Dr Park showed some response to Temodar. Plan to continue with Temodar for now until next restaging scan. Restaging scans being ordered by Dr Park.    2. Anemia  == Mild. Will continue to monitor.     3. Nasal congestion  ==Resolved    Plan:  Continue Temodar for 2 additional months per recommendation from Neurosurgery  Continue Bactrim DS for PCP prophylaxis    RTC  in 4 weeks for MD visit with labs: CBC, CMP prior     Future Appointments   Date Time Provider Department Center   10/21/2024  10:00 AM [facility] MRI1 450 LB LIMIT [facility] MRI [place]   10/22/2024  9:00 AM Wellington Park MD [facility] NEUROSC [place] [place]           Total time spent in counseling and discussion about further management options including relevant lab work, treatment,  prognosis, medications and intended side effects was more than 30 minutes. More than 50% of the time was spent on counseling and coordination of care.  I spent a total of 30 minutes on the day of the visit.This includes face to face time and non-face to face time preparing to see the patient (eg, review of tests), Obtaining and/or reviewing separately obtained history, Documenting clinical information in the electronic or other health record, Independently interpreting resultsand communicating results to the patient/family/caregiver, or Care coordination.

## 2024-09-23 DIAGNOSIS — C71.9 GLIOMA: ICD-10-CM

## 2024-09-23 RX ORDER — TEMOZOLOMIDE 140 MG/1
CAPSULE ORAL
Qty: 5 CAPSULE | Refills: 6 | Status: SHIPPED | OUTPATIENT
Start: 2024-09-23

## 2024-09-23 RX ORDER — TEMOZOLOMIDE 250 MG/1
CAPSULE ORAL
Qty: 5 CAPSULE | Refills: 6 | Status: SHIPPED | OUTPATIENT
Start: 2024-09-23

## 2024-10-06 DIAGNOSIS — C71.1 MALIGNANT NEOPLASM OF FRONTAL LOBE: Chronic | ICD-10-CM

## 2024-10-07 RX ORDER — SULFAMETHOXAZOLE AND TRIMETHOPRIM 800; 160 MG/1; MG/1
1 TABLET ORAL DAILY
Qty: 30 TABLET | Refills: 0 | Status: SHIPPED | OUTPATIENT
Start: 2024-10-07

## 2024-10-09 ENCOUNTER — PATIENT MESSAGE (OUTPATIENT)
Dept: NEUROSURGERY | Facility: CLINIC | Age: 49
End: 2024-10-09
Payer: COMMERCIAL

## 2024-10-25 LAB
ALBUMIN SERPL BCP-MCNC: 4 G/DL (ref 3.4–5)
ALBUMIN/GLOBULIN RATIO: 1.05 RATIO (ref 1.1–1.8)
ALP SERPL-CCNC: 76 U/L (ref 46–116)
ALT SERPL W P-5'-P-CCNC: 37 U/L (ref 12–78)
ANION GAP SERPL CALC-SCNC: 9 MMOL/L (ref 3–11)
AST SERPL-CCNC: 30 U/L (ref 15–37)
BASOPHILS NFR BLD: 0.6 % (ref 0–3)
BILIRUB SERPL-MCNC: 0.5 MG/DL (ref 0–1)
BUN SERPL-MCNC: 12 MG/DL (ref 7–18)
BUN/CREAT SERPL: 10.52 RATIO (ref 7–18)
CALCIUM SERPL-MCNC: 9.3 MG/DL (ref 8.8–10.5)
CHLORIDE SERPL-SCNC: 102 MMOL/L (ref 100–108)
CO2 SERPL-SCNC: 27 MMOL/L (ref 21–32)
CREAT SERPL-MCNC: 1.14 MG/DL (ref 0.7–1.3)
EOSINOPHIL NFR BLD: 4.2 % (ref 1–3)
ERYTHROCYTE [DISTWIDTH] IN BLOOD BY AUTOMATED COUNT: 13 % (ref 12.5–18)
GFR ESTIMATION: 78
GLOBULIN: 3.8 G/DL (ref 2.3–3.5)
GLUCOSE SERPL-MCNC: 92 MG/DL (ref 70–110)
HCT VFR BLD AUTO: 41.8 % (ref 42–52)
HGB BLD-MCNC: 14.2 G/DL (ref 14–18)
LYMPHOCYTES NFR BLD: 10.4 % (ref 25–40)
MCH RBC QN AUTO: 28.6 PG (ref 27–31.2)
MCHC RBC AUTO-ENTMCNC: 34 G/DL (ref 31.8–35.4)
MCV RBC AUTO: 84.3 FL (ref 80–97)
MONOCYTES NFR BLD: 10.6 % (ref 1–15)
NEUTROPHILS # BLD AUTO: 3.69 10*3/UL (ref 1.8–7.7)
NEUTROPHILS NFR BLD: 73.8 % (ref 37–80)
NUCLEATED RED BLOOD CELLS: 0 %
PLATELETS: 339 10*3/UL (ref 142–424)
POTASSIUM SERPL-SCNC: 4 MMOL/L (ref 3.6–5.2)
PROT SERPL-MCNC: 7.8 G/DL (ref 6.4–8.2)
RBC # BLD AUTO: 4.96 10*6/UL (ref 4.7–6.1)
SODIUM BLD-SCNC: 138 MMOL/L (ref 135–145)
WBC # BLD: 5 10*3/UL (ref 4.6–10.2)

## 2024-10-28 ENCOUNTER — HOSPITAL ENCOUNTER (OUTPATIENT)
Dept: RADIOLOGY | Facility: HOSPITAL | Age: 49
Discharge: HOME | End: 2024-10-28
Attending: NEUROLOGICAL SURGERY
Payer: COMMERCIAL

## 2024-10-28 DIAGNOSIS — D49.6 BRAIN TUMOR: ICD-10-CM

## 2024-10-28 DIAGNOSIS — C71.9 OLIGODENDROGLIOMA: ICD-10-CM

## 2024-10-28 PROCEDURE — A9577 INJ MULTIHANCE: HCPCS | Performed by: NEUROLOGICAL SURGERY

## 2024-10-28 PROCEDURE — 70553 MRI BRAIN STEM W/O & W/DYE: CPT | Mod: TC

## 2024-10-28 PROCEDURE — 25500020 PHARM REV CODE 255: Performed by: NEUROLOGICAL SURGERY

## 2024-10-28 RX ADMIN — GADOBENATE DIMEGLUMINE 15 ML: 529 INJECTION, SOLUTION INTRAVENOUS at 01:10

## 2024-10-29 ENCOUNTER — OFFICE VISIT (OUTPATIENT)
Dept: HEMATOLOGY/ONCOLOGY | Facility: CLINIC | Age: 49
End: 2024-10-29
Payer: COMMERCIAL

## 2024-10-29 ENCOUNTER — OFFICE VISIT (OUTPATIENT)
Dept: NEUROSURGERY | Facility: CLINIC | Age: 49
End: 2024-10-29
Payer: COMMERCIAL

## 2024-10-29 VITALS
TEMPERATURE: 99 F | SYSTOLIC BLOOD PRESSURE: 133 MMHG | RESPIRATION RATE: 16 BRPM | DIASTOLIC BLOOD PRESSURE: 76 MMHG | BODY MASS INDEX: 23.17 KG/M2 | OXYGEN SATURATION: 95 % | HEIGHT: 70 IN | WEIGHT: 161.81 LBS | HEART RATE: 83 BPM

## 2024-10-29 DIAGNOSIS — C71.1 MALIGNANT NEOPLASM OF FRONTAL LOBE: Primary | ICD-10-CM

## 2024-10-29 DIAGNOSIS — C71.9 OLIGODENDROGLIOMA: Primary | ICD-10-CM

## 2024-10-29 DIAGNOSIS — C71.9 GLIOMA: ICD-10-CM

## 2024-10-29 PROCEDURE — 3075F SYST BP GE 130 - 139MM HG: CPT | Mod: CPTII,,, | Performed by: INTERNAL MEDICINE

## 2024-10-29 PROCEDURE — 1159F MED LIST DOCD IN RCRD: CPT | Mod: CPTII,95,, | Performed by: NEUROLOGICAL SURGERY

## 2024-10-29 PROCEDURE — 1160F RVW MEDS BY RX/DR IN RCRD: CPT | Mod: CPTII,,, | Performed by: INTERNAL MEDICINE

## 2024-10-29 PROCEDURE — 99214 OFFICE O/P EST MOD 30 MIN: CPT | Mod: 95,,, | Performed by: NEUROLOGICAL SURGERY

## 2024-10-29 PROCEDURE — 3008F BODY MASS INDEX DOCD: CPT | Mod: CPTII,,, | Performed by: INTERNAL MEDICINE

## 2024-10-29 PROCEDURE — 1159F MED LIST DOCD IN RCRD: CPT | Mod: CPTII,,, | Performed by: INTERNAL MEDICINE

## 2024-10-29 PROCEDURE — 3078F DIAST BP <80 MM HG: CPT | Mod: CPTII,,, | Performed by: INTERNAL MEDICINE

## 2024-10-29 PROCEDURE — 1160F RVW MEDS BY RX/DR IN RCRD: CPT | Mod: CPTII,95,, | Performed by: NEUROLOGICAL SURGERY

## 2024-10-29 PROCEDURE — 99214 OFFICE O/P EST MOD 30 MIN: CPT | Mod: S$PBB,,, | Performed by: INTERNAL MEDICINE

## 2024-12-07 DIAGNOSIS — R33.9 INCOMPLETE BLADDER EMPTYING: ICD-10-CM

## 2024-12-09 RX ORDER — FINASTERIDE 5 MG/1
5 TABLET, FILM COATED ORAL
Qty: 90 TABLET | Refills: 0 | Status: SHIPPED | OUTPATIENT
Start: 2024-12-09

## 2024-12-09 NOTE — TELEPHONE ENCOUNTER
Airway    Performed by: Alysa Sanches CRNA  Authorized by: Froy Vela MD    Airway interventions: NC.   Patient Identified, Procedure confirmed, Emergency equipment available and Safety protocols followed  Location:  OR  Indications for Airway Management:  Anesthesia  Spontaneous Ventilation: present    Sedation Level:  Deep  Mask Difficulty Assessment:  1 - vent by mask       Please see the attached refill request.

## 2025-01-28 LAB
ALBUMIN SERPL BCP-MCNC: 3.8 G/DL (ref 3.4–5)
ALBUMIN/GLOBULIN RATIO: 1.27 RATIO (ref 1.1–1.8)
ALP SERPL-CCNC: 74 U/L (ref 46–116)
ALT SERPL W P-5'-P-CCNC: 32 U/L (ref 12–78)
ANION GAP SERPL CALC-SCNC: 8 MMOL/L (ref 3–11)
AST SERPL-CCNC: 18 U/L (ref 15–37)
BASOPHILS NFR BLD: 1 % (ref 0–3)
BILIRUB SERPL-MCNC: 0.5 MG/DL (ref 0–1)
BUN SERPL-MCNC: 12 MG/DL (ref 7–18)
BUN/CREAT SERPL: 11.11 RATIO (ref 7–18)
CALCIUM SERPL-MCNC: 8.7 MG/DL (ref 8.8–10.5)
CHLORIDE SERPL-SCNC: 104 MMOL/L (ref 100–108)
CO2 SERPL-SCNC: 29 MMOL/L (ref 21–32)
CREAT SERPL-MCNC: 1.08 MG/DL (ref 0.7–1.3)
EOSINOPHIL NFR BLD: 3.4 % (ref 1–3)
ERYTHROCYTE [DISTWIDTH] IN BLOOD BY AUTOMATED COUNT: 12.6 % (ref 12.5–18)
GFR ESTIMATION: 83
GLOBULIN: 3 G/DL (ref 2.3–3.5)
GLUCOSE SERPL-MCNC: 92 MG/DL (ref 70–110)
HCT VFR BLD AUTO: 39.7 % (ref 42–52)
HGB BLD-MCNC: 13.7 G/DL (ref 14–18)
LYMPHOCYTES NFR BLD: 11.9 % (ref 25–40)
MCH RBC QN AUTO: 29.1 PG (ref 27–31.2)
MCHC RBC AUTO-ENTMCNC: 34.5 G/DL (ref 31.8–35.4)
MCV RBC AUTO: 84.3 FL (ref 80–97)
MONOCYTES NFR BLD: 9.5 % (ref 1–15)
NEUTROPHILS # BLD AUTO: 4.34 10*3/UL (ref 1.8–7.7)
NEUTROPHILS NFR BLD: 73.9 % (ref 37–80)
NUCLEATED RED BLOOD CELLS: 0 %
PLATELETS: 359 10*3/UL (ref 142–424)
POTASSIUM SERPL-SCNC: 4.1 MMOL/L (ref 3.6–5.2)
PROT SERPL-MCNC: 6.8 G/DL (ref 6.4–8.2)
RBC # BLD AUTO: 4.71 10*6/UL (ref 4.7–6.1)
SODIUM BLD-SCNC: 141 MMOL/L (ref 135–145)
WBC # BLD: 5.9 10*3/UL (ref 4.6–10.2)

## 2025-01-29 ENCOUNTER — OFFICE VISIT (OUTPATIENT)
Dept: HEMATOLOGY/ONCOLOGY | Facility: CLINIC | Age: 50
End: 2025-01-29
Payer: COMMERCIAL

## 2025-01-29 VITALS
HEIGHT: 70 IN | OXYGEN SATURATION: 95 % | DIASTOLIC BLOOD PRESSURE: 79 MMHG | SYSTOLIC BLOOD PRESSURE: 128 MMHG | BODY MASS INDEX: 24.58 KG/M2 | HEART RATE: 72 BPM | RESPIRATION RATE: 16 BRPM | WEIGHT: 171.69 LBS | TEMPERATURE: 98 F

## 2025-01-29 DIAGNOSIS — C71.1 MALIGNANT NEOPLASM OF FRONTAL LOBE: Primary | ICD-10-CM

## 2025-01-29 PROCEDURE — 99214 OFFICE O/P EST MOD 30 MIN: CPT | Mod: S$PBB,,, | Performed by: INTERNAL MEDICINE

## 2025-01-29 PROCEDURE — 3074F SYST BP LT 130 MM HG: CPT | Mod: CPTII,,, | Performed by: INTERNAL MEDICINE

## 2025-01-29 PROCEDURE — 3008F BODY MASS INDEX DOCD: CPT | Mod: CPTII,,, | Performed by: INTERNAL MEDICINE

## 2025-01-29 PROCEDURE — 3078F DIAST BP <80 MM HG: CPT | Mod: CPTII,,, | Performed by: INTERNAL MEDICINE

## 2025-01-29 PROCEDURE — 1159F MED LIST DOCD IN RCRD: CPT | Mod: CPTII,,, | Performed by: INTERNAL MEDICINE

## 2025-01-29 NOTE — PROGRESS NOTES
MEDICAL ONCOLOGY FOLLOW UP CONSULTATION NOTE    Patient ID: Marvin Pool is a 49 y.o. male.    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  He also completed chemo XRT and now is currently on adjuvant Temodar.  He would like to establish care here locally in [place] and voices no acute complaints reporting that he is tolerated Temodar without any side effects at this time.  He presents to our clinic today for further evaluation        Imaging:       MRI brain:  01/12/23    Expected immediate postsurgical changes of left frontal craniotomy for frontal mass resection.     Persistent area of T2 FLAIR hyperintense signal surrounding the resection is similar size compared to preoperative MRI. Typical thin peripheral enhancement around the resection cavity.        Past Medical History:   Diagnosis Date    Brain tumor 1/12/2023    Seizures 1/12/2023     Family History   Problem Relation Name Age of Onset    No Known Problems Mother      No Known Problems Father      No Known Problems Sister      No Known Problems Brother      No Known Problems Maternal Aunt       Social History     Socioeconomic History    Marital status:    Tobacco Use    Smoking status: Never    Smokeless tobacco: Never   Substance and Sexual Activity    Alcohol use: Not Currently     Social Drivers of Health     Financial Resource Strain: Low Risk  (11/16/2023)    Overall Financial Resource Strain (CARDIA)     Difficulty of Paying Living Expenses: Not very hard   Food Insecurity: No Food Insecurity (11/16/2023)    Hunger Vital Sign     Worried About Running Out of Food in the Last Year: Never true     Ran Out of Food in the Last Year: Never true   Transportation Needs: No Transportation Needs (11/16/2023)    PRAPARE - Transportation     Lack of Transportation (Medical):  No     Lack of Transportation (Non-Medical): No   Physical Activity: Sufficiently Active (11/16/2023)    Exercise Vital Sign     Days of Exercise per Week: 6 days     Minutes of Exercise per Session: 120 min   Stress: Stress Concern Present (11/16/2023)    [employer] - Occupational Stress Questionnaire     Feeling of Stress : Rather much   Housing Stability: High Risk (11/16/2023)    Housing Stability Vital Sign     Unable to Pay for Housing in the Last Year: Yes     Number of Places Lived in the Last Year: 1     Unstable Housing in the Last Year: No     Past Surgical History:   Procedure Laterality Date    CRANIOTOMY USING FRAMELESS STEREOTAXY Left 1/12/2023    Procedure: CRANIOTOMY, USING FRAMELESS STEREOTAXY;  Surgeon: Wellington Park MD;  Location: [facility] OR [address] [place] [address];  Service: Neurosurgery;  Laterality: Left;         Review of systems:  Review of Systems   Constitutional:  Negative for activity change, appetite change, chills, diaphoresis, fatigue and unexpected weight change.   HENT:  Negative for congestion, facial swelling, hearing loss, mouth sores, trouble swallowing and voice change.    Eyes:  Negative for photophobia, pain, discharge and itching.   Respiratory:  Negative for apnea, cough, choking, chest tightness and shortness of breath.    Cardiovascular:  Negative for chest pain, palpitations and leg swelling.   Gastrointestinal:  Negative for abdominal distention, abdominal pain, anal bleeding and blood in stool.   Endocrine: Negative for cold intolerance, heat intolerance, polydipsia and polyphagia.   Genitourinary:  Negative for difficulty urinating, dysuria, flank pain and hematuria.   Musculoskeletal:  Negative for arthralgias, back pain, joint swelling, myalgias, neck pain and neck stiffness.   Skin:  Negative for color change, pallor and wound.   Allergic/Immunologic: Negative for environmental allergies, food allergies and immunocompromised state.   Neurological:   Negative for dizziness, seizures, facial asymmetry, speech difficulty, light-headedness, numbness and headaches.   Hematological:  Negative for adenopathy. Does not bruise/bleed easily.   Psychiatric/Behavioral:  Negative for agitation, behavioral problems, confusion, decreased concentration and sleep disturbance.            Physical Exam  Vitals and nursing note reviewed.   Constitutional:       General: He is not in acute distress.     Appearance: Normal appearance. He is not ill-appearing.   HENT:      Head: Normocephalic and atraumatic.      Nose: No congestion or rhinorrhea.   Eyes:      General: No scleral icterus.     Extraocular Movements: Extraocular movements intact.      Pupils: Pupils are equal, round, and reactive to light.   Cardiovascular:      Rate and Rhythm: Normal rate and regular rhythm.      Pulses: Normal pulses.      Heart sounds: Normal heart sounds. No murmur heard.     No gallop.   Pulmonary:      Effort: Pulmonary effort is normal. No respiratory distress.      Breath sounds: Normal breath sounds. No stridor. No wheezing or rhonchi.   Abdominal:      General: Bowel sounds are normal. There is no distension.      Palpations: There is no mass.      Tenderness: There is no abdominal tenderness. There is no guarding.   Musculoskeletal:         General: No swelling, tenderness, deformity or signs of injury. Normal range of motion.      Cervical back: Normal range of motion and neck supple. No rigidity. No muscular tenderness.      Right lower leg: No edema.      Left lower leg: No edema.   Skin:     General: Skin is warm.      Coloration: Skin is not jaundiced or pale.      Findings: No bruising or lesion.   Neurological:      General: No focal deficit present.      Mental Status: He is alert and oriented to person, place, and time.      Cranial Nerves: No cranial nerve deficit.      Sensory: No sensory deficit.      Motor: No weakness.      Gait: Gait normal.   Psychiatric:         Mood and  Affect: Mood normal.         Behavior: Behavior normal.         Thought Content: Thought content normal.                 Vitals:    01/29/25 1322   BP: 128/79   Pulse: 72   Resp: 16   Temp: 98 °F (36.7 °C)           Body surface area is 1.96 meters squared.    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   == s/p Chemo-XRT completed recently and now on adjuvant Temodar tolerating well. I will add Bactrim DS for PCP prohylaxis at this time and see patient prior to C#2  == 7/10/23: Labs reviewed. Tolerating Bactrim DS well with no dose limiting toxicities. Voices no complaints. Next MRI brain in Aug 2023  ==8/07/23: First cycle of adjuvant Temozolomide well tolerated, patient has some mild nausea relieved with phenergan prn. He is not taking ondansetron states phenergan works better.  Temozolomide dosage is 390mg days 1-5 q 28 days.  Reports good compliance with bactrim for pcp prophylaxis. MRI Brain reviewed Next MRI brain is scheduled November 6th 2023.  No headaches, visual changes. Good compliance with Keppra, will check keppra level with labs.  ==09/05/23: Patient is s/p 2 cycles of adjuvant Temozolomide, has intermittent nausea relieved with phenergan.  No s/s infection, is on bactrim for pcp prophylaxis.  Plan to continue adjuvant Temozolomide 6-12 cycles with MRI brain on 11/6/23 has set up per radiation oncology. Keppra level not drawn, will add to orders  ==10/09/23: Patient doing well, recently saw neurosurgery with MRI brain which was noted to have continued stable residual disease.  Temozolomide well tolerated, reports occasional nausea relieved with ondansetron.  Will continue with Temozolomide, patient is cleared to start cycle 4 with 6-12 planned adjuvant cycles. Next MRI brain scheduled by neurosurgery in 4 months  ==11/6/23: Patient due to start cycle 5  adjuvant Temozolomide.  Tolerating well.  ==12/20/23: MRI brain 12/4/23 shows stable disease with post radiation changes. Stable T2 hyperintense signal at the operative cavity which may be a combination of tumor and or gliosis.  ==01/17/24: Pt due for cycle 7 adjuvant Temozolomide.  He reports some headaches, mild expressive aphasia and stuttering intermittent for about last month.  He has MRI scheduled on 2/26/2024 scheduled by Dr. Park, patient's neurosurgeon.  I sent a message to Dr. Park regarding MRI to see if he would like to move it up.   ==01/18/24 Addendum: message received from Dr. Park, neurosurgeon.  He will have his office move up patient's MRI to be completed in [facility].  His office will contact patient  ==03/21/24: Pt is s/p cycle 8 adjuvant temozolomide. He recently had MRI and appt with Dr. Park, neurosurgeon which showed no evidence of recurrence or progression.  He was also having some difficulty voiding and urinary retention, saw Dr. Garay who performed a cystoscope which was normal. He has been unable to get Temodar for the last 2-3 weeks citing insurance and logistics issue. I will have my staff help him out in this regard.  == 5/27/24: Continuing with Temodar. Voices no acute issues. Last MRI brain April 2024 shows completely stable postop changes and no post radiation change with no evidence of tumor progression. Will continue with 12 cycles.  ==09/17/24: Tolerating temodar well. Last MRI brain for restaging done by Dr Park showed some response to Temodar. Plan to continue with Temodar for now until next restaging scan. Restaging scans being ordered by Dr Park.  == 10/30/24:  Patient continues to do well.  MRI restaging scan with encouraging results.  Plan to complete total 12 cycles of Temodar.  Patient will currently start his last cycle this coming week.  == 1/29/25: S/p completion of adjuvant Temodar doing well. Will obtain Restaging MRI brain    2. Anemia  == Mild. Will continue to  monitor.     3. Nasal congestion  ==Resolved    Plan:  Continue observation      RTC  in 4 weeks  for MD visit with labs: CBC, CMP, MRI brain with contrast prior            Total time spent in counseling and discussion about further management options including relevant lab work, treatment,  prognosis, medications and intended side effects was more than 30 minutes. More than 50% of the time was spent on counseling and coordination of care.  I spent a total of 30 minutes on the day of the visit.This includes face to face time and non-face to face time preparing to see the patient (eg, review of tests), Obtaining and/or reviewing separately obtained history, Documenting clinical information in the electronic or other health record, Independently interpreting resultsand communicating results to the patient/family/caregiver, or Care coordination.

## 2025-02-18 ENCOUNTER — TELEPHONE (OUTPATIENT)
Dept: HEMATOLOGY/ONCOLOGY | Facility: CLINIC | Age: 50
End: 2025-02-18
Payer: COMMERCIAL

## 2025-02-18 NOTE — TELEPHONE ENCOUNTER
----- Message from Velma sent at 2/18/2025  3:31 PM CST -----  .Type:  Pharmacy Calling to Clarify an RXName of Caller: [facility], [facility] [address] [place] [address]Uczvm: [phone] Fax: [phone] Name: temozolomide (TEMODAR) 140 MG capsule and temozolomide (TEMODAR) 250 MG capsulWhat do they need to clarify?: the prior authorization expires march 1 st.Best Call Back Number: [phone] Information:  alternative number [phone]

## 2025-02-21 ENCOUNTER — TELEPHONE (OUTPATIENT)
Dept: HEMATOLOGY/ONCOLOGY | Facility: CLINIC | Age: 50
End: 2025-02-21
Payer: COMMERCIAL

## 2025-02-21 NOTE — TELEPHONE ENCOUNTER
----- Message from Med Assistant Mcintosh sent at 2/21/2025 12:12 PM CST -----  Can you follow up on this please  ----- Message -----  From: Velma Kennedy  Sent: 2/18/2025   3:35 PM CST  To: Elfego Wiggins Staff    .Type:  Pharmacy Calling to Clarify an RXName of Caller: [facility], Pharmacy Name: [address] [address]Zlptq: [phone] Fax: [phone] Name: temozolomide (TEMODAR) 140 MG capsule and temozolomide (TEMODAR) 250 MG capsulWhat do they need to clarify?: the prior authorization expires march 1 st.Best Call Back Number: [phone] Information:  alternative number [phone]

## 2025-02-25 ENCOUNTER — OFFICE VISIT (OUTPATIENT)
Dept: HEMATOLOGY/ONCOLOGY | Facility: CLINIC | Age: 50
End: 2025-02-25
Payer: COMMERCIAL

## 2025-02-25 VITALS
HEIGHT: 70 IN | HEART RATE: 64 BPM | SYSTOLIC BLOOD PRESSURE: 133 MMHG | WEIGHT: 171 LBS | OXYGEN SATURATION: 97 % | RESPIRATION RATE: 16 BRPM | DIASTOLIC BLOOD PRESSURE: 81 MMHG | BODY MASS INDEX: 24.48 KG/M2

## 2025-02-25 DIAGNOSIS — C71.9 GLIOMA: Primary | ICD-10-CM

## 2025-02-25 DIAGNOSIS — C71.1 MALIGNANT NEOPLASM OF FRONTAL LOBE: ICD-10-CM

## 2025-02-25 RX ORDER — KETOCONAZOLE 20 MG/ML
2 SHAMPOO, SUSPENSION TOPICAL
COMMUNITY
Start: 2025-02-03

## 2025-02-25 RX ORDER — PROMETHAZINE HYDROCHLORIDE 25 MG/1
25 TABLET ORAL EVERY 6 HOURS PRN
COMMUNITY

## 2025-02-25 RX ORDER — CLOTRIMAZOLE AND BETAMETHASONE DIPROPIONATE 10; .64 MG/G; MG/G
90 CREAM TOPICAL 2 TIMES DAILY
COMMUNITY
Start: 2025-02-03

## 2025-02-25 NOTE — PROGRESS NOTES
MEDICAL ONCOLOGY FOLLOW UP CONSULTATION NOTE    Patient ID: Marvin Pool is a 49 y.o. male.    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  He also completed chemo XRT and now is currently on adjuvant Temodar.  He would like to establish care here locally in [place] and voices no acute complaints reporting that he is tolerated Temodar without any side effects at this time.  He presents to our clinic today for further evaluation        Imaging:       MRI brain:  01/12/23    Expected immediate postsurgical changes of left frontal craniotomy for frontal mass resection.     Persistent area of T2 FLAIR hyperintense signal surrounding the resection is similar size compared to preoperative MRI. Typical thin peripheral enhancement around the resection cavity.        Past Medical History:   Diagnosis Date    Brain tumor 1/12/2023    Seizures 1/12/2023     Family History   Problem Relation Name Age of Onset    No Known Problems Mother      No Known Problems Father      No Known Problems Sister      No Known Problems Brother      No Known Problems Maternal Aunt       Social History     Socioeconomic History    Marital status:    Tobacco Use    Smoking status: Never    Smokeless tobacco: Never   Substance and Sexual Activity    Alcohol use: Not Currently     Social Drivers of Health     Financial Resource Strain: Low Risk  (11/16/2023)    Overall Financial Resource Strain (CARDIA)     Difficulty of Paying Living Expenses: Not very hard   Food Insecurity: No Food Insecurity (11/16/2023)    Hunger Vital Sign     Worried About Running Out of Food in the Last Year: Never true     Ran Out of Food in the Last Year: Never true   Transportation Needs: No Transportation Needs (11/16/2023)    PRAPARE - Transportation     Lack of Transportation (Medical):  No     Lack of Transportation (Non-Medical): No   Physical Activity: Sufficiently Active (11/16/2023)    Exercise Vital Sign     Days of Exercise per Week: 6 days     Minutes of Exercise per Session: 120 min   Stress: Stress Concern Present (11/16/2023)    [employer] - Occupational Stress Questionnaire     Feeling of Stress : Rather much   Housing Stability: High Risk (11/16/2023)    Housing Stability Vital Sign     Unable to Pay for Housing in the Last Year: Yes     Number of Places Lived in the Last Year: 1     Unstable Housing in the Last Year: No     Past Surgical History:   Procedure Laterality Date    CRANIOTOMY USING FRAMELESS STEREOTAXY Left 1/12/2023    Procedure: CRANIOTOMY, USING FRAMELESS STEREOTAXY;  Surgeon: Wellington Park MD;  Location: [facility] OR [address] [place] [address];  Service: Neurosurgery;  Laterality: Left;         Review of systems:  Review of Systems   Constitutional:  Negative for activity change, appetite change, chills, diaphoresis, fatigue and unexpected weight change.   HENT:  Negative for congestion, facial swelling, hearing loss, mouth sores, trouble swallowing and voice change.    Eyes:  Negative for photophobia, pain, discharge and itching.   Respiratory:  Negative for apnea, cough, choking, chest tightness and shortness of breath.    Cardiovascular:  Negative for chest pain, palpitations and leg swelling.   Gastrointestinal:  Negative for abdominal distention, abdominal pain, anal bleeding and blood in stool.   Endocrine: Negative for cold intolerance, heat intolerance, polydipsia and polyphagia.   Genitourinary:  Negative for difficulty urinating, dysuria, flank pain and hematuria.   Musculoskeletal:  Negative for arthralgias, back pain, joint swelling, myalgias, neck pain and neck stiffness.   Skin:  Negative for color change, pallor and wound.   Allergic/Immunologic: Negative for environmental allergies, food allergies and immunocompromised state.   Neurological:   Negative for dizziness, seizures, facial asymmetry, speech difficulty, light-headedness, numbness and headaches.   Hematological:  Negative for adenopathy. Does not bruise/bleed easily.   Psychiatric/Behavioral:  Negative for agitation, behavioral problems, confusion, decreased concentration and sleep disturbance.            Physical Exam  Vitals and nursing note reviewed.   Constitutional:       General: He is not in acute distress.     Appearance: Normal appearance. He is not ill-appearing.   HENT:      Head: Normocephalic and atraumatic.      Nose: No congestion or rhinorrhea.   Eyes:      General: No scleral icterus.     Extraocular Movements: Extraocular movements intact.      Pupils: Pupils are equal, round, and reactive to light.   Cardiovascular:      Rate and Rhythm: Normal rate and regular rhythm.      Pulses: Normal pulses.      Heart sounds: Normal heart sounds. No murmur heard.     No gallop.   Pulmonary:      Effort: Pulmonary effort is normal. No respiratory distress.      Breath sounds: Normal breath sounds. No stridor. No wheezing or rhonchi.   Abdominal:      General: Bowel sounds are normal. There is no distension.      Palpations: There is no mass.      Tenderness: There is no abdominal tenderness. There is no guarding.   Musculoskeletal:         General: No swelling, tenderness, deformity or signs of injury. Normal range of motion.      Cervical back: Normal range of motion and neck supple. No rigidity. No muscular tenderness.      Right lower leg: No edema.      Left lower leg: No edema.   Skin:     General: Skin is warm.      Coloration: Skin is not jaundiced or pale.      Findings: No bruising or lesion.   Neurological:      General: No focal deficit present.      Mental Status: He is alert and oriented to person, place, and time.      Cranial Nerves: No cranial nerve deficit.      Sensory: No sensory deficit.      Motor: No weakness.      Gait: Gait normal.   Psychiatric:         Mood and  Affect: Mood normal.         Behavior: Behavior normal.         Thought Content: Thought content normal.                 Vitals:    02/25/25 0808   BP: 133/81   Pulse: 64   Resp: 16           Body surface area is 1.96 meters squared.    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   == s/p Chemo-XRT completed recently and now on adjuvant Temodar tolerating well. I will add Bactrim DS for PCP prohylaxis at this time and see patient prior to C#2  == 7/10/23: Labs reviewed. Tolerating Bactrim DS well with no dose limiting toxicities. Voices no complaints. Next MRI brain in Aug 2023  ==8/07/23: First cycle of adjuvant Temozolomide well tolerated, patient has some mild nausea relieved with phenergan prn. He is not taking ondansetron states phenergan works better.  Temozolomide dosage is 390mg days 1-5 q 28 days.  Reports good compliance with bactrim for pcp prophylaxis. MRI Brain reviewed Next MRI brain is scheduled November 6th 2023.  No headaches, visual changes. Good compliance with Keppra, will check keppra level with labs.  ==09/05/23: Patient is s/p 2 cycles of adjuvant Temozolomide, has intermittent nausea relieved with phenergan.  No s/s infection, is on bactrim for pcp prophylaxis.  Plan to continue adjuvant Temozolomide 6-12 cycles with MRI brain on 11/6/23 has set up per radiation oncology. Keppra level not drawn, will add to orders  ==10/09/23: Patient doing well, recently saw neurosurgery with MRI brain which was noted to have continued stable residual disease.  Temozolomide well tolerated, reports occasional nausea relieved with ondansetron.  Will continue with Temozolomide, patient is cleared to start cycle 4 with 6-12 planned adjuvant cycles. Next MRI brain scheduled by neurosurgery in 4 months  ==11/6/23: Patient due to start cycle 5 adjuvant Temozolomide.   Tolerating well.  ==12/20/23: MRI brain 12/4/23 shows stable disease with post radiation changes. Stable T2 hyperintense signal at the operative cavity which may be a combination of tumor and or gliosis.  ==01/17/24: Pt due for cycle 7 adjuvant Temozolomide.  He reports some headaches, mild expressive aphasia and stuttering intermittent for about last month.  He has MRI scheduled on 2/26/2024 scheduled by Dr. Park, patient's neurosurgeon.  I sent a message to Dr. Park regarding MRI to see if he would like to move it up.   ==01/18/24 Addendum: message received from Dr. Park, neurosurgeon.  He will have his office move up patient's MRI to be completed in [facility].  His office will contact patient  ==03/21/24: Pt is s/p cycle 8 adjuvant temozolomide. He recently had MRI and appt with Dr. Park, neurosurgeon which showed no evidence of recurrence or progression.  He was also having some difficulty voiding and urinary retention, saw Dr. Garay who performed a cystoscope which was normal. He has been unable to get Temodar for the last 2-3 weeks citing insurance and logistics issue. I will have my staff help him out in this regard.  == 5/27/24: Continuing with Temodar. Voices no acute issues. Last MRI brain April 2024 shows completely stable postop changes and no post radiation change with no evidence of tumor progression. Will continue with 12 cycles.  ==09/17/24: Tolerating temodar well. Last MRI brain for restaging done by Dr Park showed some response to Temodar. Plan to continue with Temodar for now until next restaging scan. Restaging scans being ordered by Dr Park.  == 10/30/24:  Patient continues to do well.  MRI restaging scan with encouraging results.  Plan to complete total 12 cycles of Temodar.  Patient will currently start his last cycle this coming week.  == 2/25/25: S/p completion of adjuvant Temodar doing well.  Restaging MRI brain showed no evidence of recurrence. Would recommend to continue imaging  surveillance and repeat MRI brain in 2 months. Post operative changes/edema noted and will continue to monitor closely.       2. Anemia  == Mild. Will continue to monitor.     3. Nasal congestion  ==Resolved    Plan:  Continue observation      RTC  in 2 months  for MD visit with labs: CBC, CMP, MRI brain with contrast prior            Total time spent in counseling and discussion about further management options including relevant lab work, treatment,  prognosis, medications and intended side effects was more than 30 minutes. More than 50% of the time was spent on counseling and coordination of care.  I spent a total of 30 minutes on the day of the visit.This includes face to face time and non-face to face time preparing to see the patient (eg, review of tests), Obtaining and/or reviewing separately obtained history, Documenting clinical information in the electronic or other health record, Independently interpreting resultsand communicating results to the patient/family/caregiver, or Care coordination.

## 2025-02-28 DIAGNOSIS — R33.9 INCOMPLETE BLADDER EMPTYING: ICD-10-CM

## 2025-02-28 RX ORDER — FINASTERIDE 5 MG/1
5 TABLET, FILM COATED ORAL
Qty: 90 TABLET | Refills: 0 | Status: SHIPPED | OUTPATIENT
Start: 2025-02-28

## 2025-03-17 DIAGNOSIS — C71.9 GLIOMA: ICD-10-CM

## 2025-03-17 RX ORDER — TEMOZOLOMIDE 250 MG/1
CAPSULE ORAL
Qty: 5 CAPSULE | Refills: 6 | Status: SHIPPED | OUTPATIENT
Start: 2025-03-17

## 2025-03-17 RX ORDER — TEMOZOLOMIDE 140 MG/1
CAPSULE ORAL
Qty: 5 CAPSULE | Refills: 6 | Status: SHIPPED | OUTPATIENT
Start: 2025-03-17

## 2025-04-09 DIAGNOSIS — R33.9 INCOMPLETE BLADDER EMPTYING: ICD-10-CM

## 2025-04-09 RX ORDER — FINASTERIDE 5 MG/1
5 TABLET, FILM COATED ORAL
Qty: 90 TABLET | Refills: 0 | Status: SHIPPED | OUTPATIENT
Start: 2025-04-09

## 2025-06-30 ENCOUNTER — TELEPHONE (OUTPATIENT)
Dept: HEMATOLOGY/ONCOLOGY | Facility: CLINIC | Age: 50
End: 2025-06-30
Payer: COMMERCIAL

## 2025-06-30 NOTE — TELEPHONE ENCOUNTER
Copied from CRM #[id number]. Topic: Appointments - Appointment Access  >> Jun 30, 2025  8:42 AM Nina wrote:  ..Type:  Patient Requesting Call    Who Called:April (Spouse)  Would the patient rather a call back or a response via [facility]? Call  Best Call Back Number:[phone]  Additional Information: Spouse called to schedule an appointment - neuro issues

## 2025-06-30 NOTE — TELEPHONE ENCOUNTER
Copied from CRM #[id number]. Topic: General Inquiry - Return Call  >> Jun 30, 2025 11:35 AM Velma wrote:  ..Type:  Patient Requesting Call    Who Called: Marvin Pool  What is the call regarding?: called to get appt rescheduled. Pt can't make it due to jury duty   Would the patient rather a call back or a response via [facility]?  call  Best Call Back Number: [phone]  Additional Information:

## 2025-07-15 ENCOUNTER — OFFICE VISIT (OUTPATIENT)
Dept: HEMATOLOGY/ONCOLOGY | Facility: CLINIC | Age: 50
End: 2025-07-15
Payer: COMMERCIAL

## 2025-07-15 VITALS
OXYGEN SATURATION: 97 % | BODY MASS INDEX: 23.07 KG/M2 | WEIGHT: 161.19 LBS | DIASTOLIC BLOOD PRESSURE: 72 MMHG | RESPIRATION RATE: 16 BRPM | SYSTOLIC BLOOD PRESSURE: 129 MMHG | HEART RATE: 57 BPM | HEIGHT: 70 IN

## 2025-07-15 DIAGNOSIS — C71.1 MALIGNANT NEOPLASM OF FRONTAL LOBE: ICD-10-CM

## 2025-07-15 DIAGNOSIS — C71.9 GLIOMA: Primary | ICD-10-CM

## 2025-07-15 DIAGNOSIS — D49.6 BRAIN TUMOR: ICD-10-CM

## 2025-07-15 PROCEDURE — 1159F MED LIST DOCD IN RCRD: CPT | Mod: CPTII,,, | Performed by: INTERNAL MEDICINE

## 2025-07-15 PROCEDURE — 99215 OFFICE O/P EST HI 40 MIN: CPT | Mod: S$PBB,25,, | Performed by: INTERNAL MEDICINE

## 2025-07-15 PROCEDURE — 3078F DIAST BP <80 MM HG: CPT | Mod: CPTII,,, | Performed by: INTERNAL MEDICINE

## 2025-07-15 PROCEDURE — 99497 ADVNCD CARE PLAN 30 MIN: CPT | Mod: S$PBB,25,, | Performed by: INTERNAL MEDICINE

## 2025-07-15 PROCEDURE — 3008F BODY MASS INDEX DOCD: CPT | Mod: CPTII,,, | Performed by: INTERNAL MEDICINE

## 2025-07-15 PROCEDURE — 3074F SYST BP LT 130 MM HG: CPT | Mod: CPTII,,, | Performed by: INTERNAL MEDICINE

## 2025-07-15 NOTE — PROGRESS NOTES
MEDICAL ONCOLOGY FOLLOW UP CONSULTATION NOTE    Patient ID: Marvin Pool is a 49 y.o. male.    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  He also completed chemo XRT and now is currently on adjuvant Temodar.  He would like to establish care here locally in [place] and voices no acute complaints reporting that he is tolerated Temodar without any side effects at this time.  He presents to our clinic today for further evaluation        Imaging:       MRI brain:  01/12/23    Expected immediate postsurgical changes of left frontal craniotomy for frontal mass resection.     Persistent area of T2 FLAIR hyperintense signal surrounding the resection is similar size compared to preoperative MRI. Typical thin peripheral enhancement around the resection cavity.        Past Medical History:   Diagnosis Date    Brain tumor 1/12/2023    Seizures 1/12/2023     Family History   Problem Relation Name Age of Onset    No Known Problems Mother      No Known Problems Father      No Known Problems Sister      No Known Problems Brother      No Known Problems Maternal Aunt       Social History     Socioeconomic History    Marital status:    Tobacco Use    Smoking status: Never    Smokeless tobacco: Never   Substance and Sexual Activity    Alcohol use: Not Currently     Social Drivers of Health     Financial Resource Strain: Low Risk  (11/16/2023)    Overall Financial Resource Strain (CARDIA)     Difficulty of Paying Living Expenses: Not very hard   Food Insecurity: No Food Insecurity (11/16/2023)    Hunger Vital Sign     Worried About Running Out of Food in the Last Year: Never true     Ran Out of Food in the Last Year: Never true   Transportation Needs: No Transportation Needs (11/16/2023)    PRAPARE - Transportation     Lack of Transportation (Medical):  No     Lack of Transportation (Non-Medical): No   Physical Activity: Sufficiently Active (11/16/2023)    Exercise Vital Sign     Days of Exercise per Week: 6 days     Minutes of Exercise per Session: 120 min   Stress: Stress Concern Present (11/16/2023)    [employer] - Occupational Stress Questionnaire     Feeling of Stress : Rather much   Housing Stability: High Risk (11/16/2023)    Housing Stability Vital Sign     Unable to Pay for Housing in the Last Year: Yes     Number of Places Lived in the Last Year: 1     Unstable Housing in the Last Year: No     Past Surgical History:   Procedure Laterality Date    CRANIOTOMY USING FRAMELESS STEREOTAXY Left 1/12/2023    Procedure: CRANIOTOMY, USING FRAMELESS STEREOTAXY;  Surgeon: Wellington Park MD;  Location: [facility] OR [address] [place] [address];  Service: Neurosurgery;  Laterality: Left;         Review of systems:  Review of Systems   Constitutional:  Positive for activity change. Negative for appetite change, chills, diaphoresis, fatigue and unexpected weight change.   HENT:  Negative for congestion, facial swelling, hearing loss, mouth sores, trouble swallowing and voice change.    Eyes:  Negative for photophobia, pain, discharge and itching.   Respiratory:  Negative for apnea, cough, choking, chest tightness and shortness of breath.    Cardiovascular:  Negative for chest pain, palpitations and leg swelling.   Gastrointestinal:  Negative for abdominal distention, abdominal pain, anal bleeding and blood in stool.   Endocrine: Negative for cold intolerance, heat intolerance, polydipsia and polyphagia.   Genitourinary:  Negative for difficulty urinating, dysuria, flank pain and hematuria.   Musculoskeletal:  Negative for arthralgias, back pain, joint swelling, myalgias, neck pain and neck stiffness.   Skin:  Negative for color change, pallor and wound.   Allergic/Immunologic: Negative for environmental allergies, food allergies and immunocompromised state.    Neurological:  Negative for dizziness, seizures, facial asymmetry, speech difficulty, light-headedness, numbness and headaches.   Hematological:  Negative for adenopathy. Does not bruise/bleed easily.   Psychiatric/Behavioral:  Positive for decreased concentration. Negative for agitation, behavioral problems and sleep disturbance.            Physical Exam  Vitals and nursing note reviewed.   Constitutional:       General: He is not in acute distress.     Appearance: Normal appearance. He is not ill-appearing.   HENT:      Head: Normocephalic and atraumatic.      Nose: No congestion or rhinorrhea.   Eyes:      General: No scleral icterus.     Extraocular Movements: Extraocular movements intact.      Pupils: Pupils are equal, round, and reactive to light.   Cardiovascular:      Rate and Rhythm: Normal rate and regular rhythm.      Pulses: Normal pulses.      Heart sounds: Normal heart sounds. No murmur heard.     No gallop.   Pulmonary:      Effort: Pulmonary effort is normal. No respiratory distress.      Breath sounds: Normal breath sounds. No stridor. No wheezing or rhonchi.   Abdominal:      General: Bowel sounds are normal. There is no distension.      Palpations: There is no mass.      Tenderness: There is no abdominal tenderness. There is no guarding.   Musculoskeletal:         General: No swelling, tenderness, deformity or signs of injury. Normal range of motion.      Cervical back: Normal range of motion and neck supple. No rigidity. No muscular tenderness.      Right lower leg: No edema.      Left lower leg: No edema.   Skin:     General: Skin is warm.      Coloration: Skin is not jaundiced or pale.      Findings: No bruising or lesion.   Neurological:      General: No focal deficit present.      Mental Status: He is alert and oriented to person, place, and time.      Cranial Nerves: No cranial nerve deficit.      Sensory: No sensory deficit.      Motor: No weakness.      Gait: Gait normal.   Psychiatric:          Mood and Affect: Mood normal.         Behavior: Behavior normal.         Thought Content: Thought content normal.                 Vitals:    07/15/25 1520   BP: 129/72   Pulse: (!) 57   Resp: 16           Body surface area is 1.9 meters squared.    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   == s/p Chemo-XRT completed recently and now on adjuvant Temodar tolerating well. I will add Bactrim DS for PCP prohylaxis at this time and see patient prior to C#2  == 7/10/23: Labs reviewed. Tolerating Bactrim DS well with no dose limiting toxicities. Voices no complaints. Next MRI brain in Aug 2023  ==8/07/23: First cycle of adjuvant Temozolomide well tolerated, patient has some mild nausea relieved with phenergan prn. He is not taking ondansetron states phenergan works better.  Temozolomide dosage is 390mg days 1-5 q 28 days.  Reports good compliance with bactrim for pcp prophylaxis. MRI Brain reviewed Next MRI brain is scheduled November 6th 2023.  No headaches, visual changes. Good compliance with Keppra, will check keppra level with labs.  ==09/05/23: Patient is s/p 2 cycles of adjuvant Temozolomide, has intermittent nausea relieved with phenergan.  No s/s infection, is on bactrim for pcp prophylaxis.  Plan to continue adjuvant Temozolomide 6-12 cycles with MRI brain on 11/6/23 has set up per radiation oncology. Keppra level not drawn, will add to orders  ==10/09/23: Patient doing well, recently saw neurosurgery with MRI brain which was noted to have continued stable residual disease.  Temozolomide well tolerated, reports occasional nausea relieved with ondansetron.  Will continue with Temozolomide, patient is cleared to start cycle 4 with 6-12 planned adjuvant cycles. Next MRI brain scheduled by neurosurgery in 4 months  ==11/6/23: Patient due to start cycle 5 adjuvant  Temozolomide.  Tolerating well.  ==12/20/23: MRI brain 12/4/23 shows stable disease with post radiation changes. Stable T2 hyperintense signal at the operative cavity which may be a combination of tumor and or gliosis.  ==01/17/24: Pt due for cycle 7 adjuvant Temozolomide.  He reports some headaches, mild expressive aphasia and stuttering intermittent for about last month.  He has MRI scheduled on 2/26/2024 scheduled by Dr. Park, patient's neurosurgeon.  I sent a message to Dr. Park regarding MRI to see if he would like to move it up.   ==01/18/24 Addendum: message received from Dr. Park, neurosurgeon.  He will have his office move up patient's MRI to be completed in [facility].  His office will contact patient  ==03/21/24: Pt is s/p cycle 8 adjuvant temozolomide. He recently had MRI and appt with Dr. Park, neurosurgeon which showed no evidence of recurrence or progression.  He was also having some difficulty voiding and urinary retention, saw Dr. Garay who performed a cystoscope which was normal. He has been unable to get Temodar for the last 2-3 weeks citing insurance and logistics issue. I will have my staff help him out in this regard.  == 5/27/24: Continuing with Temodar. Voices no acute issues. Last MRI brain April 2024 shows completely stable postop changes and no post radiation change with no evidence of tumor progression. Will continue with 12 cycles.  ==09/17/24: Tolerating temodar well. Last MRI brain for restaging done by Dr Park showed some response to Temodar. Plan to continue with Temodar for now until next restaging scan. Restaging scans being ordered by Dr Park.  == 10/30/24:  Patient continues to do well.  MRI restaging scan with encouraging results.  Plan to complete total 12 cycles of Temodar.  Patient will currently start his last cycle this coming week.  == 2/25/25: S/p completion of adjuvant Temodar doing well.  Restaging MRI brain showed no evidence of recurrence. Would recommend to continue  imaging surveillance and repeat MRI brain in 2 months. Post operative changes/edema noted and will continue to monitor closely.   == 7/15/25:  Recent ER visit where wife found him on the floor and prior to this he had episodes where he was confused during conversations.  Also recently started following more and had gait changes.  In the ER CT scan head was done but was negative.  After the patient has been home from the ER his frequency of falls is increasing.  He is also unable to do complicated tasks and unable to do mathematical additions and stares during episodes per wife.  Today he is unable to subtract properly and unable to spell complicated words.  I will order a stat MRI brain.  Also discussed with wife that he needs to start baby aspirin at this time    2. Anemia  == Mild. Will continue to monitor.     3. Nasal congestion  ==Resolved    Plan:  Stat MRI brain with contrast due to new onset confusion and falling episodes  Start baby aspirin  Go to ER in case of worsening symptoms.  Patient and wife voiced understanding      Advance Care Planning     Date: 07/16/2025    Power of   I initiated the process of voluntary advance care planning today and explained the importance of this process to the patient.  I introduced the concept of advance directives to the patient, as well. Then the patient received detailed information about the importance of designating a Health Care Power of  (HCPOA). He was also instructed to communicate with this person about their wishes for future healthcare, should he become sick and lose decision-making capacity. The patient has not previously appointed a HCPOA. After our discussion, the patient has decided to complete a HCPOA and has appointed his , health care agent. Please see scanned on chart. I encouraged him to communicate with this person about their wishes for future healthcare, should he become sick and lose decision-making capacity.      A total of 20 min  was spent on advance care planning, goals of care discussion, emotional support, formulating and communicating prognosis and exploring burden/benefit of various approaches of treatment. This discussion occurred on a fully voluntary basis with the verbal consent of the patient and/or family.        Total time spent in counseling and discussion about further management options including relevant lab work, treatment,  prognosis, medications and intended side effects was more than 60 minutes. More than 50% of the time was spent on counseling and coordination of care.  I spent a total of 60 minutes on the day of the visit.This includes face to face time and non-face to face time preparing to see the patient (eg, review of tests), Obtaining and/or reviewing separately obtained history, Documenting clinical information in the electronic or other health record, Independently interpreting resultsand communicating results to the patient/family/caregiver, or Care coordination.

## 2025-07-16 DIAGNOSIS — C71.1 MALIGNANT NEOPLASM OF FRONTAL LOBE: ICD-10-CM

## 2025-07-16 DIAGNOSIS — C71.9 GLIOMA: Primary | ICD-10-CM

## 2025-07-22 ENCOUNTER — OFFICE VISIT (OUTPATIENT)
Dept: HEMATOLOGY/ONCOLOGY | Facility: CLINIC | Age: 50
End: 2025-07-22
Payer: COMMERCIAL

## 2025-07-22 VITALS
HEIGHT: 70 IN | DIASTOLIC BLOOD PRESSURE: 77 MMHG | RESPIRATION RATE: 16 BRPM | HEART RATE: 57 BPM | OXYGEN SATURATION: 97 % | WEIGHT: 167 LBS | SYSTOLIC BLOOD PRESSURE: 132 MMHG | BODY MASS INDEX: 23.91 KG/M2

## 2025-07-22 DIAGNOSIS — C71.9 GLIOMA: Primary | ICD-10-CM

## 2025-07-22 DIAGNOSIS — K21.9 GASTROESOPHAGEAL REFLUX DISEASE, UNSPECIFIED WHETHER ESOPHAGITIS PRESENT: ICD-10-CM

## 2025-07-22 RX ORDER — PANTOPRAZOLE SODIUM 40 MG/1
40 TABLET, DELAYED RELEASE ORAL DAILY
Qty: 30 TABLET | Refills: 11 | Status: SHIPPED | OUTPATIENT
Start: 2025-07-22 | End: 2026-07-22

## 2025-07-22 RX ORDER — DEXAMETHASONE 2 MG/1
2 TABLET ORAL EVERY 12 HOURS
Qty: 28 TABLET | Refills: 0 | Status: SHIPPED | OUTPATIENT
Start: 2025-07-22 | End: 2025-08-05

## 2025-07-22 NOTE — PROGRESS NOTES
MEDICAL ONCOLOGY FOLLOW UP CONSULTATION NOTE    Patient ID: Marvin Pool is a 49 y.o. male.    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  He also completed chemo XRT and now is currently on adjuvant Temodar.  He would like to establish care here locally in [place] and voices no acute complaints reporting that he is tolerated Temodar without any side effects at this time.  He presents to our clinic today for further evaluation        Imaging:       MRI brain:  01/12/23    Expected immediate postsurgical changes of left frontal craniotomy for frontal mass resection.     Persistent area of T2 FLAIR hyperintense signal surrounding the resection is similar size compared to preoperative MRI. Typical thin peripheral enhancement around the resection cavity.        Past Medical History:   Diagnosis Date    Brain tumor 1/12/2023    Seizures 1/12/2023     Family History   Problem Relation Name Age of Onset    No Known Problems Mother      No Known Problems Father      No Known Problems Sister      No Known Problems Brother      No Known Problems Maternal Aunt       Social History     Socioeconomic History    Marital status:    Tobacco Use    Smoking status: Never    Smokeless tobacco: Never   Substance and Sexual Activity    Alcohol use: Not Currently     Social Drivers of Health     Financial Resource Strain: Low Risk  (11/16/2023)    Overall Financial Resource Strain (CARDIA)     Difficulty of Paying Living Expenses: Not very hard   Food Insecurity: No Food Insecurity (11/16/2023)    Hunger Vital Sign     Worried About Running Out of Food in the Last Year: Never true     Ran Out of Food in the Last Year: Never true   Transportation Needs: No Transportation Needs (11/16/2023)    PRAPARE - Transportation     Lack of Transportation (Medical):  Health Maintenance Due   Topic Date Due   • DTaP/Tdap/Td Vaccine (1 - Tdap) 03/16/2007   • Influenza Vaccine (1) 09/01/2020   • Depression Screening  03/04/2021       Patient is due for topics as listed above but is not proceeding with Immunization(s) Dtap/Tdap/Td and Influenza at this time. Education provided for covid.                   No     Lack of Transportation (Non-Medical): No   Physical Activity: Sufficiently Active (11/16/2023)    Exercise Vital Sign     Days of Exercise per Week: 6 days     Minutes of Exercise per Session: 120 min   Stress: Stress Concern Present (11/16/2023)    [employer] - Occupational Stress Questionnaire     Feeling of Stress : Rather much   Housing Stability: High Risk (11/16/2023)    Housing Stability Vital Sign     Unable to Pay for Housing in the Last Year: Yes     Number of Places Lived in the Last Year: 1     Unstable Housing in the Last Year: No     Past Surgical History:   Procedure Laterality Date    CRANIOTOMY USING FRAMELESS STEREOTAXY Left 1/12/2023    Procedure: CRANIOTOMY, USING FRAMELESS STEREOTAXY;  Surgeon: Wellington Park MD;  Location: [facility] OR [address] [place] [address];  Service: Neurosurgery;  Laterality: Left;         Review of systems:  Review of Systems   Constitutional:  Positive for activity change. Negative for appetite change, chills, diaphoresis, fatigue and unexpected weight change.   HENT:  Negative for congestion, facial swelling, hearing loss, mouth sores, trouble swallowing and voice change.    Eyes:  Negative for photophobia, pain, discharge and itching.   Respiratory:  Negative for apnea, cough, choking, chest tightness and shortness of breath.    Cardiovascular:  Negative for chest pain, palpitations and leg swelling.   Gastrointestinal:  Negative for abdominal distention, abdominal pain, anal bleeding and blood in stool.   Endocrine: Negative for cold intolerance, heat intolerance, polydipsia and polyphagia.   Genitourinary:  Negative for difficulty urinating, dysuria, flank pain and hematuria.   Musculoskeletal:  Negative for arthralgias, back pain, joint swelling, myalgias, neck pain and neck stiffness.   Skin:  Negative for color change, pallor and wound.   Allergic/Immunologic: Negative for environmental allergies, food allergies and immunocompromised state.    Neurological:  Negative for dizziness, seizures, facial asymmetry, speech difficulty, light-headedness, numbness and headaches.   Hematological:  Negative for adenopathy. Does not bruise/bleed easily.   Psychiatric/Behavioral:  Positive for decreased concentration. Negative for agitation, behavioral problems and sleep disturbance.            Physical Exam  Vitals and nursing note reviewed.   Constitutional:       General: He is not in acute distress.     Appearance: Normal appearance. He is not ill-appearing.   HENT:      Head: Normocephalic and atraumatic.      Nose: No congestion or rhinorrhea.   Eyes:      General: No scleral icterus.     Extraocular Movements: Extraocular movements intact.      Pupils: Pupils are equal, round, and reactive to light.   Cardiovascular:      Rate and Rhythm: Normal rate and regular rhythm.      Pulses: Normal pulses.      Heart sounds: Normal heart sounds. No murmur heard.     No gallop.   Pulmonary:      Effort: Pulmonary effort is normal. No respiratory distress.      Breath sounds: Normal breath sounds. No stridor. No wheezing or rhonchi.   Abdominal:      General: Bowel sounds are normal. There is no distension.      Palpations: There is no mass.      Tenderness: There is no abdominal tenderness. There is no guarding.   Musculoskeletal:         General: No swelling, tenderness, deformity or signs of injury. Normal range of motion.      Cervical back: Normal range of motion and neck supple. No rigidity. No muscular tenderness.      Right lower leg: No edema.      Left lower leg: No edema.   Skin:     General: Skin is warm.      Coloration: Skin is not jaundiced or pale.      Findings: No bruising or lesion.   Neurological:      General: No focal deficit present.      Mental Status: He is alert and oriented to person, place, and time.      Cranial Nerves: No cranial nerve deficit.      Sensory: No sensory deficit.      Motor: No weakness.      Gait: Gait normal.   Psychiatric:          Mood and Affect: Mood normal.         Behavior: Behavior normal.         Thought Content: Thought content normal.                 Vitals:    07/22/25 1545   BP: 132/77   Pulse: (!) 57   Resp: 16           Body surface area is 1.93 meters squared.    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   == s/p Chemo-XRT completed recently and now on adjuvant Temodar tolerating well. I will add Bactrim DS for PCP prohylaxis at this time and see patient prior to C#2  == 7/10/23: Labs reviewed. Tolerating Bactrim DS well with no dose limiting toxicities. Voices no complaints. Next MRI brain in Aug 2023  ==8/07/23: First cycle of adjuvant Temozolomide well tolerated, patient has some mild nausea relieved with phenergan prn. He is not taking ondansetron states phenergan works better.  Temozolomide dosage is 390mg days 1-5 q 28 days.  Reports good compliance with bactrim for pcp prophylaxis. MRI Brain reviewed Next MRI brain is scheduled November 6th 2023.  No headaches, visual changes. Good compliance with Keppra, will check keppra level with labs.  ==09/05/23: Patient is s/p 2 cycles of adjuvant Temozolomide, has intermittent nausea relieved with phenergan.  No s/s infection, is on bactrim for pcp prophylaxis.  Plan to continue adjuvant Temozolomide 6-12 cycles with MRI brain on 11/6/23 has set up per radiation oncology. Keppra level not drawn, will add to orders  ==10/09/23: Patient doing well, recently saw neurosurgery with MRI brain which was noted to have continued stable residual disease.  Temozolomide well tolerated, reports occasional nausea relieved with ondansetron.  Will continue with Temozolomide, patient is cleared to start cycle 4 with 6-12 planned adjuvant cycles. Next MRI brain scheduled by neurosurgery in 4 months  ==11/6/23: Patient due to start cycle 5 adjuvant  Temozolomide.  Tolerating well.  ==12/20/23: MRI brain 12/4/23 shows stable disease with post radiation changes. Stable T2 hyperintense signal at the operative cavity which may be a combination of tumor and or gliosis.  ==01/17/24: Pt due for cycle 7 adjuvant Temozolomide.  He reports some headaches, mild expressive aphasia and stuttering intermittent for about last month.  He has MRI scheduled on 2/26/2024 scheduled by Dr. Park, patient's neurosurgeon.  I sent a message to Dr. Park regarding MRI to see if he would like to move it up.   ==01/18/24 Addendum: message received from Dr. Park, neurosurgeon.  He will have his office move up patient's MRI to be completed in [facility].  His office will contact patient  ==03/21/24: Pt is s/p cycle 8 adjuvant temozolomide. He recently had MRI and appt with Dr. Park, neurosurgeon which showed no evidence of recurrence or progression.  He was also having some difficulty voiding and urinary retention, saw Dr. Garay who performed a cystoscope which was normal. He has been unable to get Temodar for the last 2-3 weeks citing insurance and logistics issue. I will have my staff help him out in this regard.  == 5/27/24: Continuing with Temodar. Voices no acute issues. Last MRI brain April 2024 shows completely stable postop changes and no post radiation change with no evidence of tumor progression. Will continue with 12 cycles.  ==09/17/24: Tolerating temodar well. Last MRI brain for restaging done by Dr Park showed some response to Temodar. Plan to continue with Temodar for now until next restaging scan. Restaging scans being ordered by Dr Park.  == 10/30/24:  Patient continues to do well.  MRI restaging scan with encouraging results.  Plan to complete total 12 cycles of Temodar.  Patient will currently start his last cycle this coming week.  == 2/25/25: S/p completion of adjuvant Temodar doing well.  Restaging MRI brain showed no evidence of recurrence. Would recommend to continue  imaging surveillance and repeat MRI brain in 2 months. Post operative changes/edema noted and will continue to monitor closely.   == 7/15/25:  Recent ER visit where wife found him on the floor and prior to this he had episodes where he was confused during conversations.  Also recently started following more and had gait changes.  In the ER CT scan head was done but was negative.  After the patient has been home from the ER his frequency of falls is increasing.  He is also unable to do complicated tasks and unable to do mathematical additions and stares during episodes per wife.  Today he is unable to subtract properly and unable to spell complicated words.  I will order a stat MRI brain.  Also discussed with wife that he needs to start baby aspirin at this time.  == 7/22/25:  MRI brain showed no evidence of malignancy or recurrence of glioma.  Patient reports having trauma to the head and I discussed with him MRI brain findings which depicts swelling around the frontal region.  Also noted was old blood products present with some gliosis along with frontal lobe edema.  I will place the patient on a trial of dexamethasone and discussed these findings with his wife on phone as well.  Will continue close monitoring with serial MRIs in the future to see resolution of hematoma and swelling    2. Anemia  == Mild. Will continue to monitor.     3. Nasal congestion  ==Resolved    Plan:  Start Decadron low-dose and continue monitoring with imaging surveillance    Return to clinic in 2 weeks for MD visit with CBC with diff CMP prior      Future Appointments   Date Time Provider Department Center   8/1/2025 11:30 AM LAB TESTING, [facility]   8/5/2025  3:20 PM John Graves MD [facility]         Advance Care Planning     Date: 07/16/2025    Power of   I initiated the process of voluntary advance care planning today and explained the importance of this process to the patient.  I  introduced the concept of advance directives to the patient, as well. Then the patient received detailed information about the importance of designating a Health Care Power of  (HCPOA). He was also instructed to communicate with this person about their wishes for future healthcare, should he become sick and lose decision-making capacity. The patient has not previously appointed a HCPOA. After our discussion, the patient has decided to complete a HCPOA and has appointed his , health care agent. Please see scanned on chart. I encouraged him to communicate with this person about their wishes for future healthcare, should he become sick and lose decision-making capacity.      A total of 20 min was spent on advance care planning, goals of care discussion, emotional support, formulating and communicating prognosis and exploring burden/benefit of various approaches of treatment. This discussion occurred on a fully voluntary basis with the verbal consent of the patient and/or family.        Total time spent in counseling and discussion about further management options including relevant lab work, treatment,  prognosis, medications and intended side effects was more than 40 minutes. More than 50% of the time was spent on counseling and coordination of care.  I spent a total of 40 minutes on the day of the visit.This includes face to face time and non-face to face time preparing to see the patient (eg, review of tests), Obtaining and/or reviewing separately obtained history, Documenting clinical information in the electronic or other health record, Independently interpreting resultsand communicating results to the patient/family/caregiver, or Care coordination.

## 2025-08-05 ENCOUNTER — OFFICE VISIT (OUTPATIENT)
Dept: HEMATOLOGY/ONCOLOGY | Facility: CLINIC | Age: 50
End: 2025-08-05
Payer: COMMERCIAL

## 2025-08-05 VITALS
SYSTOLIC BLOOD PRESSURE: 147 MMHG | HEART RATE: 57 BPM | HEIGHT: 70 IN | RESPIRATION RATE: 16 BRPM | WEIGHT: 160.13 LBS | DIASTOLIC BLOOD PRESSURE: 82 MMHG | OXYGEN SATURATION: 97 % | BODY MASS INDEX: 22.92 KG/M2

## 2025-08-05 DIAGNOSIS — C71.9 GLIOMA: Primary | ICD-10-CM

## 2025-08-05 PROCEDURE — 3079F DIAST BP 80-89 MM HG: CPT | Mod: CPTII,,, | Performed by: INTERNAL MEDICINE

## 2025-08-05 PROCEDURE — 1160F RVW MEDS BY RX/DR IN RCRD: CPT | Mod: CPTII,,, | Performed by: INTERNAL MEDICINE

## 2025-08-05 PROCEDURE — 3008F BODY MASS INDEX DOCD: CPT | Mod: CPTII,,, | Performed by: INTERNAL MEDICINE

## 2025-08-05 PROCEDURE — 3077F SYST BP >= 140 MM HG: CPT | Mod: CPTII,,, | Performed by: INTERNAL MEDICINE

## 2025-08-05 PROCEDURE — 1159F MED LIST DOCD IN RCRD: CPT | Mod: CPTII,,, | Performed by: INTERNAL MEDICINE

## 2025-08-05 PROCEDURE — 99215 OFFICE O/P EST HI 40 MIN: CPT | Mod: S$PBB,,, | Performed by: INTERNAL MEDICINE

## 2025-08-05 NOTE — PROGRESS NOTES
MEDICAL ONCOLOGY FOLLOW UP CONSULTATION NOTE    Patient ID: Marvin Pool is a 49 y.o. male.    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  He also completed chemo XRT and now is currently on adjuvant Temodar.  He would like to establish care here locally in [place] and voices no acute complaints reporting that he is tolerated Temodar without any side effects at this time.  He presents to our clinic today for further evaluation        Imaging:       MRI brain:  01/12/23    Expected immediate postsurgical changes of left frontal craniotomy for frontal mass resection.     Persistent area of T2 FLAIR hyperintense signal surrounding the resection is similar size compared to preoperative MRI. Typical thin peripheral enhancement around the resection cavity.        Past Medical History:   Diagnosis Date    Brain tumor 1/12/2023    Seizures 1/12/2023     Family History   Problem Relation Name Age of Onset    No Known Problems Mother      No Known Problems Father      No Known Problems Sister      No Known Problems Brother      No Known Problems Maternal Aunt       Social History     Socioeconomic History    Marital status:    Tobacco Use    Smoking status: Never    Smokeless tobacco: Never   Substance and Sexual Activity    Alcohol use: Not Currently     Social Drivers of Health     Financial Resource Strain: Low Risk  (11/16/2023)    Overall Financial Resource Strain (CARDIA)     Difficulty of Paying Living Expenses: Not very hard   Food Insecurity: No Food Insecurity (11/16/2023)    Hunger Vital Sign     Worried About Running Out of Food in the Last Year: Never true     Ran Out of Food in the Last Year: Never true   Transportation Needs: No Transportation Needs (11/16/2023)    PRAPARE - Transportation     Lack of Transportation (Medical):  No     Lack of Transportation (Non-Medical): No   Physical Activity: Sufficiently Active (11/16/2023)    Exercise Vital Sign     Days of Exercise per Week: 6 days     Minutes of Exercise per Session: 120 min   Stress: Stress Concern Present (11/16/2023)    [employer] - Occupational Stress Questionnaire     Feeling of Stress : Rather much   Housing Stability: High Risk (11/16/2023)    Housing Stability Vital Sign     Unable to Pay for Housing in the Last Year: Yes     Number of Places Lived in the Last Year: 1     Unstable Housing in the Last Year: No     Past Surgical History:   Procedure Laterality Date    CRANIOTOMY USING FRAMELESS STEREOTAXY Left 1/12/2023    Procedure: CRANIOTOMY, USING FRAMELESS STEREOTAXY;  Surgeon: Wellington Park MD;  Location: [facility] OR [address] [place] [address];  Service: Neurosurgery;  Laterality: Left;         Review of systems:  Review of Systems   Constitutional:  Positive for activity change. Negative for appetite change, chills, diaphoresis, fatigue and unexpected weight change.   HENT:  Negative for congestion, facial swelling, hearing loss, mouth sores, trouble swallowing and voice change.    Eyes:  Negative for photophobia, pain, discharge and itching.   Respiratory:  Negative for apnea, cough, choking, chest tightness and shortness of breath.    Cardiovascular:  Negative for chest pain, palpitations and leg swelling.   Gastrointestinal:  Negative for abdominal distention, abdominal pain, anal bleeding and blood in stool.   Endocrine: Negative for cold intolerance, heat intolerance, polydipsia and polyphagia.   Genitourinary:  Negative for difficulty urinating, dysuria, flank pain and hematuria.   Musculoskeletal:  Negative for arthralgias, back pain, joint swelling, myalgias, neck pain and neck stiffness.   Skin:  Negative for color change, pallor and wound.   Allergic/Immunologic: Negative for environmental allergies, food allergies and immunocompromised state.    Neurological:  Negative for dizziness, seizures, facial asymmetry, speech difficulty, light-headedness, numbness and headaches.   Hematological:  Negative for adenopathy. Does not bruise/bleed easily.   Psychiatric/Behavioral:  Positive for decreased concentration. Negative for agitation, behavioral problems and sleep disturbance.            Physical Exam  Vitals and nursing note reviewed.   Constitutional:       General: He is not in acute distress.     Appearance: Normal appearance. He is not ill-appearing.   HENT:      Head: Normocephalic and atraumatic.      Nose: No congestion or rhinorrhea.   Eyes:      General: No scleral icterus.     Extraocular Movements: Extraocular movements intact.      Pupils: Pupils are equal, round, and reactive to light.   Cardiovascular:      Rate and Rhythm: Normal rate and regular rhythm.      Pulses: Normal pulses.      Heart sounds: Normal heart sounds. No murmur heard.     No gallop.   Pulmonary:      Effort: Pulmonary effort is normal. No respiratory distress.      Breath sounds: Normal breath sounds. No stridor. No wheezing or rhonchi.   Abdominal:      General: Bowel sounds are normal. There is no distension.      Palpations: There is no mass.      Tenderness: There is no abdominal tenderness. There is no guarding.   Musculoskeletal:         General: No swelling, tenderness, deformity or signs of injury. Normal range of motion.      Cervical back: Normal range of motion and neck supple. No rigidity. No muscular tenderness.      Right lower leg: No edema.      Left lower leg: No edema.   Skin:     General: Skin is warm.      Coloration: Skin is not jaundiced or pale.      Findings: No bruising or lesion.   Neurological:      General: No focal deficit present.      Mental Status: He is alert and oriented to person, place, and time.      Cranial Nerves: No cranial nerve deficit.      Sensory: No sensory deficit.      Motor: No weakness.      Gait: Gait normal.   Psychiatric:          Mood and Affect: Mood normal.         Behavior: Behavior normal.         Thought Content: Thought content normal.                 Vitals:    08/05/25 1506   BP: (!) 147/82   Pulse: (!) 57   Resp: 16           Body surface area is 1.89 meters squared.    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   == s/p Chemo-XRT completed recently and now on adjuvant Temodar tolerating well. I will add Bactrim DS for PCP prohylaxis at this time and see patient prior to C#2  == 7/10/23: Labs reviewed. Tolerating Bactrim DS well with no dose limiting toxicities. Voices no complaints. Next MRI brain in Aug 2023  ==8/07/23: First cycle of adjuvant Temozolomide well tolerated, patient has some mild nausea relieved with phenergan prn. He is not taking ondansetron states phenergan works better.  Temozolomide dosage is 390mg days 1-5 q 28 days.  Reports good compliance with bactrim for pcp prophylaxis. MRI Brain reviewed Next MRI brain is scheduled November 6th 2023.  No headaches, visual changes. Good compliance with Keppra, will check keppra level with labs.  ==09/05/23: Patient is s/p 2 cycles of adjuvant Temozolomide, has intermittent nausea relieved with phenergan.  No s/s infection, is on bactrim for pcp prophylaxis.  Plan to continue adjuvant Temozolomide 6-12 cycles with MRI brain on 11/6/23 has set up per radiation oncology. Keppra level not drawn, will add to orders  ==10/09/23: Patient doing well, recently saw neurosurgery with MRI brain which was noted to have continued stable residual disease.  Temozolomide well tolerated, reports occasional nausea relieved with ondansetron.  Will continue with Temozolomide, patient is cleared to start cycle 4 with 6-12 planned adjuvant cycles. Next MRI brain scheduled by neurosurgery in 4 months  ==11/6/23: Patient due to start cycle 5  adjuvant Temozolomide.  Tolerating well.  ==12/20/23: MRI brain 12/4/23 shows stable disease with post radiation changes. Stable T2 hyperintense signal at the operative cavity which may be a combination of tumor and or gliosis.  ==01/17/24: Pt due for cycle 7 adjuvant Temozolomide.  He reports some headaches, mild expressive aphasia and stuttering intermittent for about last month.  He has MRI scheduled on 2/26/2024 scheduled by Dr. Park, patient's neurosurgeon.  I sent a message to Dr. Park regarding MRI to see if he would like to move it up.   ==01/18/24 Addendum: message received from Dr. Park, neurosurgeon.  He will have his office move up patient's MRI to be completed in [facility].  His office will contact patient  ==03/21/24: Pt is s/p cycle 8 adjuvant temozolomide. He recently had MRI and appt with Dr. Park, neurosurgeon which showed no evidence of recurrence or progression.  He was also having some difficulty voiding and urinary retention, saw Dr. Garay who performed a cystoscope which was normal. He has been unable to get Temodar for the last 2-3 weeks citing insurance and logistics issue. I will have my staff help him out in this regard.  == 5/27/24: Continuing with Temodar. Voices no acute issues. Last MRI brain April 2024 shows completely stable postop changes and no post radiation change with no evidence of tumor progression. Will continue with 12 cycles.  ==09/17/24: Tolerating temodar well. Last MRI brain for restaging done by Dr Park showed some response to Temodar. Plan to continue with Temodar for now until next restaging scan. Restaging scans being ordered by Dr Park.  == 10/30/24:  Patient continues to do well.  MRI restaging scan with encouraging results.  Plan to complete total 12 cycles of Temodar.  Patient will currently start his last cycle this coming week.  == 2/25/25: S/p completion of adjuvant Temodar doing well.  Restaging MRI brain showed no evidence of recurrence. Would recommend to  continue imaging surveillance and repeat MRI brain in 2 months. Post operative changes/edema noted and will continue to monitor closely.   == 7/15/25:  Recent ER visit where wife found him on the floor and prior to this he had episodes where he was confused during conversations.  Also recently started following more and had gait changes.  In the ER CT scan head was done but was negative.  After the patient has been home from the ER his frequency of falls is increasing.  He is also unable to do complicated tasks and unable to do mathematical additions and stares during episodes per wife.  Today he is unable to subtract properly and unable to spell complicated words.  I will order a stat MRI brain.  Also discussed with wife that he needs to start baby aspirin at this time.  == 7/22/25:  MRI brain showed no evidence of malignancy or recurrence of glioma.  Patient reports having trauma to the head and I discussed with him MRI brain findings which depicts swelling around the frontal region.  Also noted was old blood products present with some gliosis along with frontal lobe edema.  I will place the patient on a trial of dexamethasone and discussed these findings with his wife on phone as well.  Will continue close monitoring with serial MRIs in the future to see resolution of hematoma and swelling.  == 8/5/25: Improvement in mental status since he started decadron per wife. Also evaluated recently by Rad-onc recently. He has been drinking a lot lately per wife and patient feels could be the reason of his recent fall which prompted imaging as above. He was placed on a low dose of decadron which will be tapered slowly to off in the next week. He will continue with seizure prophylaxis    2. Anemia  == Mild. Will continue to monitor.     3. Nasal congestion  ==Resolved    Plan:  Continue observation  Continue to follow up with Rad-onc  Continue [facility]    RTC in 4 months for MD visit with labs: CBC, CMP prior        Future  Appointments   Date Time Provider Department Center   11/4/2025  1:00 PM LAB TESTING[facility] [address]   11/5/2025  3:00 PM John Graves MD [facility] [address]         Total time spent in counseling and discussion about further management options including relevant lab work, treatment,  prognosis, medications and intended side effects was more than 40 minutes. More than 50% of the time was spent on counseling and coordination of care.  I spent a total of 40 minutes on the day of the visit.This includes face to face time and non-face to face time preparing to see the patient (eg, review of tests), Obtaining and/or reviewing separately obtained history, Documenting clinical information in the electronic or other health record, Independently interpreting resultsand communicating results to the patient/family/caregiver, or Care coordination.

## 2025-08-21 DIAGNOSIS — R33.9 INCOMPLETE BLADDER EMPTYING: ICD-10-CM

## 2025-08-21 RX ORDER — FINASTERIDE 5 MG/1
5 TABLET, FILM COATED ORAL
Qty: 90 TABLET | Refills: 0 | Status: SHIPPED | OUTPATIENT
Start: 2025-08-21

## (undated) DEVICE — SUT CTD VICRYL BR CR/SH VIL

## (undated) DEVICE — MARKER SKIN STND TIP BLUE BARR

## (undated) DEVICE — BURR ROUTER TAPERED

## (undated) DEVICE — SPRAY MASTISOL

## (undated) DEVICE — DURAPREP SURG SCRUB 26ML

## (undated) DEVICE — SEE MEDLINE ITEM 156905

## (undated) DEVICE — ELECTRODE REM PLYHSV RETURN 9

## (undated) DEVICE — CARTRIDGE OIL

## (undated) DEVICE — BURR ACORN 7.5MM

## (undated) DEVICE — Device

## (undated) DEVICE — DRESSING SURGICAL 1X1

## (undated) DEVICE — TRAY SKIN SCRUB WET PREMIUM

## (undated) DEVICE — COVER PROXIMA MAYO STAND

## (undated) DEVICE — SPONGE NEURO 1/4X1/4

## (undated) DEVICE — RUBBERBAND STERILE 3X1/8IN

## (undated) DEVICE — CORD BIPOLAR 12 FOOT

## (undated) DEVICE — BUR BONE CUT MICRO TPS 3X3.8MM

## (undated) DEVICE — DRAPE T THYROID STERILE

## (undated) DEVICE — DRESSING SURGICAL 1X3

## (undated) DEVICE — CONTAINER SPECIMEN STRL 4OZ

## (undated) DEVICE — DIFFUSER

## (undated) DEVICE — HOOK STAY ELAS 5MM 8EA/PK

## (undated) DEVICE — MARKERS SPHERZ PASSIVE

## (undated) DEVICE — ROUTER TAPERED 2.3MM

## (undated) DEVICE — DRAPE SURGICAL STERI IRRG PCH

## (undated) DEVICE — BLADE SURG CARBON STEEL SZ11

## (undated) DEVICE — SEALANT VISTASEAL FIBRIN 4ML

## (undated) DEVICE — TUBE FRAZIER 5MM 2FT SOFT TIP

## (undated) DEVICE — TAPE SURG MEDIPORE 6X72IN

## (undated) DEVICE — SUT 4/0 18IN NUROLON BLK B

## (undated) DEVICE — TOWEL OR DISP STRL BLUE 4/PK

## (undated) DEVICE — HEMOSTAT SURGICEL 4X8IN

## (undated) DEVICE — DRESSING SURGICAL 1/2X1/2

## (undated) DEVICE — DRESSING TELFA N ADH 3X8

## (undated) DEVICE — COTTON BALLS 1/2IN

## (undated) DEVICE — DRAPE OPMI STERILE